# Patient Record
Sex: MALE | Race: WHITE | NOT HISPANIC OR LATINO | Employment: OTHER | URBAN - METROPOLITAN AREA
[De-identification: names, ages, dates, MRNs, and addresses within clinical notes are randomized per-mention and may not be internally consistent; named-entity substitution may affect disease eponyms.]

---

## 2017-03-09 ENCOUNTER — OFFICE VISIT (OUTPATIENT)
Dept: INTERNAL MEDICINE | Facility: CLINIC | Age: 67
End: 2017-03-09

## 2017-03-09 VITALS
DIASTOLIC BLOOD PRESSURE: 78 MMHG | BODY MASS INDEX: 29.8 KG/M2 | HEIGHT: 72 IN | WEIGHT: 220 LBS | SYSTOLIC BLOOD PRESSURE: 128 MMHG

## 2017-03-09 DIAGNOSIS — I25.10 CORONARY ARTERIOSCLEROSIS IN NATIVE ARTERY: ICD-10-CM

## 2017-03-09 DIAGNOSIS — E78.00 PURE HYPERCHOLESTEROLEMIA: ICD-10-CM

## 2017-03-09 DIAGNOSIS — Z12.5 PROSTATE CANCER SCREENING: ICD-10-CM

## 2017-03-09 DIAGNOSIS — I10 ESSENTIAL HYPERTENSION, BENIGN: ICD-10-CM

## 2017-03-09 DIAGNOSIS — F39 EPISODIC MOOD DISORDER (HCC): ICD-10-CM

## 2017-03-09 DIAGNOSIS — K21.00 GASTROESOPHAGEAL REFLUX DISEASE WITH ESOPHAGITIS: ICD-10-CM

## 2017-03-09 DIAGNOSIS — H81.10 BENIGN POSITIONAL VERTIGO, UNSPECIFIED LATERALITY: ICD-10-CM

## 2017-03-09 DIAGNOSIS — Z00.00 PERIODIC HEALTH ASSESSMENT, GENERAL SCREENING, ADULT: Primary | ICD-10-CM

## 2017-03-09 DIAGNOSIS — H54.40 BLINDNESS, RIGHT EYE, NORMAL VISION LEFT EYE: ICD-10-CM

## 2017-03-09 LAB
ALBUMIN SERPL-MCNC: 3.76 G/DL (ref 3.4–4.6)
ALBUMIN/GLOB SERPL: 1.2 G/DL
ALP SERPL-CCNC: 91 U/L (ref 46–116)
ALT SERPL W P-5'-P-CCNC: 32 U/L (ref 16–63)
ANION GAP SERPL CALCULATED.3IONS-SCNC: 7 MMOL/L
AST SERPL-CCNC: 28 U/L (ref 7–37)
BACTERIA UR QL AUTO: ABNORMAL /HPF
BILIRUB SERPL-MCNC: 0.6 MG/DL (ref 0.2–1)
BILIRUB UR QL STRIP: NEGATIVE
BUN BLD-MCNC: 17 MG/DL (ref 6–22)
BUN/CREAT SERPL: 16.3 (ref 7–25)
CALCIUM SPEC-SCNC: 8.6 MG/DL (ref 8.6–10.5)
CHLORIDE SERPL-SCNC: 105 MMOL/L (ref 95–107)
CHOLEST SERPL-MCNC: 116 MG/DL (ref 0–200)
CLARITY UR: CLEAR
CO2 SERPL-SCNC: 29 MMOL/L (ref 23–32)
COLOR UR: YELLOW
CREAT BLD-MCNC: 1.04 MG/DL (ref 0.7–1.3)
ERYTHROCYTE [DISTWIDTH] IN BLOOD BY AUTOMATED COUNT: 13.8 % (ref 11.5–14.5)
GFR SERPL CREATININE-BSD FRML MDRD: 71 ML/MIN/1.73
GLOBULIN UR ELPH-MCNC: 3 GM/DL
GLUCOSE BLD-MCNC: 97 MG/DL (ref 70–100)
GLUCOSE UR STRIP-MCNC: NEGATIVE MG/DL
HCT VFR BLD AUTO: 42.9 % (ref 40.4–52.2)
HDLC SERPL-MCNC: 34 MG/DL (ref 40–81)
HGB BLD-MCNC: 14.5 G/DL (ref 13.7–17.6)
HGB UR QL STRIP.AUTO: ABNORMAL
HYALINE CASTS UR QL AUTO: ABNORMAL /LPF
KETONES UR QL STRIP: NEGATIVE
LDLC SERPL CALC-MCNC: 65 MG/DL (ref 0–100)
LDLC/HDLC SERPL: 1.9 {RATIO}
LEUKOCYTE ESTERASE UR QL STRIP.AUTO: NEGATIVE
MCH RBC QN AUTO: 29.2 PG (ref 27–32.7)
MCHC RBC AUTO-ENTMCNC: 33.8 G/DL (ref 32.6–36.4)
MCV RBC AUTO: 86.3 FL (ref 79.8–96.2)
NITRITE UR QL STRIP: NEGATIVE
PH UR STRIP.AUTO: 6 [PH] (ref 5–8)
PLATELET # BLD AUTO: 213 10*3/MM3 (ref 140–500)
POTASSIUM BLD-SCNC: 4.6 MMOL/L (ref 3.3–5.3)
PROT SERPL-MCNC: 6.8 G/DL (ref 6.3–8.4)
PROT UR QL STRIP: NEGATIVE
PSA SERPL-MCNC: 0.53 NG/ML (ref 0.13–4)
RBC # BLD AUTO: 4.97 10*6/MM3 (ref 4.6–6)
RBC # UR: ABNORMAL /HPF
REF LAB TEST METHOD: ABNORMAL
SODIUM BLD-SCNC: 141 MMOL/L (ref 136–145)
SP GR UR STRIP: 1.02 (ref 1–1.03)
SQUAMOUS #/AREA URNS HPF: ABNORMAL /HPF
TRIGL SERPL-MCNC: 87 MG/DL (ref 0–150)
UROBILINOGEN UR QL STRIP: ABNORMAL
VLDLC SERPL-MCNC: 17.4 MG/DL
WBC # BLD AUTO: 6.41 10*3/MM3 (ref 4.5–10.7)
WBC UR QL AUTO: ABNORMAL /HPF

## 2017-03-09 PROCEDURE — G0103 PSA SCREENING: HCPCS

## 2017-03-09 PROCEDURE — 80061 LIPID PANEL: CPT

## 2017-03-09 PROCEDURE — 80053 COMPREHEN METABOLIC PANEL: CPT

## 2017-03-09 PROCEDURE — 99397 PER PM REEVAL EST PAT 65+ YR: CPT

## 2017-03-09 PROCEDURE — 81001 URINALYSIS AUTO W/SCOPE: CPT

## 2017-03-09 NOTE — PATIENT INSTRUCTIONS
Medicare Wellness  Personal Prevention Plan of Service     Date of Office Visit:  2017  Encounter Provider:  Catarino Riley MD  Place of Service:  [facility]  Patient Name: Dwayne Keita  :  1950    As part of the Medicare Wellness portion of your visit today, we are providing you with this personalized preventive plan of services (PPPS). This plan is based upon recommendations of the [place] Preventive Services Task Force (USPSTF) and the Advisory Committee on Immunization Practices (ACIP).    This lists the preventive care services that should be considered, and provides dates of when you are due. Items listed as completed are up-to-date and do not require any further intervention.    Health Maintenance   Topic Date Due   • TDAP/TD VACCINES (1 - Tdap) 1967   • PNEUMOCOCCAL VACCINES (65+ LOW/MEDIUM RISK) (1 of 2 - PCV13) 2013   • INFLUENZA VACCINE  2016   • HEPATITIS C SCREENING  10/18/2016   • ZOSTER VACCINE  10/18/2016   • LIPID PANEL  10/19/2016   • MEDICARE ANNUAL WELLNESS  10/18/2016   • AAA SCREEN (ONE-TIME)  10/19/2016   • COLONOSCOPY  2026

## 2017-03-09 NOTE — PROGRESS NOTES
"  General Health History and Physical Exam.    Subjective   History of Present Illness    Dwayne Keita is a 67 y.o. male who presents for an Annual General Health Visit. In addition, we addressed the following health issues:1. Hypertension. 2. GERD. 3. Hypercholesterolemia. 4. Decreased hearing. He declined hearing aides. 5. Coronary heart disease: he denies any chest pain. 6. Episodic vertigo: no recurrence for 6 months to 1 year.      PMH, PSH, SocHx and FamHx: Reviewed and updated in the Visit Navigator.   No Known Allergies  Outpatient Medications Prior to Visit   Medication Sig Dispense Refill   • aspirin 81 MG tablet Take 81 mg by mouth Daily.     • atorvastatin (LIPITOR) 80 MG tablet Take 80 mg by mouth Daily.     • esomeprazole (NexIUM) 40 MG capsule Take 40 mg by mouth Every Morning Before Breakfast.     • isosorbide mononitrate (IMDUR) 30 MG 24 hr tablet Take 30 mg by mouth Daily. Dr Wade     • lisinopril (PRINIVIL,ZESTRIL) 5 MG tablet Take 5 mg by mouth Daily.     • metoprolol succinate XL (TOPROL-XL) 50 MG 24 hr tablet Take 50 mg by mouth Daily. 1/2 TABLET DAILY     • niacin (NIASPAN) 500 MG CR tablet Take 500 mg by mouth Every Night.     • Omega-3 Fatty Acids (FISH OIL PO) Take 1 capsule by mouth Daily.     • sertraline (ZOLOFT) 100 MG tablet TAKE 1/2 TABLET EVERY DAY 45 tablet 3     No facility-administered medications prior to visit.        Patient Active Problem List   Diagnosis   • Benign positional vertigo   • Hypertension   • Hyperlipidemia   • Coronary arteriosclerosis in native artery   • GERD (gastroesophageal reflux disease)   • Benign neoplasm of skin   • Anxiety state   • Episodic mood disorder   • Allergic rhinitis       Health Habits:  Dental Exam.  Last exam 10 years ago: he has false teeth.  Eye Exam. Legally blind right eye: sees eye doctor infrequently.  Exercise: \"just work\". times/week.  Current exercise activities include: walking    Social:   Social History     Social History " "  • Marital status: Single     Spouse name: N/A   • Number of children: N/A   • Years of education: N/A     Social History Main Topics   • Smoking status: Former Smoker     Packs/day: 1.50     Years: 45.00     Types: Cigarettes     Quit date: 2008   • Smokeless tobacco: None   • Alcohol use No   • Drug use: None   • Sexual activity: Not Asked     Other Topics Concern   • None     Social History Narrative     Current Outpatient Prescriptions   Medication Sig Dispense Refill   • aspirin 81 MG tablet Take 81 mg by mouth Daily.     • atorvastatin (LIPITOR) 80 MG tablet Take 80 mg by mouth Daily.     • esomeprazole (NexIUM) 40 MG capsule Take 40 mg by mouth Every Morning Before Breakfast.     • isosorbide mononitrate (IMDUR) 30 MG 24 hr tablet Take 30 mg by mouth Daily. Dr Wade     • lisinopril (PRINIVIL,ZESTRIL) 5 MG tablet Take 5 mg by mouth Daily.     • metoprolol succinate XL (TOPROL-XL) 50 MG 24 hr tablet Take 50 mg by mouth Daily. 1/2 TABLET DAILY     • niacin (NIASPAN) 500 MG CR tablet Take 500 mg by mouth Every Night.     • Omega-3 Fatty Acids (FISH OIL PO) Take 1 capsule by mouth Daily.     • sertraline (ZOLOFT) 100 MG tablet TAKE 1/2 TABLET EVERY DAY 45 tablet 3     No current facility-administered medications for this visit.        Health Risk Assessment:    Current Medical Providers:  Patient Care Team:  Catarino Riley MD as PCP - General  Catarino Riley MD as PCP - Family Medicine  Dr \"Emir\" cardiologist.  Recent Hospitalizations:  No recent hospitalization(s)..    Age-appropriate Screening Schedule:  Refer to the list below for future screening recommendations based on patient's age. Orders for these recommended tests are listed in the plan section. The patient has been provided with a written plan.    Health Maintenance   Topic Date Due   • TDAP/TD VACCINES (1 - Tdap) 01/22/1967   • PNEUMOCOCCAL VACCINES (65+ LOW/MEDIUM RISK) (1 of 2 - PCV13) 01/22/2013   • INFLUENZA VACCINE  08/01/2016   • " "HEPATITIS C SCREENING  10/18/2016   • ZOSTER VACCINE  10/18/2016   • LIPID PANEL  10/19/2016   • MEDICARE ANNUAL WELLNESS  10/18/2016   • AAA SCREEN (ONE-TIME)  10/19/2016   • COLONOSCOPY  03/04/2026       Depression Screen:   No flowsheet data found.    Functional and Cognitive Screening:  No flowsheet data found.    Does the patient have evidence of cognitive impairment? No    Advanced Care Planning:  has an advanced directive - a copy HAS NOT been provided. Have asked the patient to send this to us to add to record    Identification of Risk Factors:  Risk factors include: vision limitations, hearing limitations and polypharmacy.    Immunization History   Administered Date(s) Administered   • Pneumococcal Polysaccharide 01/01/2011       Review of Systems    Constitutional: positive for fatigue  Eyes: positive for blindness right eye.  Ears, nose, mouth, throat, and face: positive for hearing loss and tinnitus  Respiratory: positive for wheezing and  stable LYLE: stopped smoking in 2007.  Cardiovascular: positive for dyspnea  Gastrointestinal: positive for dyspepsia  Genitourinary:negative  Integument/breast: negative  Hematologic/lymphatic: positive for easy bruising  Musculoskeletal:positive for arthralgias, back pain and stiff joints  Neurological: negative  Behavioral/Psych: negative  Endocrine: negative  Allergic/Immunologic: negative        Vitals:    03/09/17 1030   BP: 128/78   BP Location: Left arm   Patient Position: Sitting   Cuff Size: Infant   Weight: 220 lb (99.8 kg)   Height: 72\" (182.9 cm)       Body mass index is 29.84 kg/(m^2).     General appearance: alert, appears stated age and cooperative  Head: Normocephalic, without obvious abnormality, atraumatic  Eyes: positive findings: loss of vision right eye  Ears: normal TM's and external ear canals both ears  Nose: Nares normal. Septum midline. Mucosa normal. No drainage or sinus tenderness.  Throat: lips, mucosa, and tongue normal; teeth and gums " normal and edentulous with false teeth  Neck: no adenopathy, no carotid bruit, no JVD, supple, symmetrical, trachea midline and thyroid not enlarged, symmetric, no tenderness/mass/nodules  Back: symmetric, no curvature. ROM normal. No CVA tenderness.  Lungs: clear to auscultation bilaterally  Chest wall: no tenderness  Heart: regular rate and rhythm, S1, S2 normal, no murmur, click, rub or gallop  Abdomen: soft, non-tender; bowel sounds normal; no masses,  no organomegaly  Male genitalia: normal  Rectal: normal tone, normal prostate, no masses or tenderness  Extremities: extremities normal, atraumatic, no cyanosis or edema  Pulses: 2+ and symmetric  Skin: Skin color, texture, turgor normal. No rashes or lesions  Lymph nodes: Cervical, supraclavicular, and axillary nodes normal.  Neurologic: Grossly normal          Dwayne was seen today for annual exam.    Diagnoses and all orders for this visit:    Periodic health assessment, general screening, adult    Essential hypertension, benign  -     CBC (No Diff); Future  -     Comprehensive Metabolic Panel; Future  -     Urinalysis With / Microscopic If Indicated; Future    Prostate cancer screening  -     PSA Screen ([facility]icare[facility] Only); Future    Pure hypercholesterolemia  -     Lipid Panel; Future    Coronary arteriosclerosis in native artery    Gastroesophageal reflux disease with esophagitis    Benign positional vertigo, unspecified laterality    Episodic mood disorder    Blindness, right eye, normal vision left eye          Patient Self-Management and Personalized Health Advice  The patient has been provided with information about: exercise and get Zostavax and preventive services including:   · Exercise counseling provided, Influenza vaccine, Zostavax vaccine (Herpes Zoster).    Discussed the patient's BMI with him. The BMI is in the acceptable range.    Orders:  No orders of the defined types were placed in this encounter.      Follow Up:  No Follow-up on file.      An After Visit Summary and PPPS with all of these plans were given to the patient.

## 2017-03-29 ENCOUNTER — TELEPHONE (OUTPATIENT)
Dept: INTERNAL MEDICINE | Facility: CLINIC | Age: 67
End: 2017-03-29

## 2017-03-29 DIAGNOSIS — I25.10 CORONARY ARTERY DISEASE INVOLVING NATIVE CORONARY ARTERY OF NATIVE HEART WITHOUT ANGINA PECTORIS: Primary | ICD-10-CM

## 2017-03-29 NOTE — TELEPHONE ENCOUNTER
----- Message from Carloz Dawson sent at 3/29/2017  9:34 AM EDT -----  Contact: Renato from [facility].   Renato calling, needs a referral for pt's appointment tomorrow. Pt has [employer] hmo. Please advise.     Pt is seeing JOVAN Pedroza    Diagnosis code 272.4   CPT code 83189  Tax ID #[id number]  Group NPI#[id number]    Fax#[phone]  Ph#[phone]

## 2017-08-25 ENCOUNTER — OFFICE VISIT (OUTPATIENT)
Dept: INTERNAL MEDICINE | Facility: CLINIC | Age: 67
End: 2017-08-25

## 2017-08-25 VITALS
DIASTOLIC BLOOD PRESSURE: 80 MMHG | TEMPERATURE: 98.3 F | BODY MASS INDEX: 29.43 KG/M2 | OXYGEN SATURATION: 96 % | WEIGHT: 217 LBS | HEART RATE: 69 BPM | SYSTOLIC BLOOD PRESSURE: 130 MMHG

## 2017-08-25 DIAGNOSIS — H54.7 LOSS OF VISION: Primary | ICD-10-CM

## 2017-08-25 PROCEDURE — 99213 OFFICE O/P EST LOW 20 MIN: CPT | Performed by: NURSE PRACTITIONER

## 2017-08-25 NOTE — PROGRESS NOTES
"Subjective   Dwayne Keita is a 67 y.o. male.     HPI Comments: His last eye exam 12/2016, which is when he started having visual difficulty. He has had previous cataract surgeries. He had carotid performed 12/2016 with cardiologist. He reports in the last 24 hours his vision in left eye has gotten progressively worst. He is unable to drive.    Eye Problem    The left eye is affected. This is a new problem. The current episode started 1 to 4 weeks ago. There was no injury mechanism. The pain is at a severity of 0/10. The patient is experiencing no pain. There is no known exposure to pink eye. He does not wear contacts. Associated symptoms include blurred vision (\"looks like crescent moon \"), itching and photophobia. Pertinent negatives include no eye discharge, double vision, eye redness, fever, nausea, recent URI or weakness. He has tried eye drops for the symptoms. The treatment provided no relief.        The following portions of the patient's history were reviewed and updated as appropriate: allergies, current medications and problem list.    Review of Systems   Constitutional: Negative for activity change, appetite change, fatigue and fever.   HENT: Positive for sinus pressure (intermittent, chronic. ).    Eyes: Positive for blurred vision (\"looks like crescent moon \"), photophobia, itching and visual disturbance (loss of vision in left eye; blurred ). Negative for double vision, pain, discharge and redness.   Respiratory: Negative for cough, shortness of breath and wheezing.    Cardiovascular: Negative for chest pain, palpitations and leg swelling.   Gastrointestinal: Negative for nausea.   Endocrine: Negative for polydipsia, polyphagia and polyuria.   Genitourinary:        Nocturia, normal   Skin: Positive for itching.   Allergic/Immunologic: Positive for environmental allergies.   Neurological: Positive for headaches (mild with sinus ). Negative for dizziness, tremors, speech difficulty, weakness, " light-headedness and numbness.       Objective   Physical Exam   Constitutional: He is oriented to person, place, and time. He appears well-developed and well-nourished.   HENT:   Head: Normocephalic.   Nose: Nose normal.   Eyes: Conjunctivae, EOM and lids are normal. Pupils are equal, round, and reactive to light.       Patient has complete loss of vision in left medial upper hemisphere of eye   Neck: Carotid bruit is not present. No thyroid mass and no thyromegaly present.   Cardiovascular: Regular rhythm and normal heart sounds.  Exam reveals no S3 and no S4.    No murmur heard.  Pulmonary/Chest: Effort normal and breath sounds normal. He has no decreased breath sounds. He has no wheezes. He has no rhonchi. He has no rales.   Musculoskeletal: He exhibits no edema.   Neurological: He is alert and oriented to person, place, and time. No cranial nerve deficit. Gait normal.   Skin: Skin is warm and dry.   Psychiatric: He has a normal mood and affect.       Assessment/Plan   Dwayne was seen today for blurred vision.    Diagnoses and all orders for this visit:    Loss of vision  Comments:  loss of vision to right upper medial hemisphere; he will go to [facility] [place]  Orders:  -     Ambulatory Referral to Ophthalmology        His neuro exam is completely normal. He has been instructed to go to ophthalmologist ASAP.

## 2017-08-29 ENCOUNTER — HOSPITAL ENCOUNTER (OUTPATIENT)
Dept: PREOP | Facility: HOSPITAL | Age: 67
Setting detail: HOSPITAL OUTPATIENT SURGERY
Discharge: HOME | End: 2017-08-29
Attending: OPHTHALMOLOGY | Admitting: OPHTHALMOLOGY

## 2017-08-30 ENCOUNTER — TELEPHONE (OUTPATIENT)
Dept: INTERNAL MEDICINE | Facility: CLINIC | Age: 67
End: 2017-08-30

## 2017-08-30 NOTE — TELEPHONE ENCOUNTER
I called patient to check up from his office visit. He was not available but his spouse informed me he had surgery yesterday for left retinal detachment. He follows up in one week. He is doing well.

## 2018-01-15 RX ORDER — SERTRALINE HYDROCHLORIDE 100 MG/1
TABLET, FILM COATED ORAL
Qty: 45 TABLET | Refills: 3 | Status: SHIPPED | OUTPATIENT
Start: 2018-01-15 | End: 2019-01-11 | Stop reason: SDUPTHER

## 2018-02-18 ENCOUNTER — TELEPHONE (OUTPATIENT)
Dept: INTERNAL MEDICINE | Facility: CLINIC | Age: 68
End: 2018-02-18

## 2018-02-18 NOTE — TELEPHONE ENCOUNTER
Dwaynemedina Keita's wife called complaining of episode of confusion while driving on [place] last night from the grocery store. All of a sudden he didn't know where to turn on a usual, well familiar route. Symptoms lasted few seconds and resolved spontaneously.    Plan: schedule f/u OV

## 2018-02-19 ENCOUNTER — OFFICE VISIT (OUTPATIENT)
Dept: INTERNAL MEDICINE | Facility: CLINIC | Age: 68
End: 2018-02-19

## 2018-02-19 VITALS
DIASTOLIC BLOOD PRESSURE: 80 MMHG | BODY MASS INDEX: 30.66 KG/M2 | HEART RATE: 53 BPM | WEIGHT: 219 LBS | HEIGHT: 71 IN | SYSTOLIC BLOOD PRESSURE: 124 MMHG | OXYGEN SATURATION: 95 %

## 2018-02-19 DIAGNOSIS — I25.10 CORONARY ARTERIOSCLEROSIS IN NATIVE ARTERY: ICD-10-CM

## 2018-02-19 DIAGNOSIS — R42 LIGHTHEADEDNESS: ICD-10-CM

## 2018-02-19 DIAGNOSIS — R41.0 CONFUSION: Primary | ICD-10-CM

## 2018-02-19 DIAGNOSIS — I10 ESSENTIAL HYPERTENSION, BENIGN: ICD-10-CM

## 2018-02-19 DIAGNOSIS — R51.9 ACUTE NONINTRACTABLE HEADACHE, UNSPECIFIED HEADACHE TYPE: ICD-10-CM

## 2018-02-19 LAB
ALBUMIN SERPL-MCNC: 4.5 G/DL (ref 3.5–5.2)
ALBUMIN/GLOB SERPL: 2 G/DL
ALP SERPL-CCNC: 85 U/L (ref 39–117)
ALT SERPL-CCNC: 23 U/L (ref 1–41)
AST SERPL-CCNC: 22 U/L (ref 1–40)
BASOPHILS # BLD AUTO: 0.05 10*3/MM3 (ref 0–0.2)
BASOPHILS NFR BLD AUTO: 0.7 % (ref 0–1.5)
BILIRUB SERPL-MCNC: 0.5 MG/DL (ref 0.1–1.2)
BUN SERPL-MCNC: 12 MG/DL (ref 8–23)
BUN/CREAT SERPL: 11.3 (ref 7–25)
CALCIUM SERPL-MCNC: 9.4 MG/DL (ref 8.6–10.5)
CHLORIDE SERPL-SCNC: 103 MMOL/L (ref 98–107)
CO2 SERPL-SCNC: 25.6 MMOL/L (ref 22–29)
CREAT SERPL-MCNC: 1.06 MG/DL (ref 0.76–1.27)
EOSINOPHIL # BLD AUTO: 0.24 10*3/MM3 (ref 0–0.7)
EOSINOPHIL # BLD AUTO: 3.6 % (ref 0.3–6.2)
ERYTHROCYTE [DISTWIDTH] IN BLOOD BY AUTOMATED COUNT: 14.6 % (ref 11.5–14.5)
ERYTHROCYTE [SEDIMENTATION RATE] IN BLOOD BY WESTERGREN METHOD: 8 MM/HR (ref 0–20)
GLOBULIN SER CALC-MCNC: 2.2 GM/DL
GLUCOSE SERPL-MCNC: 122 MG/DL (ref 65–99)
HCT VFR BLD AUTO: 45.8 % (ref 40.4–52.2)
HGB BLD-MCNC: 14.7 G/DL (ref 13.7–17.6)
IMM GRANULOCYTES # BLD: 0.04 10*3/MM3 (ref 0–0.03)
IMM GRANULOCYTES NFR BLD: 0.6 % (ref 0–0.5)
LYMPHOCYTES # BLD AUTO: 1.43 10*3/MM3 (ref 0.9–4.8)
LYMPHOCYTES NFR BLD AUTO: 21.4 % (ref 19.6–45.3)
MCH RBC QN AUTO: 29.2 PG (ref 27–32.7)
MCHC RBC AUTO-ENTMCNC: 32.1 G/DL (ref 32.6–36.4)
MCV RBC AUTO: 90.9 FL (ref 79.8–96.2)
MONOCYTES # BLD AUTO: 0.38 10*3/MM3 (ref 0.2–1.2)
MONOCYTES NFR BLD AUTO: 5.7 % (ref 5–12)
NEUTROPHILS # BLD AUTO: 4.53 10*3/MM3 (ref 1.9–8.1)
NEUTROPHILS NFR BLD AUTO: 68 % (ref 42.7–76)
PLATELET # BLD AUTO: 228 10*3/MM3 (ref 140–500)
POTASSIUM SERPL-SCNC: 4.4 MMOL/L (ref 3.5–5.2)
PROT SERPL-MCNC: 6.7 G/DL (ref 6–8.5)
RBC # BLD AUTO: 5.04 10*6/MM3 (ref 4.6–6)
SODIUM SERPL-SCNC: 141 MMOL/L (ref 136–145)
TSH SERPL-ACNC: 1.74 MIU/ML (ref 0.27–4.2)
WBC # BLD AUTO: 6.67 10*3/MM3 (ref 4.5–10.7)

## 2018-02-19 PROCEDURE — 99214 OFFICE O/P EST MOD 30 MIN: CPT | Performed by: NURSE PRACTITIONER

## 2018-02-19 RX ORDER — ACETAMINOPHEN,DIPHENHYDRAMINE HCL 500; 25 MG/1; MG/1
1 TABLET, FILM COATED ORAL NIGHTLY PRN
COMMUNITY

## 2018-02-20 NOTE — PROGRESS NOTES
Subjective   Dwayne Keita is a 68 y.o. male who presents due to an episode of confusion and memory loss.    HPI Comments: He was driving Saturday evening when he experienced an acute episode of confusion (witnessed by his wife who is present today); he was unsure where he was (driving home) and was unable to figure out where to go. He pulled over and symptoms resolved within 30 seconds to 1 minute. His wife denies slurred or garbled speech. He denies blurred vision, no syncope. He has a history of vertigo which has been somewhat worse over the past week. He c/o a mild headache since incident occurred but denies any additional confusion. No recent head injury or trauma.    Memory Loss   This is a new problem. The current episode started in the past 7 days. The problem has been resolved. Associated symptoms include congestion, fatigue and headaches. Pertinent negatives include no abdominal pain, arthralgias, change in bowel habit, chest pain, chills, coughing, fever, joint swelling, myalgias, nausea, numbness, sore throat, vertigo, visual change, vomiting or weakness. The symptoms are aggravated by stress (?possible stress, was thinking about work prior to episode).        The following portions of the patient's history were reviewed and updated as appropriate: allergies, current medications, past social history and problem list.    Past Medical History:   Diagnosis Date   • Allergic rhinitis    • Anxiety state    • Benign neoplasm of skin    • Benign positional vertigo    • Coronary artery disease    • Episodic mood disorder    • GERD (gastroesophageal reflux disease)    • Hyperlipidemia    • Hypertension          Current Outpatient Prescriptions:   •  aspirin 81 MG tablet, Take 81 mg by mouth Daily., Disp: , Rfl:   •  atorvastatin (LIPITOR) 80 MG tablet, Take 80 mg by mouth Daily., Disp: , Rfl:   •  diphenhydrAMINE-acetaminophen (TYLENOL PM)  MG tablet per tablet, Take 1 tablet by mouth At Night As Needed  "for Sleep., Disp: , Rfl:   •  esomeprazole (NexIUM) 40 MG capsule, Take 40 mg by mouth Every Morning Before Breakfast., Disp: , Rfl:   •  isosorbide mononitrate (IMDUR) 30 MG 24 hr tablet, Take 30 mg by mouth Daily. Dr Wade, Disp: , Rfl:   •  lisinopril (PRINIVIL,ZESTRIL) 5 MG tablet, Take 5 mg by mouth Daily., Disp: , Rfl:   •  metoprolol succinate XL (TOPROL-XL) 50 MG 24 hr tablet, Take 50 mg by mouth Daily. 1/2 TABLET DAILY, Disp: , Rfl:   •  niacin (NIASPAN) 500 MG CR tablet, Take 500 mg by mouth Every Night., Disp: , Rfl:   •  Omega-3 Fatty Acids (FISH OIL PO), Take 1 capsule by mouth Daily., Disp: , Rfl:   •  sertraline (ZOLOFT) 100 MG tablet, TAKE 1/2 TABLET EVERY DAY, Disp: 45 tablet, Rfl: 3    No Known Allergies    Review of Systems   Constitutional: Positive for fatigue. Negative for chills, fever and unexpected weight change.   HENT: Positive for congestion, postnasal drip and sinus pressure. Negative for ear pain, sore throat and trouble swallowing.    Eyes: Negative for visual disturbance.   Respiratory: Negative for cough, chest tightness and wheezing.    Cardiovascular: Negative for chest pain, palpitations and leg swelling.   Gastrointestinal: Negative for abdominal pain, blood in stool, change in bowel habit, nausea and vomiting.   Genitourinary: Negative for dysuria, frequency and urgency.   Musculoskeletal: Negative for arthralgias, joint swelling and myalgias.   Skin: Negative for color change.   Neurological: Positive for dizziness, light-headedness and headaches. Negative for vertigo, syncope, weakness and numbness.   Hematological: Does not bruise/bleed easily.       Objective   Vitals:    02/19/18 1042   BP: 124/80   BP Location: Left arm   Patient Position: Sitting   Cuff Size: Adult   Pulse: 53   SpO2: 95%   Weight: 99.3 kg (219 lb)   Height: 180.3 cm (71\")     Physical Exam   Constitutional: He appears well-developed and well-nourished. He is cooperative. He does not have a sickly " appearance. He does not appear ill.   HENT:   Head: Normocephalic.   Right Ear: Hearing, tympanic membrane and external ear normal. No drainage, swelling or tenderness. Tympanic membrane is not injected, not scarred, not erythematous and not bulging. No middle ear effusion. No decreased hearing is noted.   Left Ear: Hearing, tympanic membrane and external ear normal. No drainage, swelling or tenderness. Tympanic membrane is not injected, not scarred, not erythematous and not bulging.  No middle ear effusion. No decreased hearing is noted.   Nose: Nose normal. No mucosal edema, rhinorrhea, sinus tenderness or nasal deformity. Right sinus exhibits no maxillary sinus tenderness and no frontal sinus tenderness. Left sinus exhibits no maxillary sinus tenderness and no frontal sinus tenderness.   Mouth/Throat: Oropharynx is clear and moist and mucous membranes are normal. Normal dentition.   Eyes: Conjunctivae and lids are normal. Pupils are equal, round, and reactive to light. Right eye exhibits no discharge and no exudate. Left eye exhibits no discharge and no exudate.   Neck: Trachea normal and normal range of motion. Carotid bruit is not present. No edema present. No thyroid mass and no thyromegaly present.   Cardiovascular: Regular rhythm, normal heart sounds and normal pulses.    No murmur heard.  Pulmonary/Chest: Breath sounds normal. No respiratory distress. He has no decreased breath sounds. He has no wheezes. He has no rhonchi. He has no rales.   Lymphadenopathy:        Head (right side): No submental, no submandibular, no tonsillar, no preauricular, no posterior auricular and no occipital adenopathy present.        Head (left side): No submental, no submandibular, no tonsillar, no preauricular, no posterior auricular and no occipital adenopathy present.   Neurological: He is alert. He has normal strength. No sensory deficit.   Skin: Skin is warm, dry and intact. No cyanosis. Nails show no clubbing.        Assessment/Plan   Dwayne was seen today for memory loss.    Diagnoses and all orders for this visit:    Confusion  Comments:  sx due to TIA? will send for carotid doppler (last 2016) and MRI to further evaluate    Acute nonintractable headache, unspecified headache type  -     MRI Brain With & Without Contrast; Future  -     Duplex Carotid Ultrasound CAR; Future  -     CBC Auto Differential; Future  -     Comprehensive Metabolic Panel; Future  -     Sedimentation Rate; Future  -     CBC Auto Differential  -     Comprehensive Metabolic Panel  -     Sedimentation Rate    Lightheadedness  -     Duplex Carotid Ultrasound CAR; Future    Coronary arteriosclerosis in native artery  Comments:  followed by cardiology  Orders:  -     Duplex Carotid Ultrasound CAR; Future    Essential hypertension, benign  Comments:  stable on current meds  Orders:  -     TSH; Future  -     TSH

## 2018-02-27 DIAGNOSIS — R47.81 SLURRED SPEECH: Primary | ICD-10-CM

## 2018-02-28 ENCOUNTER — TELEPHONE (OUTPATIENT)
Dept: INTERNAL MEDICINE | Facility: CLINIC | Age: 68
End: 2018-02-28

## 2018-02-28 ENCOUNTER — HOSPITAL ENCOUNTER (OUTPATIENT)
Dept: CARDIOLOGY | Facility: HOSPITAL | Age: 68
Discharge: HOME | End: 2018-02-28

## 2018-02-28 ENCOUNTER — HOSPITAL ENCOUNTER (OUTPATIENT)
Dept: MRI IMAGING | Facility: HOSPITAL | Age: 68
Discharge: HOME | End: 2018-02-28
Admitting: NURSE PRACTITIONER

## 2018-02-28 DIAGNOSIS — R51.9 ACUTE NONINTRACTABLE HEADACHE, UNSPECIFIED HEADACHE TYPE: ICD-10-CM

## 2018-02-28 DIAGNOSIS — R47.81 SLURRED SPEECH: ICD-10-CM

## 2018-02-28 LAB
BH CV XLRA MEAS LEFT CCA RATIO VEL: 78 CM/SEC
BH CV XLRA MEAS LEFT DIST CCA EDV: 19.9 CM/SEC
BH CV XLRA MEAS LEFT DIST CCA PSV: 78 CM/SEC
BH CV XLRA MEAS LEFT DIST ICA EDV: -33 CM/SEC
BH CV XLRA MEAS LEFT DIST ICA PSV: -88 CM/SEC
BH CV XLRA MEAS LEFT ICA RATIO VEL: -95.1 CM/SEC
BH CV XLRA MEAS LEFT ICA/CCA RATIO: -1.2
BH CV XLRA MEAS LEFT MID ICA EDV: -24.4 CM/SEC
BH CV XLRA MEAS LEFT MID ICA PSV: -95.1 CM/SEC
BH CV XLRA MEAS LEFT PROX CCA EDV: 22.3 CM/SEC
BH CV XLRA MEAS LEFT PROX CCA PSV: 104 CM/SEC
BH CV XLRA MEAS LEFT PROX ECA EDV: -27.5 CM/SEC
BH CV XLRA MEAS LEFT PROX ECA PSV: -150 CM/SEC
BH CV XLRA MEAS LEFT PROX ICA EDV: -22.3 CM/SEC
BH CV XLRA MEAS LEFT PROX ICA PSV: -70.4 CM/SEC
BH CV XLRA MEAS LEFT PROX SCLA PSV: -122 CM/SEC
BH CV XLRA MEAS LEFT VERTEBRAL A EDV: 13.4 CM/SEC
BH CV XLRA MEAS LEFT VERTEBRAL A PSV: 47.5 CM/SEC
BH CV XLRA MEAS RIGHT CCA RATIO VEL: 78 CM/SEC
BH CV XLRA MEAS RIGHT DIST CCA EDV: 18.8 CM/SEC
BH CV XLRA MEAS RIGHT DIST CCA PSV: 78 CM/SEC
BH CV XLRA MEAS RIGHT DIST ICA EDV: -25.8 CM/SEC
BH CV XLRA MEAS RIGHT DIST ICA PSV: -58.6 CM/SEC
BH CV XLRA MEAS RIGHT ICA RATIO VEL: -103 CM/SEC
BH CV XLRA MEAS RIGHT ICA/CCA RATIO: -1.3
BH CV XLRA MEAS RIGHT MID ICA EDV: -25.8 CM/SEC
BH CV XLRA MEAS RIGHT MID ICA PSV: -82.7 CM/SEC
BH CV XLRA MEAS RIGHT PROX CCA EDV: 21.7 CM/SEC
BH CV XLRA MEAS RIGHT PROX CCA PSV: 79.7 CM/SEC
BH CV XLRA MEAS RIGHT PROX ECA EDV: -14.9 CM/SEC
BH CV XLRA MEAS RIGHT PROX ECA PSV: -105 CM/SEC
BH CV XLRA MEAS RIGHT PROX ICA EDV: -25.9 CM/SEC
BH CV XLRA MEAS RIGHT PROX ICA PSV: -103 CM/SEC
BH CV XLRA MEAS RIGHT PROX SCLA EDV: -6.9 CM/SEC
BH CV XLRA MEAS RIGHT PROX SCLA PSV: -139 CM/SEC
BH CV XLRA MEAS RIGHT VERTEBRAL A EDV: -18.1 CM/SEC
BH CV XLRA MEAS RIGHT VERTEBRAL A PSV: -47.5 CM/SEC
LEFT ARM BP: NORMAL MMHG
RIGHT ARM BP: NORMAL MMHG

## 2018-02-28 PROCEDURE — 93880 EXTRACRANIAL BILAT STUDY: CPT

## 2018-02-28 NOTE — TELEPHONE ENCOUNTER
Please call in Valium 5mg I tablet 30min-1 hour prior to procedure, #2. Please notify pt regarding medication (may need someone else to drive with the Valium). Please also order a MANUEL. Thanks.              ----- Message -----          From: Lorie Khan          Sent: 2/28/2018   8:56 AM            To: AMBROSIO Wong       Subject: ANXIETY                                                    Patient's wife called in this morning and said her  had severe Anxiety and could not complete the MRI. They would like to go to [facility] instead and radiology recommended that he have a sedative. Do we prescribe that to the patient?

## 2018-03-06 ENCOUNTER — TELEPHONE (OUTPATIENT)
Dept: INTERNAL MEDICINE | Facility: CLINIC | Age: 68
End: 2018-03-06

## 2018-03-06 NOTE — TELEPHONE ENCOUNTER
----- Message from Candace Owusu sent at 3/6/2018  9:22 AM EST -----  Contact: Pt wife inocente   Pt is having an MRI tomorrow wanted to know if it was okay to keep taking-    Diazepam 5 mg  Sertraline 100 mg      Please advise.  Pt #[phone]

## 2018-03-06 NOTE — TELEPHONE ENCOUNTER
Discussed with wife-okay to take Diazepam prior to MRI but continue Sertraline. Advised wife Diane that he may be tired with medication and that she should be with him to drive if needed.

## 2018-03-09 ENCOUNTER — OFFICE VISIT (OUTPATIENT)
Dept: INTERNAL MEDICINE | Facility: CLINIC | Age: 68
End: 2018-03-09

## 2018-03-09 VITALS
HEIGHT: 71 IN | BODY MASS INDEX: 31.22 KG/M2 | WEIGHT: 223 LBS | DIASTOLIC BLOOD PRESSURE: 70 MMHG | SYSTOLIC BLOOD PRESSURE: 124 MMHG

## 2018-03-09 DIAGNOSIS — R47.81 SLURRED SPEECH: Primary | ICD-10-CM

## 2018-03-09 DIAGNOSIS — J30.9 CHRONIC ALLERGIC RHINITIS, UNSPECIFIED SEASONALITY, UNSPECIFIED TRIGGER: ICD-10-CM

## 2018-03-09 PROCEDURE — 99213 OFFICE O/P EST LOW 20 MIN: CPT | Performed by: NURSE PRACTITIONER

## 2018-03-09 RX ORDER — LISINOPRIL 10 MG/1
TABLET ORAL
COMMUNITY
Start: 2018-02-19

## 2018-03-09 RX ORDER — DIAZEPAM 5 MG/1
TABLET ORAL
Refills: 0 | COMMUNITY
Start: 2018-02-28 | End: 2018-03-09

## 2018-03-11 NOTE — PROGRESS NOTES
Subjective   Dwayne Keita is a 68 y.o. male who presents due to an episode of confusion and memory loss.    He was driving Saturday evening when he experienced an acute episode of confusion (witnessed by his wife who is present today); he was unsure where he was (driving home) and was unable to figure out where to go. He pulled over and symptoms resolved within 30 seconds to 1 minute. His wife denies slurred or garbled speech. He denies blurred vision, no syncope. He has a history of vertigo which has been somewhat worse over the past week. He c/o a mild headache since incident occurred but denies any additional confusion. No recent head injury or trauma.  His MRI of the brain did not show any acute abnormalities, sinus disease was noticed (does c/o recurrent sinus pressure). His carotid doppler showed mild plaque but no significant stenosis.      Memory Loss   This is a new problem. The current episode started in the past 7 days. The problem has been resolved. Associated symptoms include congestion, fatigue and headaches. Pertinent negatives include no abdominal pain, arthralgias, change in bowel habit, chest pain, chills, coughing, fever, joint swelling, myalgias, nausea, numbness, sore throat, vertigo, visual change, vomiting or weakness. The symptoms are aggravated by stress (?possible stress, was thinking about work prior to episode).        The following portions of the patient's history were reviewed and updated as appropriate: allergies, current medications, past social history and problem list.    Past Medical History:   Diagnosis Date   • Allergic rhinitis    • Anxiety state    • Benign neoplasm of skin    • Benign positional vertigo    • Coronary artery disease    • Episodic mood disorder    • GERD (gastroesophageal reflux disease)    • Hyperlipidemia    • Hypertension          Current Outpatient Prescriptions:   •  aspirin 81 MG tablet, Take 81 mg by mouth Daily., Disp: , Rfl:   •  atorvastatin  (LIPITOR) 80 MG tablet, Take 80 mg by mouth Daily., Disp: , Rfl:   •  diphenhydrAMINE-acetaminophen (TYLENOL PM)  MG tablet per tablet, Take 1 tablet by mouth At Night As Needed for Sleep., Disp: , Rfl:   •  esomeprazole (NexIUM) 40 MG capsule, Take 40 mg by mouth Every Morning Before Breakfast., Disp: , Rfl:   •  isosorbide mononitrate (IMDUR) 30 MG 24 hr tablet, Take 30 mg by mouth Daily. Dr Wade, Disp: , Rfl:   •  lisinopril (PRINIVIL,ZESTRIL) 10 MG tablet, , Disp: , Rfl:   •  metoprolol succinate XL (TOPROL-XL) 50 MG 24 hr tablet, Take 50 mg by mouth Daily. 1/2 TABLET DAILY, Disp: , Rfl:   •  niacin (NIASPAN) 500 MG CR tablet, Take 500 mg by mouth Every Night., Disp: , Rfl:   •  Omega-3 Fatty Acids (FISH OIL PO), Take 1 capsule by mouth Daily., Disp: , Rfl:   •  sertraline (ZOLOFT) 100 MG tablet, TAKE 1/2 TABLET EVERY DAY, Disp: 45 tablet, Rfl: 3    No Known Allergies    Review of Systems   Constitutional: Positive for fatigue. Negative for chills, fever and unexpected weight change.   HENT: Positive for congestion, postnasal drip, sinus pain and sinus pressure. Negative for ear pain, sore throat and trouble swallowing.    Eyes: Negative for visual disturbance.   Respiratory: Negative for cough, chest tightness and wheezing.    Cardiovascular: Negative for chest pain, palpitations and leg swelling.   Gastrointestinal: Negative for abdominal pain, blood in stool, change in bowel habit, nausea and vomiting.   Genitourinary: Negative for dysuria, frequency and urgency.   Musculoskeletal: Negative for arthralgias, joint swelling and myalgias.   Skin: Negative for color change.   Neurological: Positive for dizziness, light-headedness and headaches. Negative for vertigo, syncope, weakness and numbness.   Hematological: Does not bruise/bleed easily.       Objective   Vitals:    03/09/18 1324   BP: 124/70   BP Location: Left arm   Patient Position: Sitting   Cuff Size: Adult   Weight: 101 kg (223 lb)   Height:  "180.3 cm (71\")     Physical Exam   Constitutional: He appears well-developed and well-nourished. He is cooperative. He does not have a sickly appearance. He does not appear ill.   HENT:   Head: Normocephalic.   Right Ear: Hearing, tympanic membrane and external ear normal. No drainage, swelling or tenderness. Tympanic membrane is not injected, not scarred, not erythematous and not bulging. No middle ear effusion. No decreased hearing is noted.   Left Ear: Hearing, tympanic membrane and external ear normal. No drainage, swelling or tenderness. Tympanic membrane is not injected, not scarred, not erythematous and not bulging.  No middle ear effusion. No decreased hearing is noted.   Nose: Nose normal. No mucosal edema, rhinorrhea, sinus tenderness or nasal deformity. Right sinus exhibits no maxillary sinus tenderness and no frontal sinus tenderness. Left sinus exhibits no maxillary sinus tenderness and no frontal sinus tenderness.   Mouth/Throat: Oropharynx is clear and moist and mucous membranes are normal. Normal dentition.   Eyes: Conjunctivae and lids are normal. Pupils are equal, round, and reactive to light. Right eye exhibits no discharge and no exudate. Left eye exhibits no discharge and no exudate.   Neck: Trachea normal and normal range of motion. Carotid bruit is not present. No edema present. No thyroid mass and no thyromegaly present.   Cardiovascular: Regular rhythm, normal heart sounds and normal pulses.    No murmur heard.  Pulmonary/Chest: Breath sounds normal. No respiratory distress. He has no decreased breath sounds. He has no wheezes. He has no rhonchi. He has no rales.   Lymphadenopathy:        Head (right side): No submental, no submandibular, no tonsillar, no preauricular, no posterior auricular and no occipital adenopathy present.        Head (left side): No submental, no submandibular, no tonsillar, no preauricular, no posterior auricular and no occipital adenopathy present.   Neurological: He " is alert. He has normal strength. No sensory deficit.   Skin: Skin is warm, dry and intact. No cyanosis. Nails show no clubbing.       Assessment/Plan   Dwayne was seen today for memory loss and altered mental status.    Diagnoses and all orders for this visit:    Slurred speech  Comments:  resolved, no acute abnl noted on MRI and carotid doppler; continue to monitor for additional episodes    Chronic allergic rhinitis, unspecified seasonality, unspecified trigger  Comments:  discussed management with Mucinex, has declined ENT referral at this point    His episode of slurred speech/confusion lasting several seconds due to increased stress? No acute neurological abnormalities and sx have resolved entirely. Will continue to monitor.

## 2018-04-30 ENCOUNTER — OFFICE VISIT (OUTPATIENT)
Dept: INTERNAL MEDICINE | Facility: CLINIC | Age: 68
End: 2018-04-30

## 2018-04-30 VITALS
HEIGHT: 71 IN | SYSTOLIC BLOOD PRESSURE: 110 MMHG | BODY MASS INDEX: 31.36 KG/M2 | DIASTOLIC BLOOD PRESSURE: 68 MMHG | WEIGHT: 224 LBS

## 2018-04-30 DIAGNOSIS — I25.10 CORONARY ARTERIOSCLEROSIS IN NATIVE ARTERY: Chronic | ICD-10-CM

## 2018-04-30 DIAGNOSIS — F39 EPISODIC MOOD DISORDER (HCC): Chronic | ICD-10-CM

## 2018-04-30 DIAGNOSIS — I10 ESSENTIAL HYPERTENSION: Primary | Chronic | ICD-10-CM

## 2018-04-30 DIAGNOSIS — K21.00 GASTROESOPHAGEAL REFLUX DISEASE WITH ESOPHAGITIS: Chronic | ICD-10-CM

## 2018-04-30 DIAGNOSIS — E78.00 PURE HYPERCHOLESTEROLEMIA: Chronic | ICD-10-CM

## 2018-04-30 PROCEDURE — 99214 OFFICE O/P EST MOD 30 MIN: CPT | Performed by: INTERNAL MEDICINE

## 2018-04-30 RX ORDER — ESOMEPRAZOLE MAGNESIUM 20 MG/1
TABLET ORAL
COMMUNITY
Start: 2018-04-10

## 2018-04-30 NOTE — PROGRESS NOTES
Subjective   Dwayne Keita is a 68 y.o. male.     History of Present Illness     The following portions of the patient's history were reviewed and updated as appropriate: allergies, current medications, past family history, past medical history, past social history, past surgical history and problem list.  67 yo/m with HTN, Hyperlipidemia, CAD, GERD, Episodic Mood Disorder here for follow up. Doing very well overall. He does need labs done today but otherwise is in great shape.  Review of Systems   Constitutional: Negative.    HENT: Negative.    Eyes: Negative.    Respiratory: Negative.    Cardiovascular: Negative.    Gastrointestinal: Negative.    Endocrine: Negative.    Genitourinary: Negative.    Musculoskeletal: Negative.    Skin: Negative.    Allergic/Immunologic: Negative.    Neurological: Negative.    Hematological: Negative.    Psychiatric/Behavioral: Negative.        Objective   Physical Exam   Constitutional: He is oriented to person, place, and time. He appears well-developed and well-nourished.   HENT:   Head: Normocephalic and atraumatic.   Right Ear: External ear normal.   Left Ear: External ear normal.   Eyes: Conjunctivae and EOM are normal. Pupils are equal, round, and reactive to light.   Neck: Normal range of motion. Neck supple.   Cardiovascular: Normal rate, regular rhythm and normal heart sounds.    Pulmonary/Chest: Effort normal and breath sounds normal.   Abdominal: Soft. Bowel sounds are normal.   Musculoskeletal: Normal range of motion.   Neurological: He is alert and oriented to person, place, and time. He has normal reflexes.   Skin: Skin is warm and dry.   Psychiatric: He has a normal mood and affect. His behavior is normal. Judgment and thought content normal.   Nursing note and vitals reviewed.        Assessment/Plan   Dwayne was seen today for hypertension, hyperlipidemia and abdominal pain.    Diagnoses and all orders for this visit:    Essential hypertension  Comments:  well  controlled    Pure hypercholesterolemia  Comments:  check lipids    Coronary arteriosclerosis in native artery  Comments:  stable, no cardiac issues at present    Gastroesophageal reflux disease with esophagitis  Comments:  well controlled at present    Episodic mood disorder  Comments:  on zoloft and doing well

## 2018-06-05 ENCOUNTER — OFFICE VISIT (OUTPATIENT)
Dept: INTERNAL MEDICINE | Facility: CLINIC | Age: 68
End: 2018-06-05

## 2018-06-05 VITALS
BODY MASS INDEX: 30.8 KG/M2 | WEIGHT: 220 LBS | HEIGHT: 71 IN | DIASTOLIC BLOOD PRESSURE: 70 MMHG | HEART RATE: 65 BPM | TEMPERATURE: 98.1 F | SYSTOLIC BLOOD PRESSURE: 112 MMHG | OXYGEN SATURATION: 98 %

## 2018-06-05 DIAGNOSIS — J06.9 ACUTE URI: Primary | ICD-10-CM

## 2018-06-05 PROCEDURE — 99213 OFFICE O/P EST LOW 20 MIN: CPT | Performed by: NURSE PRACTITIONER

## 2018-06-05 RX ORDER — FLUTICASONE PROPIONATE 50 MCG
2 SPRAY, SUSPENSION (ML) NASAL DAILY
Qty: 1 BOTTLE | Refills: 3
Start: 2018-06-05 | End: 2018-07-05

## 2018-06-05 RX ORDER — AZITHROMYCIN 250 MG/1
TABLET, FILM COATED ORAL
Qty: 6 TABLET | Refills: 0 | Status: SHIPPED | OUTPATIENT
Start: 2018-06-05 | End: 2018-11-01

## 2018-06-05 RX ORDER — GUAIFENESIN 600 MG/1
600 TABLET, EXTENDED RELEASE ORAL EVERY 12 HOURS SCHEDULED
Qty: 14 TABLET
Start: 2018-06-05 | End: 2018-06-12

## 2018-06-05 NOTE — PROGRESS NOTES
Brad Keita is a 68 y.o. male who presents due to respiratory symptoms.    URI    This is a new problem. The current episode started in the past 7 days. The problem has been gradually worsening. There has been no fever. Associated symptoms include congestion, coughing (dry and non-productive), headaches, nausea, sinus pain, sneezing and a sore throat. Pertinent negatives include no abdominal pain, chest pain, diarrhea, dysuria, ear pain, rhinorrhea, vomiting or wheezing. Treatments tried: Flonase. The treatment provided mild relief.        The following portions of the patient's history were reviewed and updated as appropriate: allergies, current medications, past social history and problem list.    Past Medical History:   Diagnosis Date   • Allergic rhinitis    • Anxiety state    • Benign neoplasm of skin    • Benign positional vertigo    • Coronary artery disease    • Episodic mood disorder    • GERD (gastroesophageal reflux disease)    • Hyperlipidemia    • Hypertension          Current Outpatient Prescriptions:   •  aspirin 81 MG tablet, Take 81 mg by mouth Daily., Disp: , Rfl:   •  atorvastatin (LIPITOR) 80 MG tablet, Take 80 mg by mouth Daily., Disp: , Rfl:   •  diphenhydrAMINE-acetaminophen (TYLENOL PM)  MG tablet per tablet, Take 1 tablet by mouth At Night As Needed for Sleep., Disp: , Rfl:   •  isosorbide mononitrate (IMDUR) 30 MG 24 hr tablet, Take 30 mg by mouth Daily. Dr Wade, Disp: , Rfl:   •  lisinopril (PRINIVIL,ZESTRIL) 10 MG tablet, , Disp: , Rfl:   •  metoprolol succinate XL (TOPROL-XL) 50 MG 24 hr tablet, Take 50 mg by mouth Daily. 1/2 TABLET DAILY, Disp: , Rfl:   •  NEXIUM 24HR 20 MG tablet delayed-release, , Disp: , Rfl:   •  niacin (NIASPAN) 500 MG CR tablet, Take 500 mg by mouth Every Night., Disp: , Rfl:   •  Omega-3 Fatty Acids (FISH OIL PO), Take 1 capsule by mouth Daily., Disp: , Rfl:   •  sertraline (ZOLOFT) 100 MG tablet, TAKE 1/2 TABLET EVERY DAY, Disp: 45  "tablet, Rfl: 3  •  azithromycin (ZITHROMAX Z-RIZWAN) 250 MG tablet, Take 2 tablets the first day, then 1 tablet daily for 4 days., Disp: 6 tablet, Rfl: 0  •  fluticasone (FLONASE) 50 MCG/ACT nasal spray, 2 sprays into each nostril Daily for 30 days., Disp: 1 bottle, Rfl: 3  •  guaiFENesin (MUCINEX) 600 MG 12 hr tablet, Take 1 tablet by mouth Every 12 (Twelve) Hours for 7 days., Disp: 14 tablet, Rfl:     No Known Allergies    Review of Systems   Constitutional: Positive for fatigue. Negative for activity change, appetite change, chills, fever and unexpected weight change.   HENT: Positive for congestion, postnasal drip, sinus pain, sinus pressure, sneezing and sore throat. Negative for drooling, ear pain, facial swelling, hearing loss, mouth sores, nosebleeds, rhinorrhea, trouble swallowing and voice change.    Eyes: Negative for pain, discharge, itching and visual disturbance.   Respiratory: Positive for cough (dry and non-productive). Negative for choking, chest tightness, shortness of breath and wheezing.    Cardiovascular: Negative for chest pain and palpitations.   Gastrointestinal: Positive for nausea. Negative for abdominal pain, constipation, diarrhea and vomiting.   Endocrine: Negative for polyuria.   Genitourinary: Negative for dysuria and frequency.   Musculoskeletal: Negative for back pain and joint swelling.   Neurological: Positive for headaches.       Objective   Vitals:    06/05/18 0804   BP: 112/70   BP Location: Left arm   Patient Position: Sitting   Cuff Size: Adult   Pulse: 65   Temp: 98.1 °F (36.7 °C)   TempSrc: Oral   SpO2: 98%   Weight: 99.8 kg (220 lb)   Height: 180.3 cm (71\")     Physical Exam   Constitutional: He appears well-developed and well-nourished. He is cooperative. He does not have a sickly appearance. He does not appear ill.   HENT:   Head: Normocephalic.   Right Ear: Hearing, tympanic membrane and external ear normal. No drainage, swelling or tenderness. Tympanic membrane is not " injected, not erythematous and not bulging. No middle ear effusion.   Left Ear: Hearing, tympanic membrane and external ear normal. No drainage, swelling or tenderness. Tympanic membrane is not injected, not erythematous and not bulging.  No middle ear effusion.   Nose: No mucosal edema, rhinorrhea or sinus tenderness. Right sinus exhibits frontal sinus tenderness. Right sinus exhibits no maxillary sinus tenderness. Left sinus exhibits no maxillary sinus tenderness.   Mouth/Throat: Mucous membranes are normal. Posterior oropharyngeal erythema present.   Eyes: Conjunctivae and lids are normal. Right eye exhibits no discharge and no exudate. Left eye exhibits no discharge and no exudate.   Neck: Trachea normal and normal range of motion. Edema present.   Cardiovascular: Regular rhythm, normal heart sounds and normal pulses.    No murmur heard.  Pulmonary/Chest: Breath sounds normal. No respiratory distress. He has no decreased breath sounds. He has no wheezes. He has no rhonchi. He has no rales.   Lymphadenopathy:     He has no cervical adenopathy.   Neurological: He is alert.   Skin: Skin is warm, dry and intact.   Nursing note and vitals reviewed.      Assessment/Plan   Dwayne was seen today for uri.    Diagnoses and all orders for this visit:    Acute URI  -     azithromycin (ZITHROMAX Z-RIZWAN) 250 MG tablet; Take 2 tablets the first day, then 1 tablet daily for 4 days.  -     guaiFENesin (MUCINEX) 600 MG 12 hr tablet; Take 1 tablet by mouth Every 12 (Twelve) Hours for 7 days.  -     fluticasone (FLONASE) 50 MCG/ACT nasal spray; 2 sprays into each nostril Daily for 30 days.    He c/o left arm soreness after lifting a couch last night (+hx CAD) which has resolved today-advised to f/u if sx return for further evaluation.

## 2018-11-01 ENCOUNTER — OFFICE VISIT (OUTPATIENT)
Dept: INTERNAL MEDICINE | Facility: CLINIC | Age: 68
End: 2018-11-01

## 2018-11-01 VITALS
BODY MASS INDEX: 31.5 KG/M2 | DIASTOLIC BLOOD PRESSURE: 68 MMHG | SYSTOLIC BLOOD PRESSURE: 112 MMHG | WEIGHT: 225 LBS | HEIGHT: 71 IN

## 2018-11-01 DIAGNOSIS — K21.00 GASTROESOPHAGEAL REFLUX DISEASE WITH ESOPHAGITIS: ICD-10-CM

## 2018-11-01 DIAGNOSIS — I10 ESSENTIAL HYPERTENSION: Primary | ICD-10-CM

## 2018-11-01 DIAGNOSIS — F41.1 ANXIETY STATE: Chronic | ICD-10-CM

## 2018-11-01 DIAGNOSIS — E78.00 PURE HYPERCHOLESTEROLEMIA: Chronic | ICD-10-CM

## 2018-11-01 DIAGNOSIS — I25.10 CORONARY ARTERIOSCLEROSIS IN NATIVE ARTERY: ICD-10-CM

## 2018-11-01 PROBLEM — R53.83 FATIGUE: Status: ACTIVE | Noted: 2018-10-10

## 2018-11-01 PROBLEM — R06.00 DYSPNEA: Status: ACTIVE | Noted: 2018-10-10

## 2018-11-01 PROBLEM — R07.89 CHEST DISCOMFORT: Status: ACTIVE | Noted: 2018-10-10

## 2018-11-01 LAB
ALBUMIN SERPL-MCNC: 4.1 G/DL (ref 3.5–5.2)
ALBUMIN/GLOB SERPL: 1.7 G/DL
ALP SERPL-CCNC: 84 U/L (ref 39–117)
ALT SERPL-CCNC: 21 U/L (ref 1–41)
AST SERPL-CCNC: 21 U/L (ref 1–40)
BASOPHILS # BLD AUTO: 0.07 10*3/MM3 (ref 0–0.2)
BASOPHILS NFR BLD AUTO: 1.3 % (ref 0–1.5)
BILIRUB SERPL-MCNC: 0.6 MG/DL (ref 0.1–1.2)
BUN SERPL-MCNC: 13 MG/DL (ref 8–23)
BUN/CREAT SERPL: 12.1 (ref 7–25)
CALCIUM SERPL-MCNC: 9.5 MG/DL (ref 8.6–10.5)
CHLORIDE SERPL-SCNC: 105 MMOL/L (ref 98–107)
CHOLEST SERPL-MCNC: 104 MG/DL (ref 0–200)
CO2 SERPL-SCNC: 29.9 MMOL/L (ref 22–29)
CREAT SERPL-MCNC: 1.07 MG/DL (ref 0.76–1.27)
EOSINOPHIL # BLD AUTO: 0.33 10*3/MM3 (ref 0–0.7)
EOSINOPHIL # BLD AUTO: 6.3 % (ref 0.3–6.2)
ERYTHROCYTE [DISTWIDTH] IN BLOOD BY AUTOMATED COUNT: 13.7 % (ref 11.5–14.5)
GLOBULIN SER CALC-MCNC: 2.4 GM/DL
GLUCOSE SERPL-MCNC: 112 MG/DL (ref 65–99)
HCT VFR BLD AUTO: 42 % (ref 40.4–52.2)
HDLC SERPL-MCNC: 31 MG/DL (ref 40–60)
HGB BLD-MCNC: 13.8 G/DL (ref 13.7–17.6)
IMM GRANULOCYTES # BLD: 0.02 10*3/MM3 (ref 0–0.03)
IMM GRANULOCYTES NFR BLD: 0.4 % (ref 0–0.5)
LDLC SERPL CALC-MCNC: 48 MG/DL (ref 0–100)
LYMPHOCYTES # BLD AUTO: 1.29 10*3/MM3 (ref 0.9–4.8)
LYMPHOCYTES NFR BLD AUTO: 24.8 % (ref 19.6–45.3)
MCH RBC QN AUTO: 29.5 PG (ref 27–32.7)
MCHC RBC AUTO-ENTMCNC: 32.9 G/DL (ref 32.6–36.4)
MCV RBC AUTO: 89.7 FL (ref 79.8–96.2)
MONOCYTES # BLD AUTO: 0.45 10*3/MM3 (ref 0.2–1.2)
MONOCYTES NFR BLD AUTO: 8.7 % (ref 5–12)
NEUTROPHILS # BLD AUTO: 3.06 10*3/MM3 (ref 1.9–8.1)
NEUTROPHILS NFR BLD AUTO: 58.9 % (ref 42.7–76)
PLATELET # BLD AUTO: 181 10*3/MM3 (ref 140–500)
POTASSIUM SERPL-SCNC: 4.2 MMOL/L (ref 3.5–5.2)
PROT SERPL-MCNC: 6.5 G/DL (ref 6–8.5)
RBC # BLD AUTO: 4.68 10*6/MM3 (ref 4.6–6)
SODIUM SERPL-SCNC: 144 MMOL/L (ref 136–145)
TRIGL SERPL-MCNC: 125 MG/DL (ref 0–150)
VLDLC SERPL-MCNC: 25 MG/DL (ref 5–40)
WBC # BLD AUTO: 5.2 10*3/MM3 (ref 4.5–10.7)

## 2018-11-01 PROCEDURE — 99214 OFFICE O/P EST MOD 30 MIN: CPT | Performed by: INTERNAL MEDICINE

## 2018-11-01 RX ORDER — INFLUENZA A VIRUS A/MICHIGAN/45/2015 X-275 (H1N1) ANTIGEN (FORMALDEHYDE INACTIVATED), INFLUENZA A VIRUS A/SINGAPORE/INFIMH-16-0019/2016 IVR-186 (H3N2) ANTIGEN (FORMALDEHYDE INACTIVATED), AND INFLUENZA B VIRUS B/MARYLAND/15/2016 BX-69A (A B/COLORADO/6/2017-LIKE VIRUS) ANTIGEN (FORMALDEHYDE INACTIVATED) 60; 60; 60 UG/.5ML; UG/.5ML; UG/.5ML
INJECTION, SUSPENSION INTRAMUSCULAR
Refills: 0 | COMMUNITY
Start: 2018-10-09 | End: 2019-02-04

## 2018-11-01 NOTE — PROGRESS NOTES
Subjective   Dwayne Keita is a 68 y.o. male. Presents with a chief complaint of HTN, Hyperlipidemia, CAD, anxiety disorder, GERD here for follow up and lab check.    History of Present Illness     The following portions of the patient's history were reviewed and updated as appropriate: allergies, current medications, past family history, past medical history, past social history, past surgical history and problem list.    Review of Systems   Constitutional: Negative.    HENT: Negative.    Eyes: Negative.    Respiratory: Negative.    Cardiovascular: Negative.    Gastrointestinal: Negative.    Endocrine: Negative.    Genitourinary: Negative.    Musculoskeletal: Negative.    Skin: Negative.    Allergic/Immunologic: Negative.    Neurological: Negative.    Hematological: Negative.    Psychiatric/Behavioral: Negative.        Objective   Physical Exam   Constitutional: He is oriented to person, place, and time. He appears well-developed and well-nourished.   HENT:   Head: Normocephalic and atraumatic.   Eyes: Pupils are equal, round, and reactive to light. EOM are normal.   Neck: Normal range of motion. Neck supple.   Cardiovascular: Normal rate, regular rhythm and normal heart sounds.    Pulmonary/Chest: Effort normal and breath sounds normal.   Abdominal: Soft. Bowel sounds are normal.   Musculoskeletal: Normal range of motion.   Neurological: He is alert and oriented to person, place, and time.   Skin: Skin is warm and dry.   Psychiatric: He has a normal mood and affect. His behavior is normal. Judgment and thought content normal.   Nursing note and vitals reviewed.        Assessment/Plan   Dwayne was seen today for hypertension and hyperlipidemia.    Diagnoses and all orders for this visit:    Essential hypertension  Comments:  well controlled  Orders:  -     Comprehensive Metabolic Panel; Future  -     CBC & Differential; Future  -     TSH; Future    Pure hypercholesterolemia  Comments:  will check  lipids  Orders:  -     Lipid Panel; Future    Gastroesophageal reflux disease with esophagitis  Comments:  well controlled    Anxiety state  Comments:  currently doing well    Coronary arteriosclerosis in native artery  Comments:  just had a stress test 1 week ago with good results

## 2018-11-02 LAB — TSH SERPL-ACNC: 1.74 MIU/ML (ref 0.27–4.2)

## 2018-12-03 ENCOUNTER — TELEPHONE (OUTPATIENT)
Dept: INTERNAL MEDICINE | Facility: CLINIC | Age: 68
End: 2018-12-03

## 2018-12-03 NOTE — TELEPHONE ENCOUNTER
----- Message from Jael Bonilla sent at 12/3/2018 10:41 AM EST -----  Contact: Patients wife  Patients wife would like a copy of recent labs mailed to them. Please review labs and add note so I can mail it.    Please advise    Patient:[phone]

## 2019-01-11 RX ORDER — SERTRALINE HYDROCHLORIDE 100 MG/1
50 TABLET, FILM COATED ORAL DAILY
Qty: 45 TABLET | Refills: 3 | Status: SHIPPED | OUTPATIENT
Start: 2019-01-11 | End: 2019-10-28 | Stop reason: SDUPTHER

## 2019-01-31 ENCOUNTER — OFFICE VISIT (OUTPATIENT)
Dept: INTERNAL MEDICINE | Facility: CLINIC | Age: 69
End: 2019-01-31

## 2019-01-31 ENCOUNTER — TELEPHONE (OUTPATIENT)
Dept: INTERNAL MEDICINE | Facility: CLINIC | Age: 69
End: 2019-01-31

## 2019-01-31 VITALS
SYSTOLIC BLOOD PRESSURE: 118 MMHG | DIASTOLIC BLOOD PRESSURE: 62 MMHG | BODY MASS INDEX: 31.5 KG/M2 | WEIGHT: 225 LBS | HEIGHT: 71 IN

## 2019-01-31 DIAGNOSIS — I10 ESSENTIAL HYPERTENSION: Primary | Chronic | ICD-10-CM

## 2019-01-31 DIAGNOSIS — E78.00 PURE HYPERCHOLESTEROLEMIA: Chronic | ICD-10-CM

## 2019-01-31 DIAGNOSIS — F39 EPISODIC MOOD DISORDER (HCC): ICD-10-CM

## 2019-01-31 DIAGNOSIS — K21.00 GASTROESOPHAGEAL REFLUX DISEASE WITH ESOPHAGITIS: Chronic | ICD-10-CM

## 2019-01-31 DIAGNOSIS — M54.50 ACUTE BILATERAL LOW BACK PAIN WITHOUT SCIATICA: ICD-10-CM

## 2019-01-31 PROCEDURE — 99214 OFFICE O/P EST MOD 30 MIN: CPT | Performed by: INTERNAL MEDICINE

## 2019-01-31 RX ORDER — OXYCODONE AND ACETAMINOPHEN 10; 325 MG/1; MG/1
1 TABLET ORAL EVERY 6 HOURS PRN
Qty: 30 TABLET | Refills: 0 | Status: SHIPPED | OUTPATIENT
Start: 2019-01-31 | End: 2019-07-17

## 2019-01-31 RX ORDER — DIAZEPAM 5 MG/1
5 TABLET ORAL EVERY 8 HOURS PRN
Qty: 30 TABLET | Refills: 1 | Status: SHIPPED | OUTPATIENT
Start: 2019-01-31 | End: 2019-01-31 | Stop reason: SDUPTHER

## 2019-01-31 RX ORDER — OXYCODONE AND ACETAMINOPHEN 10; 325 MG/1; MG/1
1 TABLET ORAL EVERY 6 HOURS PRN
Qty: 30 TABLET | Refills: 0 | Status: SHIPPED | OUTPATIENT
Start: 2019-01-31 | End: 2019-01-31 | Stop reason: SDUPTHER

## 2019-01-31 RX ORDER — DIAZEPAM 5 MG/1
5 TABLET ORAL EVERY 8 HOURS PRN
Qty: 30 TABLET | Refills: 1 | Status: SHIPPED | OUTPATIENT
Start: 2019-01-31 | End: 2020-02-27

## 2019-01-31 NOTE — TELEPHONE ENCOUNTER
----- Message from Pia Farias sent at 1/31/2019 10:59 AM EST -----  Contact: Patient's wife  Patient's wife called stating his scripts were sent to wrong pharmacy.  Please send to:    [id number] - [place], [place] - [address] AT [facility] - [phone] [facility] [phone]     Patient:  [phone]

## 2019-02-01 ENCOUNTER — TELEPHONE (OUTPATIENT)
Dept: INTERNAL MEDICINE | Facility: CLINIC | Age: 69
End: 2019-02-01

## 2019-02-01 NOTE — TELEPHONE ENCOUNTER
----- Message from Candace Owusu sent at 2/1/2019  9:17 AM EST -----  Contact: Pt wife Pat   Pt is calling to ask about the two most recent prescribed medications.    diazePAM (VALIUM) 5 MG tablet  oxyCODONE-acetaminophen (PERCOCET)  MG per tablet     State it is very strong, just took a half of a pain pill so far.  Wants to make sure its okay to take both.  Or just one or the other first, or alternate.    Pt# [phone]

## 2019-02-04 ENCOUNTER — OFFICE VISIT (OUTPATIENT)
Dept: INTERNAL MEDICINE | Facility: CLINIC | Age: 69
End: 2019-02-04

## 2019-02-04 VITALS
TEMPERATURE: 98 F | HEART RATE: 78 BPM | DIASTOLIC BLOOD PRESSURE: 80 MMHG | WEIGHT: 225 LBS | HEIGHT: 71 IN | OXYGEN SATURATION: 97 % | SYSTOLIC BLOOD PRESSURE: 124 MMHG | BODY MASS INDEX: 31.5 KG/M2

## 2019-02-04 DIAGNOSIS — L03.114 CELLULITIS OF LEFT ARM: Primary | ICD-10-CM

## 2019-02-04 DIAGNOSIS — M54.50 ACUTE BILATERAL LOW BACK PAIN WITHOUT SCIATICA: ICD-10-CM

## 2019-02-04 PROCEDURE — 99214 OFFICE O/P EST MOD 30 MIN: CPT | Performed by: NURSE PRACTITIONER

## 2019-02-04 RX ORDER — DOXYCYCLINE HYCLATE 100 MG/1
100 TABLET, DELAYED RELEASE ORAL 2 TIMES DAILY
COMMUNITY
End: 2019-07-17

## 2019-02-04 RX ORDER — PREDNISONE 20 MG/1
20 TABLET ORAL DAILY
Qty: 5 TABLET | Refills: 0 | Status: SHIPPED | OUTPATIENT
Start: 2019-02-04 | End: 2019-02-09

## 2019-02-05 NOTE — PROGRESS NOTES
Subjective   Dwayne Keita is a 69 y.o. male who presents due to a painful lesion on his left arm. He reports improvement in low back pain.    He reports improvement in low back pain, starting physical therapy on Thursday.      Rash   This is a new problem. The current episode started in the past 7 days. The problem has been gradually worsening since onset. The affected locations include the left shoulder. The rash is characterized by pain and redness. Associated with: possible insect bite? Pertinent negatives include no congestion, cough, fatigue, fever, sore throat or vomiting. Treatments tried: applying heat. The treatment provided mild relief.        The following portions of the patient's history were reviewed and updated as appropriate: allergies, current medications, past social history and problem list.    Past Medical History:   Diagnosis Date   • Allergic rhinitis    • Anxiety state    • Benign neoplasm of skin    • Benign positional vertigo    • Coronary artery disease    • Episodic mood disorder (CMS/HCC)    • GERD (gastroesophageal reflux disease)    • Hyperlipidemia    • Hypertension          Current Outpatient Medications:   •  aspirin 81 MG tablet, Take 81 mg by mouth Daily., Disp: , Rfl:   •  atorvastatin (LIPITOR) 80 MG tablet, Take 80 mg by mouth Daily., Disp: , Rfl:   •  diazePAM (VALIUM) 5 MG tablet, Take 1 tablet by mouth Every 8 (Eight) Hours As Needed for Anxiety., Disp: 30 tablet, Rfl: 1  •  diphenhydrAMINE-acetaminophen (TYLENOL PM)  MG tablet per tablet, Take 1 tablet by mouth At Night As Needed for Sleep., Disp: , Rfl:   •  doxycycline (DORYX) 100 MG enteric coated tablet, Take 100 mg by mouth 2 (Two) Times a Day., Disp: , Rfl:   •  isosorbide mononitrate (IMDUR) 30 MG 24 hr tablet, Take 30 mg by mouth Daily. Dr Wade, Disp: , Rfl:   •  lisinopril (PRINIVIL,ZESTRIL) 10 MG tablet, , Disp: , Rfl:   •  metoprolol succinate XL (TOPROL-XL) 50 MG 24 hr tablet, Take 50 mg by mouth  "Daily. 1/2 TABLET DAILY, Disp: , Rfl:   •  NEXIUM 24HR 20 MG tablet delayed-release, , Disp: , Rfl:   •  niacin (NIASPAN) 500 MG CR tablet, Take 500 mg by mouth Every Night., Disp: , Rfl:   •  Omega-3 Fatty Acids (FISH OIL PO), Take 1 capsule by mouth Daily., Disp: , Rfl:   •  oxyCODONE-acetaminophen (PERCOCET)  MG per tablet, Take 1 tablet by mouth Every 6 (Six) Hours As Needed for Moderate Pain ., Disp: 30 tablet, Rfl: 0  •  sertraline (ZOLOFT) 100 MG tablet, Take 0.5 tablets by mouth Daily., Disp: 45 tablet, Rfl: 3  •  Unable to find, , Disp: , Rfl:   •  predniSONE (DELTASONE) 20 MG tablet, Take 1 tablet by mouth Daily for 5 days., Disp: 5 tablet, Rfl: 0    No Known Allergies    Review of Systems   Constitutional: Negative for chills, fatigue, fever and unexpected weight change.   HENT: Negative for congestion, ear pain, postnasal drip, sinus pressure, sore throat and trouble swallowing.    Eyes: Negative for visual disturbance.   Respiratory: Negative for cough, chest tightness and wheezing.    Cardiovascular: Negative for chest pain, palpitations and leg swelling.   Gastrointestinal: Negative for abdominal pain, blood in stool, nausea and vomiting.   Genitourinary: Negative for dysuria, frequency and urgency.   Musculoskeletal: Positive for arthralgias and back pain. Negative for joint swelling.   Skin: Positive for color change and wound.   Neurological: Negative for syncope, weakness and headaches.   Hematological: Does not bruise/bleed easily.       Objective   Vitals:    02/04/19 1347   BP: 124/80   BP Location: Left arm   Patient Position: Sitting   Cuff Size: Adult   Pulse: 78   Temp: 98 °F (36.7 °C)   TempSrc: Oral   SpO2: 97%   Weight: 102 kg (225 lb)   Height: 180.3 cm (71\")     Physical Exam   Constitutional: He appears well-developed and well-nourished. He is cooperative. He does not have a sickly appearance. He does not appear ill.   HENT:   Head: Normocephalic.   Right Ear: Hearing, tympanic " membrane and external ear normal.   Left Ear: Hearing, tympanic membrane and external ear normal.   Nose: Nose normal. No mucosal edema, rhinorrhea, sinus tenderness or nasal deformity. Right sinus exhibits no maxillary sinus tenderness and no frontal sinus tenderness. Left sinus exhibits no maxillary sinus tenderness and no frontal sinus tenderness.   Mouth/Throat: Oropharynx is clear and moist and mucous membranes are normal. Normal dentition.   Eyes: Conjunctivae and lids are normal. Right eye exhibits no discharge and no exudate. Left eye exhibits no discharge and no exudate.   Neck: Trachea normal. Carotid bruit is not present. No edema present. No thyroid mass present.   Cardiovascular: Regular rhythm, normal heart sounds and normal pulses.   No murmur heard.  Pulmonary/Chest: Breath sounds normal. No respiratory distress. He has no decreased breath sounds. He has no wheezes. He has no rhonchi. He has no rales.   Musculoskeletal:        Left shoulder: He exhibits normal range of motion.        Lumbar back: He exhibits tenderness.   Lymphadenopathy:        Head (right side): No submental, no submandibular, no tonsillar, no preauricular, no posterior auricular and no occipital adenopathy present.        Head (left side): No submental, no submandibular, no tonsillar, no preauricular, no posterior auricular and no occipital adenopathy present.   Neurological: He is alert.   Skin: Skin is warm, dry and intact. No cyanosis. Nails show no clubbing.   There is erythema with mild induration on left shoulder with a central lesion that is dark and black, no active drainage of bleeding from lesion       Assessment/Plan   Dwayne was seen today for rash.    Diagnoses and all orders for this visit:    Cellulitis of left arm  Comments:  sx due to insect sting/bite? will start Doxycycline & apply heat, re-evaluate in 1 week  Orders:  -     predniSONE (DELTASONE) 20 MG tablet; Take 1 tablet by mouth Daily for 5 days.    Acute  bilateral low back pain without sciatica  Comments:  responding to treatment, start PT later this week

## 2019-02-07 ENCOUNTER — APPOINTMENT (OUTPATIENT)
Dept: PHYSICAL THERAPY | Facility: HOSPITAL | Age: 69
End: 2019-02-07

## 2019-07-17 ENCOUNTER — OFFICE VISIT (OUTPATIENT)
Dept: INTERNAL MEDICINE | Facility: CLINIC | Age: 69
End: 2019-07-17

## 2019-07-17 VITALS
WEIGHT: 218 LBS | BODY MASS INDEX: 30.52 KG/M2 | HEIGHT: 71 IN | HEART RATE: 55 BPM | SYSTOLIC BLOOD PRESSURE: 120 MMHG | DIASTOLIC BLOOD PRESSURE: 78 MMHG | OXYGEN SATURATION: 98 %

## 2019-07-17 DIAGNOSIS — E78.00 PURE HYPERCHOLESTEROLEMIA: ICD-10-CM

## 2019-07-17 DIAGNOSIS — Z12.5 SCREENING FOR PROSTATE CANCER: ICD-10-CM

## 2019-07-17 DIAGNOSIS — I10 ESSENTIAL HYPERTENSION: Primary | ICD-10-CM

## 2019-07-17 DIAGNOSIS — Z11.59 SCREENING FOR VIRAL DISEASE: ICD-10-CM

## 2019-07-17 DIAGNOSIS — K21.00 GASTROESOPHAGEAL REFLUX DISEASE WITH ESOPHAGITIS: ICD-10-CM

## 2019-07-17 DIAGNOSIS — Z23 NEED FOR PNEUMOCOCCAL VACCINATION: ICD-10-CM

## 2019-07-17 LAB
ALBUMIN SERPL-MCNC: 4.4 G/DL (ref 3.5–5.2)
ALBUMIN/GLOB SERPL: 2.4 G/DL
ALP SERPL-CCNC: 85 U/L (ref 39–117)
ALT SERPL-CCNC: 21 U/L (ref 1–41)
AST SERPL-CCNC: 23 U/L (ref 1–40)
BASOPHILS # BLD AUTO: 0.08 10*3/MM3 (ref 0–0.2)
BASOPHILS NFR BLD AUTO: 1.6 % (ref 0–1.5)
BILIRUB SERPL-MCNC: 0.4 MG/DL (ref 0.2–1.2)
BUN SERPL-MCNC: 18 MG/DL (ref 8–23)
BUN/CREAT SERPL: 19.8 (ref 7–25)
CALCIUM SERPL-MCNC: 8.7 MG/DL (ref 8.6–10.5)
CHLORIDE SERPL-SCNC: 103 MMOL/L (ref 98–107)
CO2 SERPL-SCNC: 27.1 MMOL/L (ref 22–29)
CREAT SERPL-MCNC: 0.91 MG/DL (ref 0.76–1.27)
EOSINOPHIL # BLD AUTO: 0.33 10*3/MM3 (ref 0–0.4)
EOSINOPHIL # BLD AUTO: 6.6 % (ref 0.3–6.2)
ERYTHROCYTE [DISTWIDTH] IN BLOOD BY AUTOMATED COUNT: 13.3 % (ref 12.3–15.4)
GLOBULIN SER CALC-MCNC: 1.8 GM/DL
GLUCOSE SERPL-MCNC: 113 MG/DL (ref 65–99)
HCT VFR BLD AUTO: 43.3 % (ref 37.5–51)
HGB BLD-MCNC: 14 G/DL (ref 13–17.7)
IMM GRANULOCYTES # BLD: 0.02 10*3/MM3 (ref 0–0.05)
IMM GRANULOCYTES NFR BLD: 0.4 % (ref 0–0.5)
LYMPHOCYTES # BLD AUTO: 1.46 10*3/MM3 (ref 0.7–3.1)
LYMPHOCYTES NFR BLD AUTO: 29.3 % (ref 19.6–45.3)
MCH RBC QN AUTO: 29.2 PG (ref 26.6–33)
MCHC RBC AUTO-ENTMCNC: 32.3 G/DL (ref 31.5–35.7)
MCV RBC AUTO: 90.2 FL (ref 79–97)
MONOCYTES # BLD AUTO: 0.45 10*3/MM3 (ref 0.1–0.9)
MONOCYTES NFR BLD AUTO: 9 % (ref 5–12)
NEUTROPHILS # BLD AUTO: 2.64 10*3/MM3 (ref 1.7–7)
NEUTROPHILS NFR BLD AUTO: 53.1 % (ref 42.7–76)
NRBC BLD AUTO-RTO: 0 /100 WBC (ref 0–0.2)
PLATELET # BLD AUTO: 190 10*3/MM3 (ref 140–450)
POTASSIUM SERPL-SCNC: 4.1 MMOL/L (ref 3.5–5.2)
PROT SERPL-MCNC: 6.2 G/DL (ref 6–8.5)
PSA SERPL-MCNC: 0.56 NG/ML (ref 0–4)
RBC # BLD AUTO: 4.8 10*6/MM3 (ref 4.14–5.8)
SODIUM SERPL-SCNC: 141 MMOL/L (ref 136–145)
WBC # BLD AUTO: 4.98 10*3/MM3 (ref 3.4–10.8)

## 2019-07-17 PROCEDURE — 96160 PT-FOCUSED HLTH RISK ASSMT: CPT | Performed by: NURSE PRACTITIONER

## 2019-07-17 PROCEDURE — 99214 OFFICE O/P EST MOD 30 MIN: CPT | Performed by: NURSE PRACTITIONER

## 2019-07-17 PROCEDURE — G0438 PPPS, INITIAL VISIT: HCPCS | Performed by: NURSE PRACTITIONER

## 2019-07-17 NOTE — PROGRESS NOTES
The ABCs of the Annual Wellness Visit  Initial Medicare Wellness Visit    Chief Complaint   Patient presents with   • Medicare Wellness-Initial Visit       Subjective   History of Present Illness:  Dwayne Keita is a 69 y.o. male who presents for an Initial Medicare Wellness Visit.    HEALTH RISK ASSESSMENT    Recent Hospitalizations:  No hospitalization(s) within the last year.    Current Medical Providers:  Patient Care Team:  Shawn Jones MD as PCP - General (Internal Medicine)    Smoking Status:  Social History     Tobacco Use   Smoking Status Former Smoker   • Packs/day: 1.50   • Years: 45.00   • Pack years: 67.50   • Types: Cigarettes   • Last attempt to quit:    • Years since quittin.5       Alcohol Consumption:  Social History     Substance and Sexual Activity   Alcohol Use No       Depression Screen:   PHQ-2/PHQ-9 Depression Screening 2019   Little interest or pleasure in doing things 0   Feeling down, depressed, or hopeless 0   Trouble falling or staying asleep, or sleeping too much 1   Feeling tired or having little energy 0   Poor appetite or overeating 0   Feeling bad about yourself - or that you are a failure or have let yourself or your family down 0   Trouble concentrating on things, such as reading the newspaper or watching television 0   Moving or speaking so slowly that other people could have noticed. Or the opposite - being so fidgety or restless that you have been moving around a lot more than usual 0   Thoughts that you would be better off dead, or of hurting yourself in some way 0   Total Score 1   If you checked off any problems, how difficult have these problems made it for you to do your work, take care of things at home, or get along with other people? Not difficult at all       Fall Risk Screen:  STEADI Fall Risk Assessment has not been completed.    Health Habits and Functional and Cognitive Screening:  Functional & Cognitive Status 2019   Do you have  difficulty preparing food and eating? No   Do you have difficulty bathing yourself, getting dressed or grooming yourself? No   Do you have difficulty using the toilet? No   Do you have difficulty moving around from place to place? No   Do you have trouble with steps or getting out of a bed or a chair? No   Current Diet Well Balanced Diet   Dental Exam Up to date   Eye Exam Up to date   Exercise (times per week) 5 times per week   Current Exercise Activities Include Walking   Do you need help using the phone?  No   Are you deaf or do you have serious difficulty hearing?  Yes   Do you need help with transportation? No   Do you need help shopping? No   Do you need help preparing meals?  No   Do you need help with housework?  No   Do you need help with laundry? No   Do you need help taking your medications? No   Do you need help managing money? No   Do you ever drive or ride in a car without wearing a seat belt? No   Have you felt unusual stress, anger or loneliness in the last month? No   Who do you live with? Spouse   If you need help, do you have trouble finding someone available to you? No   Have you been bothered in the last four weeks by sexual problems? No   Do you have difficulty concentrating, remembering or making decisions? No         Does the patient have evidence of cognitive impairment? No    Asprin use counseling:Taking ASA appropriately as indicated    Age-appropriate Screening Schedule:  Refer to the list below for future screening recommendations based on patient's age, sex and/or medical conditions. Orders for these recommended tests are listed in the plan section. The patient has been provided with a written plan.    Health Maintenance   Topic Date Due   • ZOSTER VACCINE (1 of 2) 07/25/2020 (Originally 1/22/1998)   • TDAP/TD VACCINES (1 - Tdap) 07/17/2029 (Originally 2/4/2019)   • INFLUENZA VACCINE  08/01/2019   • LIPID PANEL  04/10/2020   • PNEUMOCOCCAL VACCINES (65+ LOW/MEDIUM RISK) (2 of 2 -  PPSV23) 07/17/2020   • COLONOSCOPY  03/04/2026          The following portions of the patient's history were reviewed and updated as appropriate: allergies, current medications, past family history, past medical history, past social history, past surgical history and problem list.    Outpatient Medications Prior to Visit   Medication Sig Dispense Refill   • aspirin 81 MG tablet Take 81 mg by mouth Daily.     • atorvastatin (LIPITOR) 80 MG tablet Take 80 mg by mouth Daily.     • diazePAM (VALIUM) 5 MG tablet Take 1 tablet by mouth Every 8 (Eight) Hours As Needed for Anxiety. 30 tablet 1   • diphenhydrAMINE-acetaminophen (TYLENOL PM)  MG tablet per tablet Take 1 tablet by mouth At Night As Needed for Sleep.     • isosorbide mononitrate (IMDUR) 30 MG 24 hr tablet Take 30 mg by mouth Daily. Dr Wade     • lisinopril (PRINIVIL,ZESTRIL) 10 MG tablet      • metoprolol succinate XL (TOPROL-XL) 50 MG 24 hr tablet Take 50 mg by mouth Daily. 1/2 TABLET DAILY     • NEXIUM 24HR 20 MG tablet delayed-release      • niacin (NIASPAN) 500 MG CR tablet Take 500 mg by mouth Every Night.     • Omega-3 Fatty Acids (FISH OIL PO) Take 1 capsule by mouth Daily.     • sertraline (ZOLOFT) 100 MG tablet Take 0.5 tablets by mouth Daily. 45 tablet 3   • Unable to find      • doxycycline (DORYX) 100 MG enteric coated tablet Take 100 mg by mouth 2 (Two) Times a Day.     • oxyCODONE-acetaminophen (PERCOCET)  MG per tablet Take 1 tablet by mouth Every 6 (Six) Hours As Needed for Moderate Pain . 30 tablet 0     No facility-administered medications prior to visit.        Patient Active Problem List   Diagnosis   • Benign positional vertigo   • Hypertension   • Hyperlipidemia   • Coronary arteriosclerosis in native artery   • GERD (gastroesophageal reflux disease)   • Benign neoplasm of skin   • Anxiety state   • Episodic mood disorder (CMS/HCC)   • Allergic rhinitis   • Chest discomfort   • Dyspnea   • Fatigue   • Acute bilateral low back  "pain without sciatica       Advanced Care Planning:  Patient has an advance directive - a copy has not been provided. Have asked the patient to send this to us to add to record    Review of Systems    Compared to one year ago, the patient feels his physical health is the same.  Compared to one year ago, the patient feels his mental health is worse.  Reports increased stress at work    Reviewed chart for potential of high risk medication in the elderly: yes  Reviewed chart for potential of harmful drug interactions in the elderly:yes    Objective         Vitals:    07/17/19 1306   BP: 120/78   BP Location: Left arm   Patient Position: Sitting   Cuff Size: Adult   Pulse: 55   SpO2: 98%   Weight: 98.9 kg (218 lb)   Height: 180.3 cm (71\")   PainSc:   2   PainLoc: Knee       Body mass index is 30.4 kg/m².  Discussed the patient's BMI with him. The BMI is above average; BMI management plan is completed.    Physical Exam    Lab Results   Component Value Date     (H) 07/17/2019        Assessment/Plan   Medicare Risks and Personalized Health Plan  CMS Preventative Services Quick Reference  Abdominal Aortic Aneurysm Screening  Lung Cancer Risk  Obesity/Overweight   Prostate Cancer Screening     The above risks/problems have been discussed with the patient.  Pertinent information has been shared with the patient in the After Visit Summary.  Follow up plans and orders are seen below in the Assessment/Plan Section.    Diagnoses and all orders for this visit:    1. Essential hypertension (Primary)  -     CBC Auto Differential; Future  -     Comprehensive Metabolic Panel; Future  -     TSH; Future  -     CBC Auto Differential  -     Comprehensive Metabolic Panel  -     TSH    2. Gastroesophageal reflux disease with esophagitis    3. Pure hypercholesterolemia    4. Screening for viral disease  -     Hepatitis C Antibody; Future  -     Hepatitis C Antibody    5. Screening for prostate cancer  -     PSA Screen; Future  -     " PSA Screen    6. Need for pneumococcal vaccination  -     pneumococcal conj. 13-valent (PREVNAR-13) vaccine 0.5 mL      Follow Up:  Return in about 6 months (around 1/17/2020).     An After Visit Summary and PPPS were given to the patient.

## 2019-07-17 NOTE — PATIENT INSTRUCTIONS
Medicare Wellness  Personal Prevention Plan of Service     Date of Office Visit:  2019  Encounter Provider:  AMBROSIO Chance  Place of Service:  [facility]  Patient Name: Dwayne Keita  :  1950    As part of the Medicare Wellness portion of your visit today, we are providing you with this personalized preventive plan of services (PPPS). This plan is based upon recommendations of the [place] Preventive Services Task Force (USPSTF) and the Advisory Committee on Immunization Practices (ACIP).    This lists the preventive care services that should be considered, and provides dates of when you are due. Items listed as completed are up-to-date and do not require any further intervention.    Health Maintenance   Topic Date Due   • LUNG CANCER SCREENING  2003   • MEDICARE ANNUAL WELLNESS  10/18/2016   • AAA SCREEN (ONE-TIME)  10/19/2016   • ZOSTER VACCINE (1 of 2) 2020 (Originally 1998)   • TDAP/TD VACCINES (1 - Tdap) 2029 (Originally 2019)   • INFLUENZA VACCINE  2019   • LIPID PANEL  04/10/2020   • PNEUMOCOCCAL VACCINES (65+ LOW/MEDIUM RISK) (2 of 2 - PPSV23) 2020   • COLONOSCOPY  2026   • HEPATITIS C SCREENING  Completed       Orders Placed This Encounter   Procedures   • Hepatitis C Antibody     Standing Status:   Future     Number of Occurrences:   1     Standing Expiration Date:   2020   • CBC Auto Differential     Standing Status:   Future     Number of Occurrences:   1     Standing Expiration Date:   2020   • Comprehensive Metabolic Panel     Standing Status:   Future     Number of Occurrences:   1     Standing Expiration Date:   2020   • TSH     Standing Status:   Future     Number of Occurrences:   1     Standing Expiration Date:   2020   • PSA Screen     Standing Status:   Future     Number of Occurrences:   1     Standing Expiration Date:   2020     Scheduling Instructions:       Medicare       Return in about 6 months (around 1/17/2020).

## 2019-07-18 LAB
HCV AB S/CO SERPL IA: <0.1 S/CO RATIO (ref 0–0.9)
TSH SERPL-ACNC: 1.1 MIU/ML (ref 0.27–4.2)

## 2019-07-19 NOTE — PROGRESS NOTES
Subjective   Dwayne Keita is a 69 y.o. male who presents for f/u regarding HTN, hyperlipidemia and c/o increased anxiety.    He reports increased stress at work over the past few months, has had difficulty relaxing after a day at work.  He has a history of CAD with bare-metal stent placement 11/19/2007 and coronary artery bypass graft in December 2007.  He denies chest pain or palpitations.  No shortness of breath.          Hypertension   This is a chronic problem. The current episode started more than 1 year ago. The problem is unchanged. The problem is controlled. Pertinent negatives include no chest pain, headaches or palpitations. Current antihypertension treatment includes ACE inhibitors and beta blockers. The current treatment provides significant improvement. There are no compliance problems.         The following portions of the patient's history were reviewed and updated as appropriate: allergies, current medications, past social history and problem list.    Past Medical History:   Diagnosis Date   • Allergic rhinitis    • Anxiety state    • Benign neoplasm of skin    • Benign positional vertigo    • Coronary artery disease    • Episodic mood disorder (CMS/HCC)    • GERD (gastroesophageal reflux disease)    • Hyperlipidemia    • Hypertension          Current Outpatient Medications:   •  aspirin 81 MG tablet, Take 81 mg by mouth Daily., Disp: , Rfl:   •  atorvastatin (LIPITOR) 80 MG tablet, Take 80 mg by mouth Daily., Disp: , Rfl:   •  diazePAM (VALIUM) 5 MG tablet, Take 1 tablet by mouth Every 8 (Eight) Hours As Needed for Anxiety., Disp: 30 tablet, Rfl: 1  •  diphenhydrAMINE-acetaminophen (TYLENOL PM)  MG tablet per tablet, Take 1 tablet by mouth At Night As Needed for Sleep., Disp: , Rfl:   •  isosorbide mononitrate (IMDUR) 30 MG 24 hr tablet, Take 30 mg by mouth Daily. Dr Wade, Disp: , Rfl:   •  lisinopril (PRINIVIL,ZESTRIL) 10 MG tablet, , Disp: , Rfl:   •  metoprolol succinate XL  "(TOPROL-XL) 50 MG 24 hr tablet, Take 50 mg by mouth Daily. 1/2 TABLET DAILY, Disp: , Rfl:   •  NEXIUM 24HR 20 MG tablet delayed-release, , Disp: , Rfl:   •  niacin (NIASPAN) 500 MG CR tablet, Take 500 mg by mouth Every Night., Disp: , Rfl:   •  Omega-3 Fatty Acids (FISH OIL PO), Take 1 capsule by mouth Daily., Disp: , Rfl:   •  sertraline (ZOLOFT) 100 MG tablet, Take 0.5 tablets by mouth Daily., Disp: 45 tablet, Rfl: 3  •  Unable to find, , Disp: , Rfl:     No Known Allergies    Review of Systems   Constitutional: Positive for fatigue. Negative for chills, fever and unexpected weight change.   HENT: Negative for congestion, ear pain, postnasal drip, sinus pressure, sore throat and trouble swallowing.    Eyes: Negative for visual disturbance.   Respiratory: Negative for cough, chest tightness and wheezing.    Cardiovascular: Negative for chest pain, palpitations and leg swelling.   Gastrointestinal: Negative for abdominal pain, blood in stool, nausea and vomiting.   Genitourinary: Negative for dysuria, frequency and urgency.   Musculoskeletal: Positive for arthralgias. Negative for joint swelling.   Skin: Negative for color change.   Neurological: Negative for syncope, weakness and headaches.   Hematological: Does not bruise/bleed easily.   Psychiatric/Behavioral: The patient is nervous/anxious.        Objective   Vitals:    07/17/19 1306   BP: 120/78   BP Location: Left arm   Patient Position: Sitting   Cuff Size: Adult   Pulse: 55   SpO2: 98%   Weight: 98.9 kg (218 lb)   Height: 180.3 cm (71\")     Physical Exam   Constitutional: He appears well-developed and well-nourished. He is cooperative. He does not have a sickly appearance. He does not appear ill.   HENT:   Head: Normocephalic.   Right Ear: Hearing, tympanic membrane and external ear normal.   Left Ear: Hearing, tympanic membrane and external ear normal.   Nose: Nose normal. No mucosal edema, rhinorrhea, sinus tenderness or nasal deformity. Right sinus " exhibits no maxillary sinus tenderness and no frontal sinus tenderness. Left sinus exhibits no maxillary sinus tenderness and no frontal sinus tenderness.   Mouth/Throat: Oropharynx is clear and moist and mucous membranes are normal. Normal dentition.   Eyes: Conjunctivae and lids are normal. Right eye exhibits no discharge and no exudate. Left eye exhibits no discharge and no exudate.   Neck: Trachea normal. Carotid bruit is not present. No edema present. No thyroid mass present.   Cardiovascular: Regular rhythm, normal heart sounds and normal pulses.   No murmur heard.  Pulmonary/Chest: Breath sounds normal. No respiratory distress. He has no decreased breath sounds. He has no wheezes. He has no rhonchi. He has no rales.   Abdominal: Soft. There is no tenderness.   Lymphadenopathy:        Head (right side): No submental, no submandibular, no tonsillar, no preauricular, no posterior auricular and no occipital adenopathy present.        Head (left side): No submental, no submandibular, no tonsillar, no preauricular, no posterior auricular and no occipital adenopathy present.   Neurological: He is alert.   Skin: Skin is warm, dry and intact. No cyanosis. Nails show no clubbing.       Assessment/Plan   Dwayne was seen today for medicare wellness-initial visit.    Diagnoses and all orders for this visit:    Essential hypertension  -     CBC Auto Differential; Future  -     Comprehensive Metabolic Panel; Future  -     TSH; Future  -     CBC Auto Differential  -     Comprehensive Metabolic Panel  -     TSH    Gastroesophageal reflux disease with esophagitis    Pure hypercholesterolemia    Screening for viral disease  -     Hepatitis C Antibody; Future  -     Hepatitis C Antibody    Screening for prostate cancer  -     PSA Screen; Future  -     PSA Screen    Need for pneumococcal vaccination  -     pneumococcal conj. 13-valent (PREVNAR-13) vaccine 0.5 mL    He has declined a Shingrix vaccine despite known risks.  He is  followed by cardiology and recent labs (4/2019) showed an LDL of 56.  Will check other labs today which are nonfasting.  Discussed increased anxiety.  He has a prescription for Valium at home which was given to him by Dr. Jones for recent procedure.  He may use this on a as needed basis and if he is using frequently consider more of a daily medication.

## 2019-09-13 ENCOUNTER — OFFICE VISIT (OUTPATIENT)
Dept: INTERNAL MEDICINE | Facility: CLINIC | Age: 69
End: 2019-09-13

## 2019-09-13 VITALS
OXYGEN SATURATION: 98 % | WEIGHT: 211 LBS | HEIGHT: 71 IN | BODY MASS INDEX: 29.54 KG/M2 | DIASTOLIC BLOOD PRESSURE: 74 MMHG | HEART RATE: 84 BPM | SYSTOLIC BLOOD PRESSURE: 118 MMHG

## 2019-09-13 DIAGNOSIS — R19.7 DIARRHEA, UNSPECIFIED TYPE: Primary | ICD-10-CM

## 2019-09-13 DIAGNOSIS — J30.9 ALLERGIC RHINITIS, UNSPECIFIED SEASONALITY, UNSPECIFIED TRIGGER: ICD-10-CM

## 2019-09-13 LAB
BASOPHILS # BLD AUTO: 0.04 10*3/MM3 (ref 0–0.2)
BASOPHILS NFR BLD AUTO: 0.6 % (ref 0–1.5)
BILIRUB UR QL CFM: NEGATIVE
BILIRUB UR QL STRIP: ABNORMAL
CLARITY UR: CLEAR
COLOR UR: YELLOW
DEPRECATED RDW RBC AUTO: 42.1 FL (ref 37–54)
EOSINOPHIL # BLD AUTO: 0.23 10*3/MM3 (ref 0–0.4)
EOSINOPHIL NFR BLD AUTO: 3.3 % (ref 0.3–6.2)
ERYTHROCYTE [DISTWIDTH] IN BLOOD BY AUTOMATED COUNT: 13.4 % (ref 12.3–15.4)
GLUCOSE UR STRIP-MCNC: NEGATIVE MG/DL
HCT VFR BLD AUTO: 43.1 % (ref 37.5–51)
HGB BLD-MCNC: 14.6 G/DL (ref 13–17.7)
HGB UR QL STRIP.AUTO: NEGATIVE
KETONES UR QL STRIP: NEGATIVE
LEUKOCYTE ESTERASE UR QL STRIP.AUTO: NEGATIVE
LYMPHOCYTES # BLD AUTO: 1.08 10*3/MM3 (ref 0.7–3.1)
LYMPHOCYTES NFR BLD AUTO: 15.6 % (ref 19.6–45.3)
MCH RBC QN AUTO: 29.8 PG (ref 26.6–33)
MCHC RBC AUTO-ENTMCNC: 33.9 G/DL (ref 31.5–35.7)
MCV RBC AUTO: 88 FL (ref 79–97)
MONOCYTES # BLD AUTO: 0.5 10*3/MM3 (ref 0.1–0.9)
MONOCYTES NFR BLD AUTO: 7.2 % (ref 5–12)
NEUTROPHILS # BLD AUTO: 5.07 10*3/MM3 (ref 1.7–7)
NEUTROPHILS NFR BLD AUTO: 73.3 % (ref 42.7–76)
NITRITE UR QL STRIP: NEGATIVE
PH UR STRIP.AUTO: 5.5 [PH] (ref 5–8)
PLATELET # BLD AUTO: 177 10*3/MM3 (ref 140–450)
PMV BLD AUTO: 9.6 FL (ref 6–12)
PROT UR QL STRIP: NEGATIVE
RBC # BLD AUTO: 4.9 10*6/MM3 (ref 4.14–5.8)
SP GR UR STRIP: >=1.03 (ref 1–1.03)
UROBILINOGEN UR QL STRIP: ABNORMAL
WBC NRBC COR # BLD: 6.92 10*3/MM3 (ref 3.4–10.8)

## 2019-09-13 PROCEDURE — 99213 OFFICE O/P EST LOW 20 MIN: CPT | Performed by: NURSE PRACTITIONER

## 2019-09-13 PROCEDURE — 81003 URINALYSIS AUTO W/O SCOPE: CPT | Performed by: NURSE PRACTITIONER

## 2019-09-13 PROCEDURE — 36415 COLL VENOUS BLD VENIPUNCTURE: CPT | Performed by: NURSE PRACTITIONER

## 2019-09-13 PROCEDURE — 85025 COMPLETE CBC W/AUTO DIFF WBC: CPT | Performed by: NURSE PRACTITIONER

## 2019-09-14 LAB
ALBUMIN SERPL-MCNC: 4.8 G/DL (ref 3.5–5.2)
ALBUMIN/GLOB SERPL: 2.5 G/DL
ALP SERPL-CCNC: 92 U/L (ref 39–117)
ALT SERPL-CCNC: 20 U/L (ref 1–41)
AST SERPL-CCNC: 22 U/L (ref 1–40)
BILIRUB SERPL-MCNC: 0.6 MG/DL (ref 0.2–1.2)
BUN SERPL-MCNC: 12 MG/DL (ref 8–23)
BUN/CREAT SERPL: 14 (ref 7–25)
CALCIUM SERPL-MCNC: 9.4 MG/DL (ref 8.6–10.5)
CHLORIDE SERPL-SCNC: 104 MMOL/L (ref 98–107)
CO2 SERPL-SCNC: 26.1 MMOL/L (ref 22–29)
CREAT SERPL-MCNC: 0.86 MG/DL (ref 0.76–1.27)
GLOBULIN SER CALC-MCNC: 1.9 GM/DL
GLUCOSE SERPL-MCNC: 92 MG/DL (ref 65–99)
POTASSIUM SERPL-SCNC: 4.1 MMOL/L (ref 3.5–5.2)
PROT SERPL-MCNC: 6.7 G/DL (ref 6–8.5)
SODIUM SERPL-SCNC: 142 MMOL/L (ref 136–145)

## 2019-09-15 NOTE — PROGRESS NOTES
Subjective   Dwayne Keita is a 69 y.o. male who presents due to loose stools with epigastric discomfort.    He c/o a dull headache yesterday was intermittent and has since resolved. He also c/o loose, watery stools over the past couple of days with epigastric discomfort. He states sx have improved today but are lingering. Denies rectal bleeding. No nausea or vomiting. No fever/chills.      Diarrhea    This is a new problem. The current episode started yesterday. The problem occurs less than 2 times per day. The problem has been waxing and waning. The stool consistency is described as watery and mucous. The patient states that diarrhea does not awaken him from sleep. Associated symptoms include headaches. Pertinent negatives include no abdominal pain, arthralgias, chills, coughing, fever or vomiting. Exacerbated by: sx began after eating dinner Wed evening. He has tried nothing for the symptoms. There is no history of irritable bowel syndrome.        The following portions of the patient's history were reviewed and updated as appropriate: allergies, current medications, past social history and problem list.    Past Medical History:   Diagnosis Date   • Allergic rhinitis    • Anxiety state    • Benign neoplasm of skin    • Benign positional vertigo    • Coronary artery disease    • Episodic mood disorder (CMS/HCC)    • GERD (gastroesophageal reflux disease)    • Hyperlipidemia    • Hypertension          Current Outpatient Medications:   •  aspirin 81 MG tablet, Take 81 mg by mouth Daily., Disp: , Rfl:   •  atorvastatin (LIPITOR) 80 MG tablet, Take 80 mg by mouth Daily., Disp: , Rfl:   •  diazePAM (VALIUM) 5 MG tablet, Take 1 tablet by mouth Every 8 (Eight) Hours As Needed for Anxiety., Disp: 30 tablet, Rfl: 1  •  diphenhydrAMINE-acetaminophen (TYLENOL PM)  MG tablet per tablet, Take 1 tablet by mouth At Night As Needed for Sleep., Disp: , Rfl:   •  isosorbide mononitrate (IMDUR) 30 MG 24 hr tablet, Take 30  "mg by mouth Daily. Dr Wade, Disp: , Rfl:   •  lisinopril (PRINIVIL,ZESTRIL) 10 MG tablet, , Disp: , Rfl:   •  metoprolol succinate XL (TOPROL-XL) 50 MG 24 hr tablet, Take 50 mg by mouth Daily. 1/2 TABLET DAILY, Disp: , Rfl:   •  NEXIUM 24HR 20 MG tablet delayed-release, , Disp: , Rfl:   •  niacin (NIASPAN) 500 MG CR tablet, Take 500 mg by mouth Every Night., Disp: , Rfl:   •  Omega-3 Fatty Acids (FISH OIL PO), Take 1 capsule by mouth Daily., Disp: , Rfl:   •  sertraline (ZOLOFT) 100 MG tablet, Take 0.5 tablets by mouth Daily., Disp: 45 tablet, Rfl: 3  •  Unable to find, , Disp: , Rfl:     No Known Allergies    Review of Systems   Constitutional: Negative for chills, fatigue, fever and unexpected weight change.   HENT: Positive for congestion and postnasal drip. Negative for ear pain, sinus pressure, sore throat and trouble swallowing.    Eyes: Negative for visual disturbance.   Respiratory: Negative for cough, chest tightness and wheezing.    Cardiovascular: Negative for chest pain, palpitations and leg swelling.   Gastrointestinal: Positive for diarrhea. Negative for abdominal pain, blood in stool, nausea and vomiting.   Genitourinary: Negative for dysuria, frequency and urgency.   Musculoskeletal: Negative for arthralgias and joint swelling.   Skin: Negative for color change.   Neurological: Positive for headaches. Negative for syncope and weakness.   Hematological: Does not bruise/bleed easily.       Objective   Vitals:    09/13/19 1502   BP: 118/74   BP Location: Left arm   Patient Position: Sitting   Cuff Size: Adult   Pulse: 84   SpO2: 98%   Weight: 95.7 kg (211 lb)   Height: 180.3 cm (71\")     Physical Exam   Constitutional: He appears well-developed and well-nourished. He is cooperative. He does not have a sickly appearance. He does not appear ill.   HENT:   Head: Normocephalic.   Right Ear: Hearing, tympanic membrane and external ear normal.   Left Ear: Hearing, tympanic membrane and external ear normal. "   Nose: Nose normal. No mucosal edema, rhinorrhea, sinus tenderness or nasal deformity. Right sinus exhibits no maxillary sinus tenderness and no frontal sinus tenderness. Left sinus exhibits no maxillary sinus tenderness and no frontal sinus tenderness.   Mouth/Throat: Mucous membranes are normal. Normal dentition. Posterior oropharyngeal erythema present.   Eyes: Conjunctivae and lids are normal. Right eye exhibits no discharge and no exudate. Left eye exhibits no discharge and no exudate.   Neck: Trachea normal. Carotid bruit is not present. No edema present. No thyroid mass present.   Cardiovascular: Regular rhythm, normal heart sounds and normal pulses.   No murmur heard.  Pulmonary/Chest: Breath sounds normal. No respiratory distress. He has no decreased breath sounds. He has no wheezes. He has no rhonchi. He has no rales.   Abdominal: Soft. Bowel sounds are normal. There is no tenderness.   Lymphadenopathy:        Head (right side): No submental, no submandibular, no tonsillar, no preauricular, no posterior auricular and no occipital adenopathy present.        Head (left side): No submental, no submandibular, no tonsillar, no preauricular, no posterior auricular and no occipital adenopathy present.   Neurological: He is alert.   Skin: Skin is warm, dry and intact. No cyanosis. Nails show no clubbing.       Assessment/Plan   Dwayne was seen today for headache and diarrhea.    Diagnoses and all orders for this visit:    Diarrhea, unspecified type  -     CBC Auto Differential; Future  -     Comprehensive Metabolic Panel; Future  -     Urinalysis With Microscopic If Indicated (No Culture) - Urine, Clean Catch; Future  -     CBC Auto Differential  -     Comprehensive Metabolic Panel  -     Urinalysis With Microscopic If Indicated (No Culture) - Urine, Clean Catch  -     Ictotest, Urine - Urine, Clean Catch; Future  -     Ictotest, Urine - Urine, Clean Catch    Allergic rhinitis, unspecified seasonality,  unspecified trigger    CBC is normal, UA does not indicate an infection. Sx appear more viral in nature and are improving today. He will start a bland diet and continue to monitor, consider further evaluation if sx persist.  Advised to report to ER with abdominal pain and/or bleeding.

## 2019-10-29 RX ORDER — SERTRALINE HYDROCHLORIDE 100 MG/1
TABLET, FILM COATED ORAL
Qty: 45 TABLET | Refills: 3 | Status: SHIPPED | OUTPATIENT
Start: 2019-10-29 | End: 2020-08-07

## 2020-02-27 ENCOUNTER — OFFICE VISIT (OUTPATIENT)
Dept: INTERNAL MEDICINE | Facility: CLINIC | Age: 70
End: 2020-02-27

## 2020-02-27 VITALS
BODY MASS INDEX: 30.52 KG/M2 | WEIGHT: 218 LBS | SYSTOLIC BLOOD PRESSURE: 122 MMHG | HEART RATE: 61 BPM | HEIGHT: 71 IN | DIASTOLIC BLOOD PRESSURE: 80 MMHG | OXYGEN SATURATION: 98 %

## 2020-02-27 DIAGNOSIS — E78.00 PURE HYPERCHOLESTEROLEMIA: ICD-10-CM

## 2020-02-27 DIAGNOSIS — Z87.891 PERSONAL HISTORY OF TOBACCO USE: ICD-10-CM

## 2020-02-27 DIAGNOSIS — F39 EPISODIC MOOD DISORDER (HCC): ICD-10-CM

## 2020-02-27 DIAGNOSIS — K21.00 GASTROESOPHAGEAL REFLUX DISEASE WITH ESOPHAGITIS: ICD-10-CM

## 2020-02-27 DIAGNOSIS — I10 ESSENTIAL HYPERTENSION: Primary | ICD-10-CM

## 2020-02-27 DIAGNOSIS — Z13.6 SCREENING FOR AAA (ABDOMINAL AORTIC ANEURYSM): ICD-10-CM

## 2020-02-27 LAB
ALBUMIN SERPL-MCNC: 4.3 G/DL (ref 3.5–5.2)
ALBUMIN/GLOB SERPL: 2 G/DL
ALP SERPL-CCNC: 81 U/L (ref 39–117)
ALT SERPL-CCNC: 18 U/L (ref 1–41)
AST SERPL-CCNC: 22 U/L (ref 1–40)
BASOPHILS # BLD AUTO: 0.07 10*3/MM3 (ref 0–0.2)
BASOPHILS NFR BLD AUTO: 1.4 % (ref 0–1.5)
BILIRUB SERPL-MCNC: 0.8 MG/DL (ref 0.2–1.2)
BUN SERPL-MCNC: 14 MG/DL (ref 8–23)
BUN/CREAT SERPL: 16.1 (ref 7–25)
CALCIUM SERPL-MCNC: 9.5 MG/DL (ref 8.6–10.5)
CHLORIDE SERPL-SCNC: 101 MMOL/L (ref 98–107)
CHOLEST SERPL-MCNC: 114 MG/DL (ref 0–200)
CO2 SERPL-SCNC: 28.9 MMOL/L (ref 22–29)
CREAT SERPL-MCNC: 0.87 MG/DL (ref 0.76–1.27)
EOSINOPHIL # BLD AUTO: 0.3 10*3/MM3 (ref 0–0.4)
EOSINOPHIL NFR BLD AUTO: 5.9 % (ref 0.3–6.2)
ERYTHROCYTE [DISTWIDTH] IN BLOOD BY AUTOMATED COUNT: 13.5 % (ref 12.3–15.4)
GLOBULIN SER CALC-MCNC: 2.2 GM/DL
GLUCOSE SERPL-MCNC: 100 MG/DL (ref 65–99)
HCT VFR BLD AUTO: 43.7 % (ref 37.5–51)
HDLC SERPL-MCNC: 29 MG/DL (ref 40–60)
HGB BLD-MCNC: 14.9 G/DL (ref 13–17.7)
IMM GRANULOCYTES # BLD AUTO: 0.02 10*3/MM3 (ref 0–0.05)
IMM GRANULOCYTES NFR BLD AUTO: 0.4 % (ref 0–0.5)
LDLC SERPL CALC-MCNC: 61 MG/DL (ref 0–100)
LYMPHOCYTES # BLD AUTO: 1.28 10*3/MM3 (ref 0.7–3.1)
LYMPHOCYTES NFR BLD AUTO: 25.2 % (ref 19.6–45.3)
MCH RBC QN AUTO: 29.5 PG (ref 26.6–33)
MCHC RBC AUTO-ENTMCNC: 34.1 G/DL (ref 31.5–35.7)
MCV RBC AUTO: 86.5 FL (ref 79–97)
MONOCYTES # BLD AUTO: 0.43 10*3/MM3 (ref 0.1–0.9)
MONOCYTES NFR BLD AUTO: 8.5 % (ref 5–12)
NEUTROPHILS # BLD AUTO: 2.98 10*3/MM3 (ref 1.7–7)
NEUTROPHILS NFR BLD AUTO: 58.6 % (ref 42.7–76)
NRBC BLD AUTO-RTO: 0 /100 WBC (ref 0–0.2)
PLATELET # BLD AUTO: 207 10*3/MM3 (ref 140–450)
POTASSIUM SERPL-SCNC: 4.3 MMOL/L (ref 3.5–5.2)
PROT SERPL-MCNC: 6.5 G/DL (ref 6–8.5)
RBC # BLD AUTO: 5.05 10*6/MM3 (ref 4.14–5.8)
SODIUM SERPL-SCNC: 139 MMOL/L (ref 136–145)
TRIGL SERPL-MCNC: 119 MG/DL (ref 0–150)
TSH SERPL DL<=0.005 MIU/L-ACNC: 1.57 UIU/ML (ref 0.27–4.2)
VLDLC SERPL CALC-MCNC: 23.8 MG/DL
WBC # BLD AUTO: 5.08 10*3/MM3 (ref 3.4–10.8)

## 2020-02-27 PROCEDURE — 99214 OFFICE O/P EST MOD 30 MIN: CPT | Performed by: NURSE PRACTITIONER

## 2020-02-27 RX ORDER — OMEGA-3S/DHA/EPA/FISH OIL/D3 300MG-1000
400 CAPSULE ORAL DAILY
COMMUNITY

## 2020-02-27 NOTE — PROGRESS NOTES
Subjective   Dwayne Keita is a 70 y.o. male who presents for f/u regarding HTN, hyperlipidemia and GERD.    He continues to c/o significant amount of stress at work, currently taking Sertraline for mgmt of anxiety with good results.   He was advised to d/c Niaspan due to lack of data showing benefits of medication, cardiology advised to restart medication which he is tolerating well.  He has a previous hx of tobacco use, quit in 2008 after smoking 1.5 years for 45 years.    Hypertension   This is a chronic problem. The current episode started more than 1 year ago. The problem is unchanged. The problem is controlled. Associated symptoms include malaise/fatigue. Pertinent negatives include no chest pain, headaches, palpitations, peripheral edema or shortness of breath. Current antihypertension treatment includes ACE inhibitors and beta blockers. The current treatment provides significant improvement. There are no compliance problems.    Hyperlipidemia   This is a chronic problem. The current episode started more than 1 year ago. The problem is controlled. He has no history of diabetes or hypothyroidism. Pertinent negatives include no chest pain or shortness of breath. Current antihyperlipidemic treatment includes statins and nicotinic acid. The current treatment provides significant improvement of lipids. There are no compliance problems.         The following portions of the patient's history were reviewed and updated as appropriate: allergies, current medications, past social history and problem list.    Past Medical History:   Diagnosis Date   • Allergic rhinitis    • Anxiety state    • Benign neoplasm of skin    • Benign positional vertigo    • Coronary artery disease    • Episodic mood disorder (CMS/HCC)    • GERD (gastroesophageal reflux disease)    • Hyperlipidemia    • Hypertension          Current Outpatient Medications:   •  aspirin 81 MG tablet, Take 81 mg by mouth Daily., Disp: , Rfl:   •  atorvastatin  (LIPITOR) 80 MG tablet, Take 80 mg by mouth Daily., Disp: , Rfl:   •  cholecalciferol (VITAMIN D3) 10 MCG (400 UNIT) tablet, Take 400 Units by mouth Daily., Disp: , Rfl:   •  diphenhydrAMINE-acetaminophen (TYLENOL PM)  MG tablet per tablet, Take 1 tablet by mouth At Night As Needed for Sleep., Disp: , Rfl:   •  isosorbide mononitrate (IMDUR) 30 MG 24 hr tablet, Take 30 mg by mouth Daily. Dr Wade, Disp: , Rfl:   •  lisinopril (PRINIVIL,ZESTRIL) 10 MG tablet, , Disp: , Rfl:   •  metoprolol succinate XL (TOPROL-XL) 50 MG 24 hr tablet, Take 50 mg by mouth Daily. 1/2 TABLET DAILY, Disp: , Rfl:   •  NEXIUM 24HR 20 MG tablet delayed-release, , Disp: , Rfl:   •  niacin (NIASPAN) 500 MG CR tablet, Take 500 mg by mouth Every Night., Disp: , Rfl:   •  Omega-3 Fatty Acids (FISH OIL PO), Take 1 capsule by mouth Daily., Disp: , Rfl:   •  sertraline (ZOLOFT) 100 MG tablet, TAKE 1/2 TABLET EVERY DAY, Disp: 45 tablet, Rfl: 3    No Known Allergies    Review of Systems   Constitutional: Positive for malaise/fatigue. Negative for chills, fatigue, fever and unexpected weight change.   HENT: Positive for congestion and postnasal drip. Negative for ear pain, sinus pressure, sore throat and trouble swallowing.    Eyes: Negative for visual disturbance.   Respiratory: Negative for cough, chest tightness, shortness of breath and wheezing.    Cardiovascular: Negative for chest pain, palpitations and leg swelling.   Gastrointestinal: Negative for abdominal pain, blood in stool, nausea and vomiting.        Dyspepsia controlled with Nexium OTC   Genitourinary: Negative for dysuria, frequency and urgency.   Musculoskeletal: Positive for arthralgias. Negative for joint swelling.   Skin: Negative for color change.   Neurological: Negative for syncope, weakness and headaches.   Hematological: Does not bruise/bleed easily.   Psychiatric/Behavioral: The patient is nervous/anxious.        Objective   Vitals:    02/27/20 0927   BP: 122/80   BP  "Location: Left arm   Patient Position: Sitting   Cuff Size: Adult   Pulse: 61   SpO2: 98%   Weight: 98.9 kg (218 lb)   Height: 180.3 cm (71\")     Body mass index is 30.4 kg/m².  Physical Exam   Constitutional: He appears well-developed and well-nourished. He is cooperative. He does not have a sickly appearance. He does not appear ill.   HENT:   Head: Normocephalic.   Right Ear: Hearing, tympanic membrane and external ear normal.   Left Ear: Hearing, tympanic membrane and external ear normal.   Nose: Nose normal. No mucosal edema, rhinorrhea, sinus tenderness or nasal deformity. Right sinus exhibits no maxillary sinus tenderness and no frontal sinus tenderness. Left sinus exhibits no maxillary sinus tenderness and no frontal sinus tenderness.   Mouth/Throat: Oropharynx is clear and moist and mucous membranes are normal. Normal dentition.   Eyes: Conjunctivae and lids are normal. Right eye exhibits no discharge and no exudate. Left eye exhibits no discharge and no exudate.   Neck: Trachea normal. Carotid bruit is not present. No edema present. No thyroid mass present.   Cardiovascular: Regular rhythm, normal heart sounds and normal pulses.   No murmur heard.  Pulmonary/Chest: Breath sounds normal. No respiratory distress. He has no decreased breath sounds. He has no wheezes. He has no rhonchi. He has no rales.   Abdominal: Soft. There is no tenderness.   Lymphadenopathy:        Head (right side): No submental, no submandibular, no tonsillar, no preauricular, no posterior auricular and no occipital adenopathy present.        Head (left side): No submental, no submandibular, no tonsillar, no preauricular, no posterior auricular and no occipital adenopathy present.   Neurological: He is alert.   Skin: Skin is warm, dry and intact. No cyanosis. Nails show no clubbing.       Assessment/Plan   Dwayne was seen today for hypertension, hyperlipidemia and heartburn.    Diagnoses and all orders for this visit:    Essential " hypertension  -     CBC & Differential  -     Comprehensive Metabolic Panel  -     TSH    Pure hypercholesterolemia  -     Lipid Panel    Gastroesophageal reflux disease with esophagitis    Personal history of tobacco use  -     [place] aaa screen limited; Future    Screening for AAA (abdominal aortic aneurysm)  -     [place] aaa screen limited; Future    1. BP is well-controlled on current medication which he will continue.  2. He is tolerating Atorvastatin daily. I do not feel he needs to continue Niacin but cardiology has asked him to do so and he is tolerating well.  3. Sx are well-controlled with Nexium.  4&5. He quit smoking in 2008, will order u/s to check for AAA.  6. Sx managed on Sertraline.

## 2020-03-09 ENCOUNTER — HOSPITAL ENCOUNTER (OUTPATIENT)
Dept: ULTRASOUND IMAGING | Facility: HOSPITAL | Age: 70
Discharge: HOME | End: 2020-03-09
Admitting: NURSE PRACTITIONER

## 2020-03-09 DIAGNOSIS — Z13.6 SCREENING FOR AAA (ABDOMINAL AORTIC ANEURYSM): ICD-10-CM

## 2020-03-09 DIAGNOSIS — Z87.891 PERSONAL HISTORY OF TOBACCO USE: ICD-10-CM

## 2020-03-09 PROCEDURE — 76706 US ABDL AORTA SCREEN AAA: CPT

## 2020-03-10 ENCOUNTER — TELEPHONE (OUTPATIENT)
Dept: INTERNAL MEDICINE | Facility: CLINIC | Age: 70
End: 2020-03-10

## 2020-03-10 NOTE — TELEPHONE ENCOUNTER
----- Message from AMBROSIO Wong sent at 3/10/2020  7:02 AM EDT -----  Please notify pt his ultrasound was negative for an aneurysm. Thanks.

## 2020-06-30 ENCOUNTER — TELEPHONE (OUTPATIENT)
Dept: INTERNAL MEDICINE | Facility: CLINIC | Age: 70
End: 2020-06-30

## 2020-07-01 ENCOUNTER — TELEPHONE (OUTPATIENT)
Dept: INTERNAL MEDICINE | Facility: CLINIC | Age: 70
End: 2020-07-01

## 2020-07-01 NOTE — TELEPHONE ENCOUNTER
GENE, WIFE, CALLING IN  AGAIN TO CHECK ON THE REFERAL REQUEST FOR HER HUSBANDS FOOT. SHE IS CONCERNED THAT IT IS HEAVILY INFECTED AND HAS AN INGROWN TOENAIL. SHE IS REQUESTING A REFERRAL FOR A FOOT DOCTOR.    PLEASE CALL AND UPDATE [phone]

## 2020-07-01 NOTE — TELEPHONE ENCOUNTER
Dr. Gaytan, poditary ([address] [facility])--[phone]. However, if he cannot get him in let's work him in so we can start medication if needed. Thanks.

## 2020-08-07 RX ORDER — SERTRALINE HYDROCHLORIDE 100 MG/1
TABLET, FILM COATED ORAL
Qty: 45 TABLET | Refills: 3 | Status: SHIPPED | OUTPATIENT
Start: 2020-08-07 | End: 2021-06-11 | Stop reason: SDUPTHER

## 2020-10-08 ENCOUNTER — OFFICE VISIT (OUTPATIENT)
Dept: INTERNAL MEDICINE | Facility: CLINIC | Age: 70
End: 2020-10-08

## 2020-10-08 VITALS
HEART RATE: 68 BPM | WEIGHT: 223 LBS | DIASTOLIC BLOOD PRESSURE: 72 MMHG | TEMPERATURE: 98 F | SYSTOLIC BLOOD PRESSURE: 112 MMHG | HEIGHT: 71 IN | BODY MASS INDEX: 31.22 KG/M2 | OXYGEN SATURATION: 98 %

## 2020-10-08 DIAGNOSIS — I10 ESSENTIAL HYPERTENSION: Primary | ICD-10-CM

## 2020-10-08 DIAGNOSIS — Z23 NEED FOR VACCINATION: ICD-10-CM

## 2020-10-08 DIAGNOSIS — R42 DIZZINESS: ICD-10-CM

## 2020-10-08 DIAGNOSIS — Z12.5 SCREENING FOR PROSTATE CANCER: ICD-10-CM

## 2020-10-08 DIAGNOSIS — Z12.2 ENCOUNTER FOR SCREENING FOR LUNG CANCER: ICD-10-CM

## 2020-10-08 DIAGNOSIS — E78.00 PURE HYPERCHOLESTEROLEMIA: ICD-10-CM

## 2020-10-08 DIAGNOSIS — F17.211 NICOTINE DEPENDENCE, CIGARETTES, IN REMISSION: ICD-10-CM

## 2020-10-08 PROCEDURE — 96160 PT-FOCUSED HLTH RISK ASSMT: CPT | Performed by: NURSE PRACTITIONER

## 2020-10-08 PROCEDURE — 90694 VACC AIIV4 NO PRSRV 0.5ML IM: CPT | Performed by: NURSE PRACTITIONER

## 2020-10-08 PROCEDURE — 90732 PPSV23 VACC 2 YRS+ SUBQ/IM: CPT | Performed by: NURSE PRACTITIONER

## 2020-10-08 PROCEDURE — G0008 ADMIN INFLUENZA VIRUS VAC: HCPCS | Performed by: NURSE PRACTITIONER

## 2020-10-08 PROCEDURE — G0439 PPPS, SUBSEQ VISIT: HCPCS | Performed by: NURSE PRACTITIONER

## 2020-10-08 PROCEDURE — G0009 ADMIN PNEUMOCOCCAL VACCINE: HCPCS | Performed by: NURSE PRACTITIONER

## 2020-10-08 PROCEDURE — 99214 OFFICE O/P EST MOD 30 MIN: CPT | Performed by: NURSE PRACTITIONER

## 2020-10-08 RX ORDER — MECLIZINE HCL 12.5 MG/1
12.5 TABLET ORAL 3 TIMES DAILY PRN
Qty: 30 TABLET | Refills: 0 | Status: SHIPPED | OUTPATIENT
Start: 2020-10-08

## 2020-10-08 NOTE — PROGRESS NOTES
The ABCs of the Annual Wellness Visit  Subsequent Medicare Wellness Visit    Chief Complaint   Patient presents with   • Medicare Wellness-subsequent   • Hypertension   • Hyperlipidemia   • Dizziness       Subjective   History of Present Illness:  Dwayne Keita is a 70 y.o. male who presents for a Subsequent Medicare Wellness Visit.    HEALTH RISK ASSESSMENT    Recent Hospitalizations:  No hospitalization(s) within the last year.    Current Medical Providers:  Patient Care Team:  Ivelisse Casarez APRN as PCP - General (Internal Medicine)    Smoking Status:  Social History     Tobacco Use   Smoking Status Former Smoker   • Packs/day: 1.50   • Years: 45.00   • Pack years: 67.50   • Types: Cigarettes   • Quit date:    • Years since quittin.7       Alcohol Consumption:  Social History     Substance and Sexual Activity   Alcohol Use No       Depression Screen:   PHQ-2/PHQ-9 Depression Screening 10/8/2020   Little interest or pleasure in doing things 0   Feeling down, depressed, or hopeless 0   Trouble falling or staying asleep, or sleeping too much 1   Feeling tired or having little energy 0   Poor appetite or overeating 0   Feeling bad about yourself - or that you are a failure or have let yourself or your family down 0   Trouble concentrating on things, such as reading the newspaper or watching television 0   Moving or speaking so slowly that other people could have noticed. Or the opposite - being so fidgety or restless that you have been moving around a lot more than usual 0   Thoughts that you would be better off dead, or of hurting yourself in some way 0   Total Score 1   If you checked off any problems, how difficult have these problems made it for you to do your work, take care of things at home, or get along with other people? Not difficult at all       Fall Risk Screen:  STEADI Fall Risk Assessment was completed, and patient is at LOW risk for falls.Assessment completed on:10/8/2020    Health  Habits and Functional and Cognitive Screening:  Functional & Cognitive Status 10/8/2020   Do you have difficulty preparing food and eating? No   Do you have difficulty bathing yourself, getting dressed or grooming yourself? No   Do you have difficulty using the toilet? No   Do you have difficulty moving around from place to place? No   Do you have trouble with steps or getting out of a bed or a chair? No   Current Diet Unhealthy Diet   Dental Exam Up to date   Eye Exam Up to date   Exercise (times per week) 7 times per week   Current Exercises Include Walking   Current Exercise Activities Include -   Do you need help using the phone?  No   Are you deaf or do you have serious difficulty hearing?  Yes   Do you need help with transportation? No   Do you need help shopping? No   Do you need help preparing meals?  No   Do you need help with housework?  No   Do you need help with laundry? No   Do you need help taking your medications? No   Do you need help managing money? No   Do you ever drive or ride in a car without wearing a seat belt? No   Have you felt unusual stress, anger or loneliness in the last month? No   Who do you live with? Spouse   If you need help, do you have trouble finding someone available to you? No   Have you been bothered in the last four weeks by sexual problems? No   Do you have difficulty concentrating, remembering or making decisions? No         Does the patient have evidence of cognitive impairment? No    Asprin use counseling:Taking ASA appropriately as indicated    Age-appropriate Screening Schedule:  Refer to the list below for future screening recommendations based on patient's age, sex and/or medical conditions. Orders for these recommended tests are listed in the plan section. The patient has been provided with a written plan.    Health Maintenance   Topic Date Due   • ZOSTER VACCINE (1 of 2) 01/22/1998   • TDAP/TD VACCINES (1 - Tdap) 07/17/2029 (Originally 1/22/1969)   • LIPID PANEL   02/27/2021   • COLONOSCOPY  03/04/2026   • INFLUENZA VACCINE  Completed          The following portions of the patient's history were reviewed and updated as appropriate: allergies, current medications, past family history, past medical history, past social history, past surgical history and problem list.    Outpatient Medications Prior to Visit   Medication Sig Dispense Refill   • aspirin 81 MG tablet Take 81 mg by mouth Daily.     • atorvastatin (LIPITOR) 80 MG tablet Take 80 mg by mouth Daily.     • cholecalciferol (VITAMIN D3) 10 MCG (400 UNIT) tablet Take 400 Units by mouth Daily.     • diphenhydrAMINE-acetaminophen (TYLENOL PM)  MG tablet per tablet Take 1 tablet by mouth At Night As Needed for Sleep.     • isosorbide mononitrate (IMDUR) 30 MG 24 hr tablet Take 30 mg by mouth Daily. Dr Wade     • lisinopril (PRINIVIL,ZESTRIL) 10 MG tablet      • metoprolol succinate XL (TOPROL-XL) 50 MG 24 hr tablet Take 50 mg by mouth Daily. 1/2 TABLET DAILY     • NEXIUM 24HR 20 MG tablet delayed-release      • niacin (NIASPAN) 500 MG CR tablet Take 500 mg by mouth Every Night.     • Omega-3 Fatty Acids (FISH OIL PO) Take 1 capsule by mouth Daily.     • sertraline (ZOLOFT) 100 MG tablet TAKE 1/2 TABLET EVERY DAY 45 tablet 3     No facility-administered medications prior to visit.        Patient Active Problem List   Diagnosis   • Benign positional vertigo   • Hypertension   • Hyperlipidemia   • Coronary arteriosclerosis in native artery   • GERD (gastroesophageal reflux disease)   • Benign neoplasm of skin   • Anxiety state   • Episodic mood disorder (CMS/HCC)   • Allergic rhinitis   • Chest discomfort   • Dyspnea   • Fatigue   • Acute bilateral low back pain without sciatica       Advanced Care Planning:  ACP discussion was held with the patient during this visit. Patient has an advance directive (not in EMR), copy requested.    Review of Systems    Compared to one year ago, the patient feels his physical health is  "the same.  Compared to one year ago, the patient feels his mental health is the same.    Reviewed chart for potential of high risk medication in the elderly: yes  Reviewed chart for potential of harmful drug interactions in the elderly:yes    Objective         Vitals:    10/08/20 0844   BP: 112/72   BP Location: Left arm   Patient Position: Sitting   Cuff Size: Adult   Pulse: 68   Temp: 98 °F (36.7 °C)   TempSrc: Oral   SpO2: 98%   Weight: 101 kg (223 lb)   Height: 180.3 cm (71\")   PainSc:   2   PainLoc: Leg       Body mass index is 31.1 kg/m².  Discussed the patient's BMI with him. The BMI is above average; BMI management plan is completed.    Physical Exam          Assessment/Plan   Medicare Risks and Personalized Health Plan  CMS Preventative Services Quick Reference  Immunizations Discussed/Encouraged (specific immunizations; Pneumococcal 23 and Shingrix )  Lung Cancer Risk    The above risks/problems have been discussed with the patient.  Pertinent information has been shared with the patient in the After Visit Summary.  Follow up plans and orders are seen below in the Assessment/Plan Section.    Diagnoses and all orders for this visit:    1. Essential hypertension (Primary)  -     CBC & Differential; Future  -     Comprehensive Metabolic Panel; Future  -     Lipid Panel; Future    2. Pure hypercholesterolemia    3. Dizziness  -     meclizine (ANTIVERT) 12.5 MG tablet; Take 1 tablet by mouth 3 (Three) Times a Day As Needed for Dizziness.  Dispense: 30 tablet; Refill: 0    4. Encounter for screening for lung cancer  -     CT Chest Low Dose Wo; Future    5. Screening for prostate cancer  -     PSA Screen; Future    6. Need for vaccination  -     Fluad Quad >65 years  -     Pneumococcal Polysaccharide Vaccine 23-Valent (PPSV23) Greater Than or Equal To 1yo Subcutaneous / IM    7. Nicotine dependence, cigarettes, in remission   -     CT Chest Low Dose Wo; Future      Follow Up:  Return in about 6 months (around " 4/8/2021).     An After Visit Summary and PPPS were given to the patient.

## 2020-10-09 ENCOUNTER — RESULTS ENCOUNTER (OUTPATIENT)
Dept: INTERNAL MEDICINE | Facility: CLINIC | Age: 70
End: 2020-10-09

## 2020-10-09 DIAGNOSIS — Z12.5 SCREENING FOR PROSTATE CANCER: ICD-10-CM

## 2020-10-09 DIAGNOSIS — I10 ESSENTIAL HYPERTENSION: ICD-10-CM

## 2020-10-11 NOTE — PATIENT INSTRUCTIONS
Medicare Wellness  Personal Prevention Plan of Service     Date of Office Visit:  10/08/2020  Encounter Provider:  AMBROSIO Chance  Place of Service:  [facility]  Patient Name: Dwayne Keita  :  1950    As part of the Medicare Wellness portion of your visit today, we are providing you with this personalized preventive plan of services (PPPS). This plan is based upon recommendations of the [place] Preventive Services Task Force (USPSTF) and the Advisory Committee on Immunization Practices (ACIP).    This lists the preventive care services that should be considered, and provides dates of when you are due. Items listed as completed are up-to-date and do not require any further intervention.    Health Maintenance   Topic Date Due   • ZOSTER VACCINE (1 of 2) 1998   • LUNG CANCER SCREENING  2003   • ANNUAL WELLNESS VISIT  2020   • TDAP/TD VACCINES (1 - Tdap) 2029 (Originally 1967)   • LIPID PANEL  2021   • COLONOSCOPY  2026   • HEPATITIS C SCREENING  Completed   • INFLUENZA VACCINE  Completed   • Pneumococcal Vaccine 65+  Completed   • AAA SCREEN (ONE-TIME)  Completed       Orders Placed This Encounter   Procedures   • CT Chest Low Dose Wo     Standing Status:   Future     Standing Expiration Date:   10/11/2021     Order Specific Question:   The patient is age 55-77:     Answer:   70     Order Specific Question:   The patient is a current smoker?     Answer:   No     Order Specific Question:   The patient is a former smoker who has quit within the last 15 years?     Answer:   Yes     Order Specific Question:   The number of years since quitting smoking.     Answer:   12     Order Specific Question:   The patient has a smoking history of 30 pack-years or greater:     Answer:   Yes     Order Specific Question:   Actual pack - year smoking history (number):     Answer:   67     Order Specific Question:   Has the Patient had  a Chest CT scan within the past 12 months?     Answer:   No     Order Specific Question:   Does the patient have any clinical signs/symptoms of lung cancer?     Answer:   No     Order Specific Question:   The patient was engaged in shared decision-making for this test:     Answer:   Yes   • Fluad Quad >65 years   • Pneumococcal Polysaccharide Vaccine 23-Valent (PPSV23) Greater Than or Equal To 3yo Subcutaneous / IM   • Comprehensive Metabolic Panel     Standing Status:   Future     Number of Occurrences:   1     Standing Expiration Date:   10/8/2021   • Lipid Panel     Standing Status:   Future     Number of Occurrences:   1     Standing Expiration Date:   10/8/2021   • PSA Screen     Standing Status:   Future     Number of Occurrences:   1     Standing Expiration Date:   10/8/2021     Scheduling Instructions:      Medicare   • CBC & Differential     Standing Status:   Future     Number of Occurrences:   1     Standing Expiration Date:   10/8/2021     Order Specific Question:   Manual Differential     Answer:   No       Return in about 6 months (around 4/8/2021).

## 2020-10-11 NOTE — PROGRESS NOTES
Subjective   Dwayne Keita is a 70 y.o. male who presents for f/u regarding HTN, hyperlipidemia and intermittent episodes of feeling lightheaded.    He c/o intermittent episodes of feeling lightheaded (no current sx), requests Rx for Meclizine for PRN use which has been helpful in the past.    Hypertension  This is a chronic problem. The current episode started more than 1 year ago. The problem is unchanged. The problem is controlled. Pertinent negatives include no chest pain, headaches, palpitations, peripheral edema or shortness of breath. Current antihypertension treatment includes ACE inhibitors. The current treatment provides significant improvement. There are no compliance problems.    Hyperlipidemia  This is a chronic problem. The current episode started more than 1 year ago. The problem is controlled. Recent lipid tests were reviewed and are low. He has no history of diabetes or hypothyroidism. Pertinent negatives include no chest pain or shortness of breath. Current antihyperlipidemic treatment includes statins. The current treatment provides significant improvement of lipids. There are no compliance problems.    Dizziness  Associated symptoms include congestion. Pertinent negatives include no abdominal pain, arthralgias, chest pain, chills, coughing, fatigue, fever, headaches, joint swelling, nausea, sore throat, vomiting or weakness.        The following portions of the patient's history were reviewed and updated as appropriate: allergies, current medications, past social history and problem list.    Past Medical History:   Diagnosis Date   • Allergic rhinitis    • Anxiety state    • Benign neoplasm of skin    • Benign positional vertigo    • Coronary artery disease    • Episodic mood disorder (CMS/HCC)    • GERD (gastroesophageal reflux disease)    • Hyperlipidemia    • Hypertension          Current Outpatient Medications:   •  aspirin 81 MG tablet, Take 81 mg by mouth Daily., Disp: , Rfl:   •   atorvastatin (LIPITOR) 80 MG tablet, Take 80 mg by mouth Daily., Disp: , Rfl:   •  cholecalciferol (VITAMIN D3) 10 MCG (400 UNIT) tablet, Take 400 Units by mouth Daily., Disp: , Rfl:   •  diphenhydrAMINE-acetaminophen (TYLENOL PM)  MG tablet per tablet, Take 1 tablet by mouth At Night As Needed for Sleep., Disp: , Rfl:   •  isosorbide mononitrate (IMDUR) 30 MG 24 hr tablet, Take 30 mg by mouth Daily. Dr Wade, Disp: , Rfl:   •  lisinopril (PRINIVIL,ZESTRIL) 10 MG tablet, , Disp: , Rfl:   •  metoprolol succinate XL (TOPROL-XL) 50 MG 24 hr tablet, Take 50 mg by mouth Daily. 1/2 TABLET DAILY, Disp: , Rfl:   •  NEXIUM 24HR 20 MG tablet delayed-release, , Disp: , Rfl:   •  niacin (NIASPAN) 500 MG CR tablet, Take 500 mg by mouth Every Night., Disp: , Rfl:   •  Omega-3 Fatty Acids (FISH OIL PO), Take 1 capsule by mouth Daily., Disp: , Rfl:   •  sertraline (ZOLOFT) 100 MG tablet, TAKE 1/2 TABLET EVERY DAY, Disp: 45 tablet, Rfl: 3  •  meclizine (ANTIVERT) 12.5 MG tablet, Take 1 tablet by mouth 3 (Three) Times a Day As Needed for Dizziness., Disp: 30 tablet, Rfl: 0    No Known Allergies    Review of Systems   Constitutional: Negative for chills, fatigue, fever and unexpected weight change.   HENT: Positive for congestion and postnasal drip. Negative for ear pain, sinus pressure, sore throat and trouble swallowing.    Eyes: Negative for visual disturbance.   Respiratory: Negative for cough, chest tightness, shortness of breath and wheezing.    Cardiovascular: Negative for chest pain, palpitations and leg swelling.   Gastrointestinal: Negative for abdominal pain, blood in stool, nausea and vomiting.   Genitourinary: Negative for dysuria, frequency and urgency.   Musculoskeletal: Negative for arthralgias and joint swelling.   Skin: Negative for color change.   Neurological: Positive for dizziness and light-headedness. Negative for syncope, weakness and headaches.   Hematological: Does not bruise/bleed easily.  "  Psychiatric/Behavioral: The patient is nervous/anxious (improved with Sertraline).        Objective   Vitals:    10/08/20 0844   BP: 112/72   BP Location: Left arm   Patient Position: Sitting   Cuff Size: Adult   Pulse: 68   Temp: 98 °F (36.7 °C)   TempSrc: Oral   SpO2: 98%   Weight: 101 kg (223 lb)   Height: 180.3 cm (71\")     Body mass index is 31.1 kg/m².  Physical Exam  Constitutional:       Appearance: He is well-developed. He is not ill-appearing.   HENT:      Head: Normocephalic.      Right Ear: Hearing, tympanic membrane and external ear normal.      Left Ear: Hearing, tympanic membrane and external ear normal.      Nose: Nose normal. No nasal deformity, mucosal edema or rhinorrhea.      Right Sinus: No maxillary sinus tenderness or frontal sinus tenderness.      Left Sinus: No maxillary sinus tenderness or frontal sinus tenderness.      Mouth/Throat:      Dentition: Normal dentition.   Eyes:      General: Lids are normal.         Right eye: No discharge.         Left eye: No discharge.      Conjunctiva/sclera: Conjunctivae normal.      Right eye: No exudate.     Left eye: No exudate.  Neck:      Musculoskeletal: Normal range of motion. No edema.      Thyroid: No thyroid mass or thyromegaly.      Vascular: No carotid bruit.      Trachea: Trachea normal.   Cardiovascular:      Rate and Rhythm: Regular rhythm.      Pulses: Normal pulses.      Heart sounds: Normal heart sounds. No murmur.   Pulmonary:      Effort: No respiratory distress.      Breath sounds: Normal breath sounds. No decreased breath sounds, wheezing, rhonchi or rales.   Abdominal:      General: Bowel sounds are normal.      Palpations: Abdomen is soft.      Tenderness: There is no abdominal tenderness.   Lymphadenopathy:      Head:      Right side of head: No submental, submandibular, tonsillar, preauricular, posterior auricular or occipital adenopathy.      Left side of head: No submental, submandibular, tonsillar, preauricular, posterior " auricular or occipital adenopathy.   Skin:     General: Skin is warm and dry.      Nails: There is no clubbing.     Neurological:      Mental Status: He is alert.   Psychiatric:         Behavior: Behavior is cooperative.         Assessment/Plan   Dwayne was seen today for medicare wellness-subsequent, hypertension, hyperlipidemia and dizziness.    Diagnoses and all orders for this visit:    Essential hypertension  -     CBC & Differential; Future  -     Comprehensive Metabolic Panel; Future  -     Lipid Panel; Future    Pure hypercholesterolemia    Dizziness  -     meclizine (ANTIVERT) 12.5 MG tablet; Take 1 tablet by mouth 3 (Three) Times a Day As Needed for Dizziness.    Encounter for screening for lung cancer  -     CT Chest Low Dose Wo; Future    Screening for prostate cancer  -     PSA Screen; Future    Need for vaccination  -     Fluad Quad >65 years  -     Pneumococcal Polysaccharide Vaccine 23-Valent (PPSV23) Greater Than or Equal To 3yo Subcutaneous / IM    Nicotine dependence, cigarettes, in remission   -     CT Chest Low Dose Wo; Future    1. HTN-BP is excellent in the office, continue current medications along with a low-sodium diet.  2. Hyperlipidemia-he is tolerating statin therapy, recheck lipid panel today.  3. Dizziness-c/o intermittent episodes of feeling lightheaded, check labs today.  4. Previous hx of tobacco use-send for low dose CT scan for screening purposes.

## 2020-10-16 LAB
ALBUMIN SERPL-MCNC: 4.5 G/DL (ref 3.5–5.2)
ALBUMIN/GLOB SERPL: 2.1 G/DL
ALP SERPL-CCNC: 99 U/L (ref 39–117)
ALT SERPL-CCNC: 21 U/L (ref 1–41)
AST SERPL-CCNC: 21 U/L (ref 1–40)
BASOPHILS # BLD AUTO: 0.06 10*3/MM3 (ref 0–0.2)
BASOPHILS NFR BLD AUTO: 1.2 % (ref 0–1.5)
BILIRUB SERPL-MCNC: 0.6 MG/DL (ref 0–1.2)
BUN SERPL-MCNC: 16 MG/DL (ref 8–23)
BUN/CREAT SERPL: 18.8 (ref 7–25)
CALCIUM SERPL-MCNC: 9.5 MG/DL (ref 8.6–10.5)
CHLORIDE SERPL-SCNC: 101 MMOL/L (ref 98–107)
CHOLEST SERPL-MCNC: 116 MG/DL (ref 0–200)
CO2 SERPL-SCNC: 29.3 MMOL/L (ref 22–29)
CREAT SERPL-MCNC: 0.85 MG/DL (ref 0.76–1.27)
EOSINOPHIL # BLD AUTO: 0.29 10*3/MM3 (ref 0–0.4)
EOSINOPHIL NFR BLD AUTO: 5.6 % (ref 0.3–6.2)
ERYTHROCYTE [DISTWIDTH] IN BLOOD BY AUTOMATED COUNT: 13.7 % (ref 12.3–15.4)
GLOBULIN SER CALC-MCNC: 2.1 GM/DL
GLUCOSE SERPL-MCNC: 102 MG/DL (ref 65–99)
HCT VFR BLD AUTO: 45.3 % (ref 37.5–51)
HDLC SERPL-MCNC: 33 MG/DL (ref 40–60)
HGB BLD-MCNC: 15.4 G/DL (ref 13–17.7)
IMM GRANULOCYTES # BLD AUTO: 0.02 10*3/MM3 (ref 0–0.05)
IMM GRANULOCYTES NFR BLD AUTO: 0.4 % (ref 0–0.5)
LDLC SERPL CALC-MCNC: 62 MG/DL (ref 0–100)
LYMPHOCYTES # BLD AUTO: 1.33 10*3/MM3 (ref 0.7–3.1)
LYMPHOCYTES NFR BLD AUTO: 25.8 % (ref 19.6–45.3)
MCH RBC QN AUTO: 29.6 PG (ref 26.6–33)
MCHC RBC AUTO-ENTMCNC: 34 G/DL (ref 31.5–35.7)
MCV RBC AUTO: 87.1 FL (ref 79–97)
MONOCYTES # BLD AUTO: 0.35 10*3/MM3 (ref 0.1–0.9)
MONOCYTES NFR BLD AUTO: 6.8 % (ref 5–12)
NEUTROPHILS # BLD AUTO: 3.11 10*3/MM3 (ref 1.7–7)
NEUTROPHILS NFR BLD AUTO: 60.2 % (ref 42.7–76)
NRBC BLD AUTO-RTO: 0 /100 WBC (ref 0–0.2)
PLATELET # BLD AUTO: 222 10*3/MM3 (ref 140–450)
POTASSIUM SERPL-SCNC: 4.7 MMOL/L (ref 3.5–5.2)
PROT SERPL-MCNC: 6.6 G/DL (ref 6–8.5)
PSA SERPL-MCNC: 0.51 NG/ML (ref 0–4)
RBC # BLD AUTO: 5.2 10*6/MM3 (ref 4.14–5.8)
SODIUM SERPL-SCNC: 138 MMOL/L (ref 136–145)
TRIGL SERPL-MCNC: 114 MG/DL (ref 0–150)
VLDLC SERPL CALC-MCNC: 21 MG/DL (ref 5–40)
WBC # BLD AUTO: 5.16 10*3/MM3 (ref 3.4–10.8)

## 2020-10-23 ENCOUNTER — TELEPHONE (OUTPATIENT)
Dept: INTERNAL MEDICINE | Facility: CLINIC | Age: 70
End: 2020-10-23

## 2020-10-23 NOTE — TELEPHONE ENCOUNTER
PATIENT'S WIFE CALLED AND STATED THAT SHE AND THE PATIENT WANT TO GET THEIR IMAGING DONE ONE THE SAME DAY. THE PATIENT IS SCHEDULED FOR A CHEST CT ON 10/26. THEY WERE TOLD THAT THEY BOTH CAN BE SCHEDULED FOR THEIR IMAGING ON 20, BUT THE REFERRAL FOR THE CT SCAN WILL  BEFORE THEN, SO THE REFERRAL WILL NEED TO BE EXTENDED UNTIL THE END OF November.    PATIENT'S WIFE CALLBACK: [phone]

## 2020-10-23 NOTE — TELEPHONE ENCOUNTER
Please send this to referral desk-the referral is good for one year usually, is an extension needed?

## 2020-10-26 ENCOUNTER — APPOINTMENT (OUTPATIENT)
Dept: CT IMAGING | Facility: HOSPITAL | Age: 70
End: 2020-10-26

## 2020-11-16 ENCOUNTER — HOSPITAL ENCOUNTER (OUTPATIENT)
Dept: CT IMAGING | Facility: HOSPITAL | Age: 70
Discharge: HOME | End: 2020-11-16
Admitting: NURSE PRACTITIONER

## 2020-11-16 DIAGNOSIS — F17.211 NICOTINE DEPENDENCE, CIGARETTES, IN REMISSION: ICD-10-CM

## 2020-11-16 DIAGNOSIS — Z12.2 ENCOUNTER FOR SCREENING FOR LUNG CANCER: ICD-10-CM

## 2020-11-16 PROCEDURE — G0297 LDCT FOR LUNG CA SCREEN: HCPCS

## 2021-03-09 DIAGNOSIS — Z23 IMMUNIZATION DUE: ICD-10-CM

## 2021-04-12 ENCOUNTER — OFFICE VISIT (OUTPATIENT)
Dept: INTERNAL MEDICINE | Facility: CLINIC | Age: 71
End: 2021-04-12

## 2021-04-12 VITALS
HEIGHT: 71 IN | SYSTOLIC BLOOD PRESSURE: 118 MMHG | DIASTOLIC BLOOD PRESSURE: 74 MMHG | TEMPERATURE: 97.3 F | BODY MASS INDEX: 31.22 KG/M2 | HEART RATE: 76 BPM | OXYGEN SATURATION: 97 % | WEIGHT: 223 LBS

## 2021-04-12 DIAGNOSIS — I25.10 CORONARY ARTERIOSCLEROSIS IN NATIVE ARTERY: ICD-10-CM

## 2021-04-12 DIAGNOSIS — K21.00 GASTROESOPHAGEAL REFLUX DISEASE WITH ESOPHAGITIS WITHOUT HEMORRHAGE: ICD-10-CM

## 2021-04-12 DIAGNOSIS — E78.00 PURE HYPERCHOLESTEROLEMIA: ICD-10-CM

## 2021-04-12 DIAGNOSIS — I10 ESSENTIAL HYPERTENSION: Primary | ICD-10-CM

## 2021-04-12 LAB
ALBUMIN SERPL-MCNC: 4.2 G/DL (ref 3.5–5.2)
ALBUMIN/GLOB SERPL: 2.2 G/DL
ALP SERPL-CCNC: 97 U/L (ref 39–117)
ALT SERPL-CCNC: 20 U/L (ref 1–41)
AST SERPL-CCNC: 25 U/L (ref 1–40)
BASOPHILS # BLD AUTO: 0.09 10*3/MM3 (ref 0–0.2)
BASOPHILS NFR BLD AUTO: 1.4 % (ref 0–1.5)
BILIRUB SERPL-MCNC: 0.6 MG/DL (ref 0–1.2)
BUN SERPL-MCNC: 13 MG/DL (ref 8–23)
BUN/CREAT SERPL: 14.4 (ref 7–25)
CALCIUM SERPL-MCNC: 9.4 MG/DL (ref 8.6–10.5)
CHLORIDE SERPL-SCNC: 105 MMOL/L (ref 98–107)
CHOLEST SERPL-MCNC: 99 MG/DL (ref 0–200)
CO2 SERPL-SCNC: 28.4 MMOL/L (ref 22–29)
CREAT SERPL-MCNC: 0.9 MG/DL (ref 0.76–1.27)
EOSINOPHIL # BLD AUTO: 0.3 10*3/MM3 (ref 0–0.4)
EOSINOPHIL NFR BLD AUTO: 4.6 % (ref 0.3–6.2)
ERYTHROCYTE [DISTWIDTH] IN BLOOD BY AUTOMATED COUNT: 13.8 % (ref 12.3–15.4)
GLOBULIN SER CALC-MCNC: 1.9 GM/DL
GLUCOSE SERPL-MCNC: 100 MG/DL (ref 65–99)
HCT VFR BLD AUTO: 46.8 % (ref 37.5–51)
HDLC SERPL-MCNC: 28 MG/DL (ref 40–60)
HGB BLD-MCNC: 15.5 G/DL (ref 13–17.7)
IMM GRANULOCYTES # BLD AUTO: 0.02 10*3/MM3 (ref 0–0.05)
IMM GRANULOCYTES NFR BLD AUTO: 0.3 % (ref 0–0.5)
LDLC SERPL CALC-MCNC: 44 MG/DL (ref 0–100)
LYMPHOCYTES # BLD AUTO: 1.36 10*3/MM3 (ref 0.7–3.1)
LYMPHOCYTES NFR BLD AUTO: 21 % (ref 19.6–45.3)
Lab: NORMAL
MCH RBC QN AUTO: 29.9 PG (ref 26.6–33)
MCHC RBC AUTO-ENTMCNC: 33.1 G/DL (ref 31.5–35.7)
MCV RBC AUTO: 90.3 FL (ref 79–97)
MONOCYTES # BLD AUTO: 0.44 10*3/MM3 (ref 0.1–0.9)
MONOCYTES NFR BLD AUTO: 6.8 % (ref 5–12)
NEUTROPHILS # BLD AUTO: 4.26 10*3/MM3 (ref 1.7–7)
NEUTROPHILS NFR BLD AUTO: 65.9 % (ref 42.7–76)
NRBC BLD AUTO-RTO: 0 /100 WBC (ref 0–0.2)
PLATELET # BLD AUTO: 203 10*3/MM3 (ref 140–450)
POTASSIUM SERPL-SCNC: 4.3 MMOL/L (ref 3.5–5.2)
PROT SERPL-MCNC: 6.1 G/DL (ref 6–8.5)
RBC # BLD AUTO: 5.18 10*6/MM3 (ref 4.14–5.8)
SODIUM SERPL-SCNC: 141 MMOL/L (ref 136–145)
TRIGL SERPL-MCNC: 159 MG/DL (ref 0–150)
TSH SERPL DL<=0.005 MIU/L-ACNC: 2.23 UIU/ML (ref 0.27–4.2)
VLDLC SERPL CALC-MCNC: 27 MG/DL (ref 5–40)
WBC # BLD AUTO: 6.47 10*3/MM3 (ref 3.4–10.8)

## 2021-04-12 PROCEDURE — 99214 OFFICE O/P EST MOD 30 MIN: CPT | Performed by: NURSE PRACTITIONER

## 2021-04-12 NOTE — PROGRESS NOTES
"Chief Complaint  Hypertension, Hyperlipidemia, Coronary Artery Disease, and Heartburn    Subjective          Dwayne Keita presents to [facility] for f/u regarding HTN, hyperlipidemia with hx CAD and GERD.    He reports feeling well, working full-time in job where he walks throughout the day which he tolerates well without chest pain. His wife has noticed dyspnea with heavy exertion which he states does not bother him. His CT of chest 11/2020 did show emphysema.  He is followed by cardiology for CAD and hyperlipidemia, recent labs showed elevated glucose of 132 (states he was non-fasting). His cardiologist has kept him on Niaspan, also on statin therapy. LDL was 52 with 11/2020 labs.      Hypertension  This is a chronic problem. The current episode started more than 1 year ago. The problem is unchanged. The problem is controlled. Associated symptoms include shortness of breath (notices dyspnea with heavy exertion). Pertinent negatives include no headaches or peripheral edema. Current antihypertension treatment includes ACE inhibitors and beta blockers. The current treatment provides significant improvement. There are no compliance problems.        Objective   Vital Signs:   /74 (BP Location: Left arm, Patient Position: Sitting, Cuff Size: Adult)   Pulse 76   Temp 97.3 °F (36.3 °C) (Temporal)   Ht 180.3 cm (71\")   Wt 101 kg (223 lb)   SpO2 97%   BMI 31.10 kg/m²     Physical Exam  Constitutional:       Appearance: He is well-developed. He is not ill-appearing.   HENT:      Head: Normocephalic.      Right Ear: Hearing, tympanic membrane and external ear normal.      Left Ear: Hearing, tympanic membrane and external ear normal.      Nose: Nose normal. No nasal deformity, mucosal edema or rhinorrhea.      Right Sinus: No maxillary sinus tenderness or frontal sinus tenderness.      Left Sinus: No maxillary sinus tenderness or frontal sinus tenderness.      Mouth/Throat:      " Dentition: Normal dentition.   Eyes:      General: Lids are normal.         Right eye: No discharge.         Left eye: No discharge.      Conjunctiva/sclera: Conjunctivae normal.      Right eye: No exudate.     Left eye: No exudate.  Neck:      Thyroid: No thyroid mass or thyromegaly.      Vascular: No carotid bruit.      Trachea: Trachea normal.   Cardiovascular:      Rate and Rhythm: Regular rhythm.      Pulses: Normal pulses.      Heart sounds: Normal heart sounds. No murmur heard.     Pulmonary:      Effort: No respiratory distress.      Breath sounds: Normal breath sounds. No decreased breath sounds, wheezing, rhonchi or rales.   Abdominal:      General: Bowel sounds are normal.      Palpations: Abdomen is soft.      Tenderness: There is no abdominal tenderness.   Musculoskeletal:      Cervical back: Normal range of motion. No edema.   Lymphadenopathy:      Head:      Right side of head: No submental, submandibular, tonsillar, preauricular, posterior auricular or occipital adenopathy.      Left side of head: No submental, submandibular, tonsillar, preauricular, posterior auricular or occipital adenopathy.   Skin:     General: Skin is warm and dry.      Nails: There is no clubbing.   Neurological:      Mental Status: He is alert.   Psychiatric:         Behavior: Behavior is cooperative.        Result Review :   The following data was reviewed by: AMBROSIO Chance on 04/12/2021:  Common labs    Common Labsle 10/16/20 10/16/20 10/16/20 10/16/20 4/12/21 4/12/21 4/12/21    1058 1058 1058 1058 0000 0000 0000   Glucose  102 (A)    100 (A)    BUN  16    13    Creatinine  0.85    0.90    eGFR Non  Am  89    83    eGFR  Am  107    100    Sodium  138    141    Potassium  4.7    4.3    Chloride  101    105    Calcium  9.5    9.4    Total Protein  6.6    6.1    Albumin  4.50    4.20    Total Bilirubin  0.6    0.6    Alkaline Phosphatase  99    97    AST (SGOT)  21    25    ALT (SGPT)  21    20    WBC  5.16    6.47     Hemoglobin 15.4    15.5     Hematocrit 45.3    46.8     Platelets 222    203     Total Cholesterol   116    99   Triglycerides   114    159 (A)   HDL Cholesterol   33 (A)    28 (A)   LDL Cholesterol    62    44   PSA    0.513      (A) Abnormal value       Comments are available for some flowsheets but are not being displayed.           Data reviewed: Radiologic studies CT scan of chest          Assessment and Plan    Diagnoses and all orders for this visit:    1. Essential hypertension (Primary)  Assessment & Plan:  Hypertension is unchanged.  Continue current treatment regimen.  Dietary sodium restriction.  Blood pressure will be reassessed at the next regular appointment.    Orders:  -     CBC & Differential  -     Comprehensive Metabolic Panel  -     TSH    2. Pure hypercholesterolemia  Assessment & Plan:  Lipid abnormalities are unchanged.  Pharmacotherapy as ordered.  Lipids will be reassessed in 6 months.    Orders:  -     Lipid Panel    3. Gastroesophageal reflux disease with esophagitis without hemorrhage  Assessment & Plan:  Sx are controlled with OTC Nexium      4. Coronary arteriosclerosis in native artery  Assessment & Plan:  Coronary artery disease is unchanged.  Continue current treatment regimen.  Cardiac status will be reassessed in 6 months.      Other orders  -     Please Note    Reviewed CT scan of chest from 11/2020 and emphysema noted by radiologist which may be source of his dyspnea, he has declined further evaluation for now (states does not bother him) but will consider if sx worsen.      Follow Up   Return in about 6 months (around 10/12/2021).  Patient was given instructions and counseling regarding his condition or for health maintenance advice. Please see specific information pulled into the AVS if appropriate.

## 2021-04-13 PROBLEM — R53.83 FATIGUE: Status: RESOLVED | Noted: 2018-10-10 | Resolved: 2021-04-13

## 2021-04-13 PROBLEM — R06.00 DYSPNEA: Status: RESOLVED | Noted: 2018-10-10 | Resolved: 2021-04-13

## 2021-04-13 PROBLEM — M54.50 ACUTE BILATERAL LOW BACK PAIN WITHOUT SCIATICA: Status: RESOLVED | Noted: 2019-01-31 | Resolved: 2021-04-13

## 2021-04-13 PROBLEM — J43.8 OTHER EMPHYSEMA (HCC): Status: ACTIVE | Noted: 2021-04-13

## 2021-04-13 PROBLEM — J43.8 OTHER EMPHYSEMA (HCC): Chronic | Status: ACTIVE | Noted: 2021-04-13

## 2021-06-11 ENCOUNTER — OFFICE VISIT (OUTPATIENT)
Dept: INTERNAL MEDICINE | Facility: CLINIC | Age: 71
End: 2021-06-11

## 2021-06-11 VITALS
BODY MASS INDEX: 30.8 KG/M2 | HEIGHT: 71 IN | TEMPERATURE: 97.3 F | HEART RATE: 77 BPM | DIASTOLIC BLOOD PRESSURE: 68 MMHG | OXYGEN SATURATION: 98 % | WEIGHT: 220 LBS | SYSTOLIC BLOOD PRESSURE: 110 MMHG

## 2021-06-11 DIAGNOSIS — J34.89 SINUS DRAINAGE: Primary | ICD-10-CM

## 2021-06-11 DIAGNOSIS — F39 EPISODIC MOOD DISORDER (HCC): Chronic | ICD-10-CM

## 2021-06-11 DIAGNOSIS — H61.23 BILATERAL IMPACTED CERUMEN: ICD-10-CM

## 2021-06-11 PROCEDURE — 99213 OFFICE O/P EST LOW 20 MIN: CPT | Performed by: NURSE PRACTITIONER

## 2021-06-11 PROCEDURE — 69209 REMOVE IMPACTED EAR WAX UNI: CPT | Performed by: NURSE PRACTITIONER

## 2021-06-11 RX ORDER — SERTRALINE HYDROCHLORIDE 100 MG/1
50 TABLET, FILM COATED ORAL DAILY
Qty: 45 TABLET | Refills: 1 | Status: SHIPPED | OUTPATIENT
Start: 2021-06-11 | End: 2021-10-15

## 2021-06-11 RX ORDER — LORATADINE 10 MG/1
10 TABLET ORAL DAILY
Qty: 10 TABLET
Start: 2021-06-11 | End: 2022-11-01

## 2021-06-11 NOTE — PROGRESS NOTES
"Chief Complaint  Earache and Sinus Problem    Subjective          Dwayne PATRICA Keita presents to [facility] who presents due to increased sinus pressure with ringing in both ears.    He c/o increased sinus pressure and drainage over the past several weeks which has gradually worsened with associated fatigue. Denies fever or chills. He has a hx of persistent tinnitus in bilateral ears which has worsened over the past several days, especially in left ear.      Objective   Vital Signs:   /68 (BP Location: Left arm, Patient Position: Sitting, Cuff Size: Adult)   Pulse 77   Temp 97.3 °F (36.3 °C) (Temporal)   Ht 180.3 cm (71\")   Wt 99.8 kg (220 lb)   SpO2 98%   BMI 30.68 kg/m²     Physical Exam  Vitals and nursing note reviewed.   Constitutional:       Appearance: He is well-developed. He is not ill-appearing.   HENT:      Head: Normocephalic.      Right Ear: There is impacted cerumen.      Left Ear: There is impacted cerumen.      Nose: Nose normal. No mucosal edema or rhinorrhea.      Right Sinus: No maxillary sinus tenderness or frontal sinus tenderness.      Left Sinus: No maxillary sinus tenderness or frontal sinus tenderness.      Mouth/Throat:      Pharynx: Posterior oropharyngeal erythema present.   Eyes:      General: Lids are normal.         Right eye: No discharge.         Left eye: No discharge.      Conjunctiva/sclera: Conjunctivae normal.      Right eye: No exudate.     Left eye: No exudate.  Neck:      Trachea: Trachea normal.   Cardiovascular:      Rate and Rhythm: Regular rhythm.      Pulses: Normal pulses.      Heart sounds: Normal heart sounds. No murmur heard.     Pulmonary:      Effort: No respiratory distress.      Breath sounds: Normal breath sounds. No decreased breath sounds, wheezing, rhonchi or rales.   Musculoskeletal:      Cervical back: Normal range of motion. Edema present.   Lymphadenopathy:      Cervical: No cervical adenopathy.   Skin:     " General: Skin is warm and dry.   Neurological:      Mental Status: He is alert.   Psychiatric:         Behavior: Behavior is cooperative.        Result Review :          Ear Cerumen Removal    Date/Time: 6/11/2021 11:40 AM  Performed by: Ivelisse Casarez APRN  Authorized by: Ivelisse Casarez APRN     Anesthesia:  Local Anesthetic: proparacaine drops  Location details: left ear and right ear  Patient tolerance: patient tolerated the procedure well with no immediate complications  Procedure type: irrigation   Sedation:  Patient sedated: no              Assessment and Plan    Diagnoses and all orders for this visit:    1. Sinus drainage (Primary)  Comments:  He will start Claritin & Mucinex for increased drainage  Orders:  -     sertraline (ZOLOFT) 100 MG tablet; Take 0.5 tablets by mouth Daily.  Dispense: 45 tablet; Refill: 1  -     guaiFENesin (Mucinex) 600 MG 12 hr tablet; Take 1 tablet by mouth Every 12 (Twelve) Hours for 7 days.  Dispense: 14 tablet    2. Bilateral impacted cerumen  Comments:  Ears irrigated & using curette, excessive cerumen removed. He toelrated procedure well & reported improvement in sx.  Orders:  -     Ear Cerumen Removal    3. Episodic mood disorder (CMS/HCC)  Assessment & Plan:  Sx stable with Sertraline    Orders:  -     loratadine (CLARITIN) 10 MG tablet; Take 1 tablet by mouth Daily for 10 days.  Dispense: 10 tablet      Follow Up   Return if symptoms worsen or fail to improve, for Next scheduled follow up.  Patient was given instructions and counseling regarding his condition or for health maintenance advice. Please see specific information pulled into the AVS if appropriate.

## 2021-08-20 ENCOUNTER — PREP FOR SURGERY (OUTPATIENT)
Dept: OTHER | Facility: HOSPITAL | Age: 71
End: 2021-08-20

## 2021-08-20 ENCOUNTER — TELEPHONE (OUTPATIENT)
Dept: GASTROENTEROLOGY | Facility: CLINIC | Age: 71
End: 2021-08-20

## 2021-08-20 DIAGNOSIS — Z12.11 ENCOUNTER FOR SCREENING FOR MALIGNANT NEOPLASM OF COLON: Primary | ICD-10-CM

## 2021-08-20 NOTE — TELEPHONE ENCOUNTER
----- Message from Yasmin Valadez sent at 8/20/2021  2:10 PM EDT -----  Regarding: schedule colonoscopy  Contact: [phone]  Spoke with pt's wife and she wants to get her  scheduled for a colonoscopy

## 2021-10-13 ENCOUNTER — OFFICE VISIT (OUTPATIENT)
Dept: INTERNAL MEDICINE | Facility: CLINIC | Age: 71
End: 2021-10-13

## 2021-10-13 VITALS
HEART RATE: 70 BPM | WEIGHT: 220 LBS | TEMPERATURE: 97.5 F | DIASTOLIC BLOOD PRESSURE: 70 MMHG | SYSTOLIC BLOOD PRESSURE: 112 MMHG | BODY MASS INDEX: 30.8 KG/M2 | OXYGEN SATURATION: 98 % | HEIGHT: 71 IN

## 2021-10-13 DIAGNOSIS — I25.10 CORONARY ARTERIOSCLEROSIS IN NATIVE ARTERY: Chronic | ICD-10-CM

## 2021-10-13 DIAGNOSIS — F39 EPISODIC MOOD DISORDER (HCC): Chronic | ICD-10-CM

## 2021-10-13 DIAGNOSIS — E78.00 PURE HYPERCHOLESTEROLEMIA: Chronic | ICD-10-CM

## 2021-10-13 DIAGNOSIS — Z23 NEED FOR INFLUENZA VACCINATION: ICD-10-CM

## 2021-10-13 DIAGNOSIS — I10 PRIMARY HYPERTENSION: Primary | Chronic | ICD-10-CM

## 2021-10-13 DIAGNOSIS — Z12.5 SCREENING FOR PROSTATE CANCER: ICD-10-CM

## 2021-10-13 DIAGNOSIS — Z12.11 SCREENING FOR COLON CANCER: ICD-10-CM

## 2021-10-13 PROCEDURE — 1159F MED LIST DOCD IN RCRD: CPT | Performed by: NURSE PRACTITIONER

## 2021-10-13 PROCEDURE — 90662 IIV NO PRSV INCREASED AG IM: CPT | Performed by: NURSE PRACTITIONER

## 2021-10-13 PROCEDURE — G0008 ADMIN INFLUENZA VIRUS VAC: HCPCS | Performed by: NURSE PRACTITIONER

## 2021-10-13 PROCEDURE — 96160 PT-FOCUSED HLTH RISK ASSMT: CPT | Performed by: NURSE PRACTITIONER

## 2021-10-13 PROCEDURE — 99214 OFFICE O/P EST MOD 30 MIN: CPT | Performed by: NURSE PRACTITIONER

## 2021-10-13 PROCEDURE — 1125F AMNT PAIN NOTED PAIN PRSNT: CPT | Performed by: NURSE PRACTITIONER

## 2021-10-13 PROCEDURE — 1170F FXNL STATUS ASSESSED: CPT | Performed by: NURSE PRACTITIONER

## 2021-10-13 PROCEDURE — G0439 PPPS, SUBSEQ VISIT: HCPCS | Performed by: NURSE PRACTITIONER

## 2021-10-13 NOTE — ASSESSMENT & PLAN NOTE
Hx stent placement 2017 and CABG. His heart cath 12/2020 showed nonobstructive CAD.  He denies chest pain or palpitations, does c/o intermittent shortness of breath with heavy exertion which has been recurrent.

## 2021-10-13 NOTE — PROGRESS NOTES
"Chief Complaint  Medicare Wellness-subsequent, Hypertension, Heartburn, and Depression    Subjective          Dwayne Keita presents to [facility] for f/u regarding HTN, GERD and depression.    He reports feeling well, continues to work full-time at [employer]. He also stays active at home, reports diffuse joint pain and stiffness (especially right hand) which is tolerable. Denies chest pain, continues to intermittent dyspnea which is chronic.      Objective   Vital Signs:   /70 (BP Location: Left arm, Patient Position: Sitting, Cuff Size: Adult)   Pulse 70   Temp 97.5 °F (36.4 °C) (Temporal)   Ht 180.3 cm (71\")   Wt 99.8 kg (220 lb)   SpO2 98%   BMI 30.68 kg/m²     Physical Exam  Constitutional:       Appearance: He is well-developed. He is not ill-appearing.   HENT:      Head: Normocephalic.      Right Ear: Hearing, tympanic membrane and external ear normal.      Left Ear: Hearing, tympanic membrane and external ear normal.      Nose: Nose normal. No nasal deformity, mucosal edema or rhinorrhea.      Right Sinus: No maxillary sinus tenderness or frontal sinus tenderness.      Left Sinus: No maxillary sinus tenderness or frontal sinus tenderness.      Mouth/Throat:      Dentition: Normal dentition.   Eyes:      General: Lids are normal.         Right eye: No discharge.         Left eye: No discharge.      Conjunctiva/sclera: Conjunctivae normal.      Right eye: No exudate.     Left eye: No exudate.  Neck:      Thyroid: No thyroid mass or thyromegaly.      Vascular: No carotid bruit.      Trachea: Trachea normal.   Cardiovascular:      Rate and Rhythm: Regular rhythm.      Pulses: Normal pulses.      Heart sounds: Normal heart sounds. No murmur heard.      Pulmonary:      Effort: No respiratory distress.      Breath sounds: Normal breath sounds. No decreased breath sounds, wheezing, rhonchi or rales.   Abdominal:      General: Bowel sounds are normal.      " Palpations: Abdomen is soft.      Tenderness: There is no abdominal tenderness.   Musculoskeletal:      Cervical back: Normal range of motion. No edema.   Lymphadenopathy:      Head:      Right side of head: No submental, submandibular, tonsillar, preauricular, posterior auricular or occipital adenopathy.      Left side of head: No submental, submandibular, tonsillar, preauricular, posterior auricular or occipital adenopathy.   Skin:     General: Skin is warm and dry.      Nails: There is no clubbing.   Neurological:      Mental Status: He is alert.   Psychiatric:         Behavior: Behavior is cooperative.        Result Review :   The following data was reviewed by: AMBROSIO Chance on 10/13/2021:  Common labs    Common Labsle 4/12/21 4/12/21 4/12/21    0000 0000 0000   Glucose  100 (A)    BUN  13    Creatinine  0.90    eGFR Non  Am  83    eGFR African Am  100    Sodium  141    Potassium  4.3    Chloride  105    Calcium  9.4    Total Protein  6.1    Albumin  4.20    Total Bilirubin  0.6    Alkaline Phosphatase  97    AST (SGOT)  25    ALT (SGPT)  20    WBC 6.47     Hemoglobin 15.5     Hematocrit 46.8     Platelets 203     Total Cholesterol   99   Triglycerides   159 (A)   HDL Cholesterol   28 (A)   LDL Cholesterol    44   (A) Abnormal value       Comments are available for some flowsheets but are not being displayed.           Data reviewed: Consultant notes cardiology 5/2021          Assessment and Plan    Diagnoses and all orders for this visit:    1. Primary hypertension (Primary)  Assessment & Plan:  His BP is well-controlled with Lisinopril and Metoprolol which he will continue along with a low sodium diet.    Orders:  -     CBC & Differential  -     Comprehensive Metabolic Panel  -     TSH    2. Pure hypercholesterolemia  Assessment & Plan:  He is tolerating atorvastatin without myalgias, check fasting labs (has been managed on Niaspan by cardiology).    Orders:  -     Lipid Panel    3. Coronary  arteriosclerosis in native artery  Assessment & Plan:  Hx stent placement 2017 and CABG. His heart cath 12/2020 showed nonobstructive CAD.  He denies chest pain or palpitations, does c/o intermittent shortness of breath with heavy exertion which has been recurrent.      4. Episodic mood disorder (HCC)  Assessment & Plan:  He has been managed on Sertraline which he is tolerating well and states has been stable.      5. Screening for prostate cancer  -     PSA Screen    6. Need for influenza vaccination  -     Fluzone High-Dose 65+yrs ([id number])    He is due for a screening colonoscopy which was cancelled 9/30/21 due to conflict with his schedule. He has tried to reschedule colonoscopy but has had difficulty, has requested different GI due to difficulty with scheduling (will notify mgmt).       Follow Up   Return in about 6 months (around 4/13/2022).  Patient was given instructions and counseling regarding his condition or for health maintenance advice. Please see specific information pulled into the AVS if appropriate.

## 2021-10-13 NOTE — PROGRESS NOTES
The ABCs of the Annual Wellness Visit  Subsequent Medicare Wellness Visit    Chief Complaint   Patient presents with   • Medicare Wellness-subsequent   • Hypertension   • Heartburn   • Depression      Subjective    History of Present Illness:  Dwayne Keita is a 71 y.o. male who presents for a Subsequent Medicare Wellness Visit.    The following portions of the patient's history were reviewed and   updated as appropriate: allergies, current medications, past family history, past medical history, past social history, past surgical history and problem list.    Compared to one year ago, the patient feels his physical   health is the same.    Compared to one year ago, the patient feels his mental   health is the same.    Recent Hospitalizations:  He was not admitted to the hospital during the last year.       Current Medical Providers:  Patient Care Team:  Ivelisse Casarez APRN as PCP - General (Internal Medicine)    Outpatient Medications Prior to Visit   Medication Sig Dispense Refill   • aspirin 81 MG tablet Take 81 mg by mouth Daily.     • atorvastatin (LIPITOR) 80 MG tablet Take 80 mg by mouth Daily.     • cholecalciferol (VITAMIN D3) 10 MCG (400 UNIT) tablet Take 400 Units by mouth Daily.     • diphenhydrAMINE-acetaminophen (TYLENOL PM)  MG tablet per tablet Take 1 tablet by mouth At Night As Needed for Sleep.     • isosorbide mononitrate (IMDUR) 30 MG 24 hr tablet Take 30 mg by mouth Daily. Dr Wade     • lisinopril (PRINIVIL,ZESTRIL) 10 MG tablet      • meclizine (ANTIVERT) 12.5 MG tablet Take 1 tablet by mouth 3 (Three) Times a Day As Needed for Dizziness. 30 tablet 0   • metoprolol succinate XL (TOPROL-XL) 50 MG 24 hr tablet Take 50 mg by mouth Daily. 1/2 TABLET DAILY     • NEXIUM 24HR 20 MG tablet delayed-release      • niacin (NIASPAN) 500 MG CR tablet Take 500 mg by mouth Every Night.     • Omega-3 Fatty Acids (FISH OIL PO) Take 1 capsule by mouth Daily.     • sertraline (ZOLOFT) 100 MG tablet  "Take 0.5 tablets by mouth Daily. 45 tablet 1   • loratadine (CLARITIN) 10 MG tablet Take 1 tablet by mouth Daily for 10 days. 10 tablet      No facility-administered medications prior to visit.       No opioid medication identified on active medication list. I have reviewed chart for other potential  high risk medication/s and harmful drug interactions in the elderly.          Aspirin is on active medication list. Aspirin use is indicated based on review of current medical condition/s. Pros and cons of this therapy have been discussed today. Benefits of this medication outweigh potential harm.  Patient has been encouraged to continue taking this medication.  .      Patient Active Problem List   Diagnosis   • Benign positional vertigo   • Hypertension   • Hyperlipidemia   • Coronary arteriosclerosis in native artery   • GERD (gastroesophageal reflux disease)   • Episodic mood disorder (HCC)   • Allergic rhinitis   • Other emphysema (HCC)     Advance Care Planning  Advance Directive is on file.  ACP discussion was held with the patient during this visit. Patient has an advance directive in EMR which is still valid.           Objective    Vitals:    10/13/21 0831   BP: 112/70   BP Location: Left arm   Patient Position: Sitting   Cuff Size: Adult   Pulse: 70   Temp: 97.5 °F (36.4 °C)   TempSrc: Temporal   SpO2: 98%   Weight: 99.8 kg (220 lb)   Height: 180.3 cm (71\")   PainSc:   6   PainLoc: Generalized     BMI Readings from Last 1 Encounters:   10/13/21 30.68 kg/m²   BMI is above normal parameters. Recommendations include: nutrition counseling    Does the patient have evidence of cognitive impairment? No    Physical Exam            HEALTH RISK ASSESSMENT    Smoking Status:  Social History     Tobacco Use   Smoking Status Former Smoker   • Packs/day: 1.50   • Years: 45.00   • Pack years: 67.50   • Types: Cigarettes   • Quit date:    • Years since quittin.7   Smokeless Tobacco Never Used     Alcohol " Consumption:  Social History     Substance and Sexual Activity   Alcohol Use No     Fall Risk Screen:    LINDAADI Fall Risk Assessment was completed, and patient is at LOW risk for falls.Assessment completed on:10/13/2021    Depression Screening:  PHQ-2/PHQ-9 Depression Screening 10/13/2021   Little interest or pleasure in doing things 0   Feeling down, depressed, or hopeless 0   Trouble falling or staying asleep, or sleeping too much 3   Feeling tired or having little energy 0   Poor appetite or overeating 0   Feeling bad about yourself - or that you are a failure or have let yourself or your family down 0   Trouble concentrating on things, such as reading the newspaper or watching television 0   Moving or speaking so slowly that other people could have noticed. Or the opposite - being so fidgety or restless that you have been moving around a lot more than usual 0   Thoughts that you would be better off dead, or of hurting yourself in some way 0   Total Score 3   If you checked off any problems, how difficult have these problems made it for you to do your work, take care of things at home, or get along with other people? Not difficult at all       Health Habits and Functional and Cognitive Screening:  Functional & Cognitive Status 10/13/2021   Do you have difficulty preparing food and eating? No   Do you have difficulty bathing yourself, getting dressed or grooming yourself? No   Do you have difficulty using the toilet? No   Do you have difficulty moving around from place to place? No   Do you have trouble with steps or getting out of a bed or a chair? No   Current Diet Unhealthy Diet   Dental Exam Up to date   Eye Exam Up to date   Exercise (times per week) 7 times per week   Current Exercises Include Walking   Current Exercise Activities Include -   Do you need help using the phone?  No   Are you deaf or do you have serious difficulty hearing?  Yes   Do you need help with transportation? No   Do you need help  shopping? No   Do you need help preparing meals?  No   Do you need help with housework?  No   Do you need help with laundry? No   Do you need help taking your medications? No   Do you need help managing money? No   Do you ever drive or ride in a car without wearing a seat belt? No   Have you felt unusual stress, anger or loneliness in the last month? No   Who do you live with? Spouse   If you need help, do you have trouble finding someone available to you? No   Have you been bothered in the last four weeks by sexual problems? No   Do you have difficulty concentrating, remembering or making decisions? No       Age-appropriate Screening Schedule:  Refer to the list below for future screening recommendations based on patient's age, sex and/or medical conditions. Orders for these recommended tests are listed in the plan section. The patient has been provided with a written plan.    Health Maintenance   Topic Date Due   • ZOSTER VACCINE (1 of 2) Never done   • TDAP/TD VACCINES (2 - Tdap) 07/17/2029 (Originally 2/3/2029)   • LIPID PANEL  05/20/2022   • INFLUENZA VACCINE  Completed              Assessment/Plan   CMS Preventative Services Quick Reference  Risk Factors Identified During Encounter  Cardiovascular Disease  Immunizations Discussed/Encouraged (specific Immunizations; Shingrix  The above risks/problems have been discussed with the patient.  Follow up actions/plans if indicated are seen below in the Assessment/Plan Section.  Pertinent information has been shared with the patient in the After Visit Summary.    Diagnoses and all orders for this visit:    1. Primary hypertension (Primary)  Assessment & Plan:  His BP is well-controlled with Lisinopril and Metoprolol which he will continue along with a low sodium diet.    Orders:  -     CBC & Differential  -     Comprehensive Metabolic Panel  -     TSH    2. Pure hypercholesterolemia  Assessment & Plan:  He is tolerating atorvastatin without myalgias, check fasting  labs (has been managed on Niaspan by cardiology).    Orders:  -     Lipid Panel    3. Coronary arteriosclerosis in native artery  Assessment & Plan:  Hx stent placement 2017 and CABG. His heart cath 12/2020 showed nonobstructive CAD.  He denies chest pain or palpitations, does c/o intermittent shortness of breath with heavy exertion which has been recurrent.      4. Episodic mood disorder (HCC)  Assessment & Plan:  He has been managed on Sertraline which he is tolerating well and states has been stable.      5. Screening for prostate cancer  -     PSA Screen    6. Need for influenza vaccination  -     Fluzone High-Dose 65+yrs ([id number])      Follow Up:   Return in about 6 months (around 4/13/2022).     An After Visit Summary and PPPS were made available to the patient.

## 2021-10-14 NOTE — ASSESSMENT & PLAN NOTE
He is tolerating atorvastatin without myalgias, check fasting labs (has been managed on Niaspan by cardiology).

## 2021-10-14 NOTE — PATIENT INSTRUCTIONS
Medicare Wellness  Personal Prevention Plan of Service     Date of Office Visit:  10/13/2021  Encounter Provider:  AMBROSIO Chance  Place of Service:  [facility]  Patient Name: Dwayne Keita  :  1950    As part of the Medicare Wellness portion of your visit today, we are providing you with this personalized preventive plan of services (PPPS). This plan is based upon recommendations of the [place] Preventive Services Task Force (USPSTF) and the Advisory Committee on Immunization Practices (ACIP).    This lists the preventive care services that should be considered, and provides dates of when you are due. Items listed as completed are up-to-date and do not require any further intervention.    Health Maintenance   Topic Date Due   • ZOSTER VACCINE (1 of 2) Never done   • ANNUAL WELLNESS VISIT  10/08/2021   • TDAP/TD VACCINES (2 - Tdap) 2029 (Originally 2/3/2029)   • LUNG CANCER SCREENING  2021   • LIPID PANEL  2022   • COLORECTAL CANCER SCREENING  2026   • HEPATITIS C SCREENING  Completed   • COVID-19 Vaccine  Completed   • INFLUENZA VACCINE  Completed   • Pneumococcal Vaccine 65+  Completed   • AAA SCREEN (ONE-TIME)  Completed       Orders Placed This Encounter   Procedures   • Fluzone High-Dose 65+yrs ([id number])   • Comprehensive Metabolic Panel     Order Specific Question:   Release to patient     Answer:   Immediate   • Lipid Panel   • TSH     Order Specific Question:   Release to patient     Answer:   Immediate   • PSA Screen     Scheduling Instructions:      Medicare     Order Specific Question:   Release to patient     Answer:   Immediate   • CBC & Differential     Order Specific Question:   Manual Differential     Answer:   No       Return in about 6 months (around 2022).

## 2021-10-14 NOTE — ASSESSMENT & PLAN NOTE
His BP is well-controlled with Lisinopril and Metoprolol which he will continue along with a low sodium diet.

## 2021-10-15 ENCOUNTER — TELEPHONE (OUTPATIENT)
Dept: GASTROENTEROLOGY | Facility: CLINIC | Age: 71
End: 2021-10-15

## 2021-10-15 DIAGNOSIS — J34.89 SINUS DRAINAGE: ICD-10-CM

## 2021-10-15 PROBLEM — Z12.11 ENCOUNTER FOR SCREENING FOR MALIGNANT NEOPLASM OF COLON: Status: ACTIVE | Noted: 2021-08-20

## 2021-10-15 RX ORDER — SERTRALINE HYDROCHLORIDE 100 MG/1
TABLET, FILM COATED ORAL
Qty: 45 TABLET | Refills: 1 | Status: SHIPPED | OUTPATIENT
Start: 2021-10-15 | End: 2022-03-16

## 2021-10-15 NOTE — TELEPHONE ENCOUNTER
[facility] for colonoscopy on 01/14/2022  arrive at 7am   . Mailed Prep instructions to Mailing address on-file. ----miralax

## 2021-10-20 LAB
ALBUMIN SERPL-MCNC: 4.2 G/DL (ref 3.7–4.7)
ALBUMIN/GLOB SERPL: 1.8 {RATIO} (ref 1.2–2.2)
ALP SERPL-CCNC: 101 IU/L (ref 44–121)
ALT SERPL-CCNC: 20 IU/L (ref 0–44)
AST SERPL-CCNC: 27 IU/L (ref 0–40)
BASOPHILS # BLD AUTO: 0.1 X10E3/UL (ref 0–0.2)
BASOPHILS NFR BLD AUTO: 2 %
BILIRUB SERPL-MCNC: 0.6 MG/DL (ref 0–1.2)
BUN SERPL-MCNC: 12 MG/DL (ref 8–27)
BUN/CREAT SERPL: 12 (ref 10–24)
CALCIUM SERPL-MCNC: 9.6 MG/DL (ref 8.6–10.2)
CHLORIDE SERPL-SCNC: 103 MMOL/L (ref 96–106)
CHOLEST SERPL-MCNC: 124 MG/DL (ref 100–199)
CO2 SERPL-SCNC: 27 MMOL/L (ref 20–29)
CREAT SERPL-MCNC: 1.01 MG/DL (ref 0.76–1.27)
EOSINOPHIL # BLD AUTO: 0.3 X10E3/UL (ref 0–0.4)
EOSINOPHIL NFR BLD AUTO: 5 %
ERYTHROCYTE [DISTWIDTH] IN BLOOD BY AUTOMATED COUNT: 13.6 % (ref 11.6–15.4)
GLOBULIN SER CALC-MCNC: 2.4 G/DL (ref 1.5–4.5)
GLUCOSE SERPL-MCNC: 102 MG/DL (ref 65–99)
HCT VFR BLD AUTO: 45.5 % (ref 37.5–51)
HDLC SERPL-MCNC: 29 MG/DL
HGB BLD-MCNC: 15.1 G/DL (ref 13–17.7)
IMM GRANULOCYTES # BLD AUTO: 0 X10E3/UL (ref 0–0.1)
IMM GRANULOCYTES NFR BLD AUTO: 0 %
LDLC SERPL CALC-MCNC: 66 MG/DL (ref 0–99)
LYMPHOCYTES # BLD AUTO: 1.4 X10E3/UL (ref 0.7–3.1)
LYMPHOCYTES NFR BLD AUTO: 26 %
MCH RBC QN AUTO: 28.9 PG (ref 26.6–33)
MCHC RBC AUTO-ENTMCNC: 33.2 G/DL (ref 31.5–35.7)
MCV RBC AUTO: 87 FL (ref 79–97)
MONOCYTES # BLD AUTO: 0.4 X10E3/UL (ref 0.1–0.9)
MONOCYTES NFR BLD AUTO: 7 %
NEUTROPHILS # BLD AUTO: 3.3 X10E3/UL (ref 1.4–7)
NEUTROPHILS NFR BLD AUTO: 60 %
PLATELET # BLD AUTO: 215 X10E3/UL (ref 150–450)
POTASSIUM SERPL-SCNC: 4.4 MMOL/L (ref 3.5–5.2)
PROT SERPL-MCNC: 6.6 G/DL (ref 6–8.5)
PSA SERPL-MCNC: 0.7 NG/ML (ref 0–4)
RBC # BLD AUTO: 5.23 X10E6/UL (ref 4.14–5.8)
SODIUM SERPL-SCNC: 143 MMOL/L (ref 134–144)
TRIGL SERPL-MCNC: 171 MG/DL (ref 0–149)
TSH SERPL DL<=0.005 MIU/L-ACNC: 1.65 UIU/ML (ref 0.45–4.5)
VLDLC SERPL CALC-MCNC: 29 MG/DL (ref 5–40)
WBC # BLD AUTO: 5.5 X10E3/UL (ref 3.4–10.8)

## 2022-01-04 ENCOUNTER — TELEPHONE (OUTPATIENT)
Dept: GASTROENTEROLOGY | Facility: CLINIC | Age: 72
End: 2022-01-04

## 2022-01-04 NOTE — TELEPHONE ENCOUNTER
Pt's wife is calling to cancel her husbands colonoscopy, if you could give her a call please and thank you. [phone] or [phone] contact his wife [phone]

## 2022-01-05 NOTE — TELEPHONE ENCOUNTER
Returned PT phone call, PT  No longer wants to reschedule and wants to keep date of 01/14/2022 @ 7am

## 2022-03-16 DIAGNOSIS — J34.89 SINUS DRAINAGE: ICD-10-CM

## 2022-03-16 RX ORDER — SERTRALINE HYDROCHLORIDE 100 MG/1
TABLET, FILM COATED ORAL
Qty: 45 TABLET | Refills: 1 | Status: SHIPPED | OUTPATIENT
Start: 2022-03-16 | End: 2022-08-11

## 2022-04-25 ENCOUNTER — OFFICE VISIT (OUTPATIENT)
Dept: INTERNAL MEDICINE | Facility: CLINIC | Age: 72
End: 2022-04-25

## 2022-04-25 VITALS
WEIGHT: 219 LBS | BODY MASS INDEX: 30.66 KG/M2 | TEMPERATURE: 98 F | HEART RATE: 70 BPM | SYSTOLIC BLOOD PRESSURE: 128 MMHG | DIASTOLIC BLOOD PRESSURE: 74 MMHG | HEIGHT: 71 IN | OXYGEN SATURATION: 93 %

## 2022-04-25 DIAGNOSIS — J30.9 ALLERGIC RHINITIS, UNSPECIFIED SEASONALITY, UNSPECIFIED TRIGGER: Chronic | ICD-10-CM

## 2022-04-25 DIAGNOSIS — I10 PRIMARY HYPERTENSION: Primary | Chronic | ICD-10-CM

## 2022-04-25 DIAGNOSIS — E78.00 PURE HYPERCHOLESTEROLEMIA: Chronic | ICD-10-CM

## 2022-04-25 DIAGNOSIS — I25.10 CORONARY ARTERIOSCLEROSIS IN NATIVE ARTERY: Chronic | ICD-10-CM

## 2022-04-25 PROCEDURE — 99214 OFFICE O/P EST MOD 30 MIN: CPT | Performed by: NURSE PRACTITIONER

## 2022-04-25 NOTE — PROGRESS NOTES
"Chief Complaint  Hypertension (6 month follow up), Coronary Artery Disease, and Hyperlipidemia    Subjective          Dwayne Keita presents to [facility] for f/u regarding HTN, CAD and hypercholesterolemia.    He is followed by [facility] due to history of CAD, due for annual follow-up in May.  He has been doing well without any exertional chest pain.  Denies palpitations.  He walks approximately 12,000 steps per day at the [employer].  He does note mild dyspnea on exertion which has been chronic and attributed to COPD in the past, he denies any change in the symptoms.    Objective   Vital Signs:   /74 (BP Location: Right arm, Patient Position: Sitting, Cuff Size: Adult)   Pulse 70   Temp 98 °F (36.7 °C) (Axillary)   Ht 180.3 cm (70.98\")   Wt 99.3 kg (219 lb)   SpO2 93%   BMI 30.56 kg/m²     BMI has not been calculated during today's encounter.       Physical Exam  Constitutional:       Appearance: He is well-developed. He is not ill-appearing.   HENT:      Head: Normocephalic.      Right Ear: Hearing, tympanic membrane and external ear normal.      Left Ear: Hearing, tympanic membrane and external ear normal.      Nose: Nose normal. No nasal deformity, mucosal edema or rhinorrhea.      Right Sinus: No maxillary sinus tenderness or frontal sinus tenderness.      Left Sinus: No maxillary sinus tenderness or frontal sinus tenderness.      Mouth/Throat:      Dentition: Normal dentition.   Eyes:      General: Lids are normal.         Right eye: No discharge.         Left eye: No discharge.      Conjunctiva/sclera: Conjunctivae normal.      Right eye: No exudate.     Left eye: No exudate.  Neck:      Thyroid: No thyroid mass or thyromegaly.      Vascular: No carotid bruit.      Trachea: Trachea normal.   Cardiovascular:      Rate and Rhythm: Regular rhythm.      Pulses: Normal pulses.      Heart sounds: Normal heart sounds. No murmur heard.  Pulmonary:    "   Effort: No respiratory distress.      Breath sounds: Normal breath sounds. No decreased breath sounds, wheezing, rhonchi or rales.   Abdominal:      General: Bowel sounds are normal.      Palpations: Abdomen is soft.      Tenderness: There is no abdominal tenderness.   Musculoskeletal:      Cervical back: Normal range of motion. No edema.   Lymphadenopathy:      Head:      Right side of head: No submental, submandibular, tonsillar, preauricular, posterior auricular or occipital adenopathy.      Left side of head: No submental, submandibular, tonsillar, preauricular, posterior auricular or occipital adenopathy.   Skin:     General: Skin is warm and dry.      Nails: There is no clubbing.   Neurological:      Mental Status: He is alert.   Psychiatric:         Behavior: Behavior is cooperative.        Result Review :   The following data was reviewed by: AMBROSIO Chance on 04/25/2022:  Common labs    Common Labsle 10/19/21 10/19/21 10/19/21 10/19/21 4/25/22 4/25/22 4/25/22    0913 0913 0913 0913 0922 0922 0922   Glucose  102 (A)    110 (A)    BUN  12    15    Creatinine  1.01    0.89    eGFR Non  Am  73        eGFR African Am  85        Sodium  143    140    Potassium  4.4    4.3    Chloride  103    106    Calcium  9.6    9.4    Total Protein  6.6    6.5    Albumin  4.2    4.3    Total Bilirubin  0.6    0.6    Alkaline Phosphatase  101    84    AST (SGOT)  27    29    ALT (SGPT)  20    25    WBC 5.5    6.0     Hemoglobin 15.1    15.3     Hematocrit 45.5    44.9     Platelets 215    215     Total Cholesterol   124    113   Triglycerides   171 (A)    105   HDL Cholesterol   29 (A)    28 (A)   LDL Cholesterol    66    65   PSA    0.7      (A) Abnormal value       Comments are available for some flowsheets but are not being displayed.           Data reviewed: Consultant notes cardiology 5/2021          Assessment and Plan    Diagnoses and all orders for this visit:    1. Primary hypertension  (Primary)  Assessment & Plan:  Blood pressure is well controlled on metoprolol and lisinopril which he will continue along with a low-sodium diet.    Orders:  -     CBC & Differential  -     Comprehensive Metabolic Panel    2. Pure hypercholesterolemia  Assessment & Plan:  He is followed by [facility] and is currently managed on niacin and atorvastatin which he tolerates without myalgias.  Continue medications along with a low-fat, low-cholesterol diet.    Orders:  -     Lipid Panel    3. Coronary arteriosclerosis in native artery  Assessment & Plan:  He is due for his annual follow-up with cardiology in May.  He has a history of CABG x1 in 2017 as well as stent placement.  His heart cath in 2020 showed nonobstructive CAD.  He denies chest pain or palpitations, walks approximately 12,000 steps per day.      4. Allergic rhinitis, unspecified seasonality, unspecified trigger  Assessment & Plan:  He does note increased postnasal drainage and congestion over the past month, improved with daily Claritin.    He is overdue for his screening colonoscopy which she has had difficulty scheduling through GI.  He had to cancel his colonoscopy in January due to being diagnosed with COVID and will reschedule through their office.      Follow Up   No follow-ups on file.  Patient was given instructions and counseling regarding his condition or for health maintenance advice. Please see specific information pulled into the AVS if appropriate.

## 2022-04-26 LAB
ALBUMIN SERPL-MCNC: 4.3 G/DL (ref 3.7–4.7)
ALBUMIN/GLOB SERPL: 2 {RATIO} (ref 1.2–2.2)
ALP SERPL-CCNC: 84 IU/L (ref 44–121)
ALT SERPL-CCNC: 25 IU/L (ref 0–44)
AST SERPL-CCNC: 29 IU/L (ref 0–40)
BASOPHILS # BLD AUTO: 0.1 X10E3/UL (ref 0–0.2)
BASOPHILS NFR BLD AUTO: 2 %
BILIRUB SERPL-MCNC: 0.6 MG/DL (ref 0–1.2)
BUN SERPL-MCNC: 15 MG/DL (ref 8–27)
BUN/CREAT SERPL: 17 (ref 10–24)
CALCIUM SERPL-MCNC: 9.4 MG/DL (ref 8.6–10.2)
CHLORIDE SERPL-SCNC: 106 MMOL/L (ref 96–106)
CHOLEST SERPL-MCNC: 113 MG/DL (ref 100–199)
CO2 SERPL-SCNC: 22 MMOL/L (ref 20–29)
CREAT SERPL-MCNC: 0.89 MG/DL (ref 0.76–1.27)
EGFRCR SERPLBLD CKD-EPI 2021: 90 ML/MIN/1.73
EOSINOPHIL # BLD AUTO: 0.3 X10E3/UL (ref 0–0.4)
EOSINOPHIL NFR BLD AUTO: 6 %
ERYTHROCYTE [DISTWIDTH] IN BLOOD BY AUTOMATED COUNT: 13.6 % (ref 11.6–15.4)
GLOBULIN SER CALC-MCNC: 2.2 G/DL (ref 1.5–4.5)
GLUCOSE SERPL-MCNC: 110 MG/DL (ref 65–99)
HCT VFR BLD AUTO: 44.9 % (ref 37.5–51)
HDLC SERPL-MCNC: 28 MG/DL
HGB BLD-MCNC: 15.3 G/DL (ref 13–17.7)
IMM GRANULOCYTES # BLD AUTO: 0 X10E3/UL (ref 0–0.1)
IMM GRANULOCYTES NFR BLD AUTO: 0 %
LDLC SERPL CALC-MCNC: 65 MG/DL (ref 0–99)
LYMPHOCYTES # BLD AUTO: 1.3 X10E3/UL (ref 0.7–3.1)
LYMPHOCYTES NFR BLD AUTO: 22 %
MCH RBC QN AUTO: 30.3 PG (ref 26.6–33)
MCHC RBC AUTO-ENTMCNC: 34.1 G/DL (ref 31.5–35.7)
MCV RBC AUTO: 89 FL (ref 79–97)
MONOCYTES # BLD AUTO: 0.5 X10E3/UL (ref 0.1–0.9)
MONOCYTES NFR BLD AUTO: 8 %
NEUTROPHILS # BLD AUTO: 3.7 X10E3/UL (ref 1.4–7)
NEUTROPHILS NFR BLD AUTO: 62 %
PLATELET # BLD AUTO: 215 X10E3/UL (ref 150–450)
POTASSIUM SERPL-SCNC: 4.3 MMOL/L (ref 3.5–5.2)
PROT SERPL-MCNC: 6.5 G/DL (ref 6–8.5)
RBC # BLD AUTO: 5.05 X10E6/UL (ref 4.14–5.8)
SODIUM SERPL-SCNC: 140 MMOL/L (ref 134–144)
TRIGL SERPL-MCNC: 105 MG/DL (ref 0–149)
VLDLC SERPL CALC-MCNC: 20 MG/DL (ref 5–40)
WBC # BLD AUTO: 6 X10E3/UL (ref 3.4–10.8)

## 2022-04-27 NOTE — ASSESSMENT & PLAN NOTE
He does note increased postnasal drainage and congestion over the past month, improved with daily Claritin.

## 2022-04-27 NOTE — ASSESSMENT & PLAN NOTE
Blood pressure is well controlled on metoprolol and lisinopril which he will continue along with a low-sodium diet.

## 2022-04-27 NOTE — ASSESSMENT & PLAN NOTE
He is due for his annual follow-up with cardiology in May.  He has a history of CABG x1 in 2017 as well as stent placement.  His heart cath in 2020 showed nonobstructive CAD.  He denies chest pain or palpitations, walks approximately 12,000 steps per day.

## 2022-04-27 NOTE — ASSESSMENT & PLAN NOTE
He is followed by [facility] and is currently managed on niacin and atorvastatin which he tolerates without myalgias.  Continue medications along with a low-fat, low-cholesterol diet.

## 2022-04-28 NOTE — PROGRESS NOTES
Dwayne, your recent labs show normal hemoglobin and hematocrit without anemia.  Kidney and liver function as well as electrolytes are normal.  Glucose is mildly elevated but not within the diabetic range-continue a low-carb, low sugar diet.  Cholesterol is good other than mildly decreased HDL-continue atorvastatin along with a low-fat, low-cholesterol diet.  Please continue your current medications and I will see you again in November.

## 2022-05-12 ENCOUNTER — TELEPHONE (OUTPATIENT)
Dept: INTERNAL MEDICINE | Facility: CLINIC | Age: 72
End: 2022-05-12

## 2022-05-12 NOTE — TELEPHONE ENCOUNTER
Caller: Carlota Cabrera    Relationship: Emergency Contact    Best call back number: [phone]    What orders are you requesting (i.e. lab or imaging): CT SCAN OF CHEST AND COLONOSCOPY    In what timeframe would the patient need to come in: WHEN THEY ARE ORDERED    Where will you receive your lab/imaging services: WHERE EVER [facility] SUGGESTS    Additional notes: PAT PATIENTS WIFE CALLED STATED THEY HAD NOT HEARD ANYTHING FROM ANYONE ABOUT THE CT SCAN AND THE COLONOSCOPY.  PATIENTS AND WIFE WERE JUST WANTING TO FIND OUT WHEN [facility] WANTED THESE ITEMS TO BE DONE.

## 2022-05-13 DIAGNOSIS — Z87.891 PERSONAL HISTORY OF NICOTINE DEPENDENCE: ICD-10-CM

## 2022-05-13 DIAGNOSIS — Z12.2 SCREENING FOR LUNG CANCER: Primary | ICD-10-CM

## 2022-05-16 NOTE — TELEPHONE ENCOUNTER
Discussed with patient-his CT scan for lung cancer screening has been ordered. He is waiting to hear from GI to reschedule screening colonoscopy, has had difficulty reaching GI office. He will wait for their return call but notify our office if he has not heard from them.

## 2022-05-23 ENCOUNTER — PRE-PROCEDURE SCREENING (OUTPATIENT)
Dept: GASTROENTEROLOGY | Facility: CLINIC | Age: 72
End: 2022-05-23

## 2022-05-23 ENCOUNTER — PREP FOR SURGERY (OUTPATIENT)
Dept: OTHER | Facility: HOSPITAL | Age: 72
End: 2022-05-23

## 2022-05-23 DIAGNOSIS — K63.5 COLON POLYPS: Primary | ICD-10-CM

## 2022-05-23 NOTE — TELEPHONE ENCOUNTER
Last scope  16 in epic--Personal history of polyps--No family history or polyps or colon cancer--Aspirin--Medications:        aspirin 81 MG tablet  atorvastatin (LIPITOR) 80 MG tablet  cholecalciferol (VITAMIN D3) 10 MCG (400 UNIT) tablet  diphenhydrAMINE-acetaminophen (TYLENOL PM)  MG tablet per tablet  isosorbide mononitrate (IMDUR) 30 MG 24 hr tablet  lisinopril (PRINIVIL,ZESTRIL) 10 MG tablet  loratadine (CLARITIN) 10 MG tablet ()  meclizine (ANTIVERT) 12.5 MG tablet  metoprolol succinate XL (TOPROL-XL) 50 MG 24 hr tablet  NEXIUM 24HR 20 MG tablet delayed-release  niacin (NIASPAN) 500 MG CR tablet  Omega-3 Fatty Acids (FISH OIL PO)  sertraline (ZOLOFT) 100 MG tablet                                    Pt verbalized and understood that it would be few weeks before been schedule

## 2022-05-26 ENCOUNTER — TELEPHONE (OUTPATIENT)
Dept: GASTROENTEROLOGY | Facility: CLINIC | Age: 72
End: 2022-05-26

## 2022-05-26 PROBLEM — K63.5 COLON POLYPS: Status: ACTIVE | Noted: 2022-05-26

## 2022-05-26 NOTE — TELEPHONE ENCOUNTER
[facility] for colonoscopy on 07/01/2022  arrive at  9am  . Mailed Prep instructions to Mailing address on-file. ----miralax      Advised PT  that  will call with final arrival time  24 hrs before procedure. If they do not get a phone call, arrival time will stay the same as given on instructions

## 2022-06-14 ENCOUNTER — HOSPITAL ENCOUNTER (OUTPATIENT)
Dept: CT IMAGING | Facility: HOSPITAL | Age: 72
Discharge: HOME | End: 2022-06-14
Admitting: NURSE PRACTITIONER

## 2022-06-14 DIAGNOSIS — Z12.2 SCREENING FOR LUNG CANCER: ICD-10-CM

## 2022-06-14 DIAGNOSIS — Z87.891 PERSONAL HISTORY OF NICOTINE DEPENDENCE: ICD-10-CM

## 2022-06-14 PROCEDURE — 71271 CT THORAX LUNG CANCER SCR C-: CPT

## 2022-07-01 ENCOUNTER — HOSPITAL ENCOUNTER (OUTPATIENT)
Facility: HOSPITAL | Age: 72
Setting detail: HOSPITAL OUTPATIENT SURGERY
Discharge: HOME | End: 2022-07-01
Attending: INTERNAL MEDICINE | Admitting: INTERNAL MEDICINE

## 2022-07-01 ENCOUNTER — ANESTHESIA (OUTPATIENT)
Dept: GASTROENTEROLOGY | Facility: HOSPITAL | Age: 72
End: 2022-07-01

## 2022-07-01 ENCOUNTER — ANESTHESIA EVENT (OUTPATIENT)
Dept: GASTROENTEROLOGY | Facility: HOSPITAL | Age: 72
End: 2022-07-01

## 2022-07-01 VITALS
WEIGHT: 215 LBS | HEART RATE: 67 BPM | BODY MASS INDEX: 28.49 KG/M2 | OXYGEN SATURATION: 95 % | DIASTOLIC BLOOD PRESSURE: 97 MMHG | HEIGHT: 73 IN | SYSTOLIC BLOOD PRESSURE: 148 MMHG | RESPIRATION RATE: 16 BRPM

## 2022-07-01 DIAGNOSIS — K63.5 COLON POLYPS: ICD-10-CM

## 2022-07-01 PROCEDURE — 45385 COLONOSCOPY W/LESION REMOVAL: CPT | Performed by: INTERNAL MEDICINE

## 2022-07-01 PROCEDURE — S0260 H&P FOR SURGERY: HCPCS | Performed by: INTERNAL MEDICINE

## 2022-07-01 PROCEDURE — 88305 TISSUE EXAM BY PATHOLOGIST: CPT | Performed by: INTERNAL MEDICINE

## 2022-07-01 PROCEDURE — 25010000002 PROPOFOL 10 MG/ML EMULSION: Performed by: ANESTHESIOLOGY

## 2022-07-01 RX ORDER — SODIUM CHLORIDE, SODIUM LACTATE, POTASSIUM CHLORIDE, CALCIUM CHLORIDE 600; 310; 30; 20 MG/100ML; MG/100ML; MG/100ML; MG/100ML
1000 INJECTION, SOLUTION INTRAVENOUS CONTINUOUS
Status: DISCONTINUED | OUTPATIENT
Start: 2022-07-01 | End: 2022-07-01 | Stop reason: HOSPADM

## 2022-07-01 RX ORDER — LIDOCAINE HYDROCHLORIDE 10 MG/ML
0.5 INJECTION, SOLUTION INFILTRATION; PERINEURAL ONCE AS NEEDED
Status: DISCONTINUED | OUTPATIENT
Start: 2022-07-01 | End: 2022-07-01 | Stop reason: HOSPADM

## 2022-07-01 RX ORDER — LIDOCAINE HYDROCHLORIDE 20 MG/ML
INJECTION, SOLUTION INFILTRATION; PERINEURAL AS NEEDED
Status: DISCONTINUED | OUTPATIENT
Start: 2022-07-01 | End: 2022-07-01 | Stop reason: SURG

## 2022-07-01 RX ORDER — SODIUM CHLORIDE 0.9 % (FLUSH) 0.9 %
10 SYRINGE (ML) INJECTION AS NEEDED
Status: DISCONTINUED | OUTPATIENT
Start: 2022-07-01 | End: 2022-07-01 | Stop reason: HOSPADM

## 2022-07-01 RX ORDER — PROPOFOL 10 MG/ML
VIAL (ML) INTRAVENOUS AS NEEDED
Status: DISCONTINUED | OUTPATIENT
Start: 2022-07-01 | End: 2022-07-01 | Stop reason: SURG

## 2022-07-01 RX ADMIN — LIDOCAINE HYDROCHLORIDE 60 MG: 20 INJECTION, SOLUTION INFILTRATION; PERINEURAL at 10:16

## 2022-07-01 RX ADMIN — PROPOFOL 100 MCG/KG/MIN: 10 INJECTION, EMULSION INTRAVENOUS at 10:19

## 2022-07-01 RX ADMIN — SODIUM CHLORIDE, POTASSIUM CHLORIDE, SODIUM LACTATE AND CALCIUM CHLORIDE 1000 ML: 600; 310; 30; 20 INJECTION, SOLUTION INTRAVENOUS at 09:32

## 2022-07-01 RX ADMIN — PROPOFOL 80 MG: 10 INJECTION, EMULSION INTRAVENOUS at 10:16

## 2022-07-01 NOTE — ANESTHESIA POSTPROCEDURE EVALUATION
Patient: Dwayne Keita    Procedure Summary     Date: 07/01/22 Room / Location:  [facility]    Anesthesia Start: 1014 Anesthesia Stop: 1034    Procedure: COLONOSCOPY TO CECUM/TI WITH COLD SNARE POLYPECTOMIES (N/A ) Diagnosis:       Colon polyps      (Colon polyps [K63.5])    Surgeons: Jac Bonilla MD Provider: Michelle Mattson MD    Anesthesia Type: MAC ASA Status: 3          Anesthesia Type: MAC    Vitals  No vitals data found for the desired time range.          Post Anesthesia Care and Evaluation    Patient location during evaluation: bedside  Patient participation: complete - patient participated  Level of consciousness: awake  Pain management: adequate    Airway patency: patent  Anesthetic complications: No anesthetic complications    Cardiovascular status: acceptable  Respiratory status: acceptable  Hydration status: acceptable

## 2022-07-01 NOTE — ANESTHESIA PREPROCEDURE EVALUATION
Anesthesia Evaluation                  Airway   Mallampati: III  TM distance: >3 FB  Neck ROM: full  No difficulty expected  Dental - normal exam     Pulmonary - normal exam   (+) a smoker Former, COPD,   Cardiovascular - normal exam    Patient on routine beta blocker    (+) hypertension, past MI , CAD, CABG, cardiac stents more than 12 months ago hyperlipidemia,       Neuro/Psych  (+) dizziness/light headedness, psychiatric history Anxiety,    GI/Hepatic/Renal/Endo    (+)  GERD,      Musculoskeletal     Abdominal    Substance History      OB/GYN          Other   arthritis,                    Anesthesia Plan    ASA 3     MAC     intravenous induction     Anesthetic plan, risks, benefits, and alternatives have been provided, discussed and informed consent has been obtained with: patient.        CODE STATUS:

## 2022-07-01 NOTE — H&P
[facility]  Pre Procedure History & Physical    Chief Complaint:   Time for my colonoscopy    Subjective     HPI:   72 y.o. male presenting to endoscopy unit today for surveillance colonoscopy.    Past Medical History:   Past Medical History:   Diagnosis Date   • Allergic rhinitis    • Anxiety state    • Arthritis    • Benign neoplasm of skin    • Benign positional vertigo    • Coronary artery disease    • Episodic mood disorder (HCC)    • GERD (gastroesophageal reflux disease)    • Hyperlipidemia    • Hypertension    • MI (myocardial infarction) (HCC)     2007   • Vertigo        Family History:  Family History   Problem Relation Age of Onset   • Thyroid disease Mother    • Malig Hyperthermia Neg Hx        Social History:   reports that he quit smoking about 14 years ago. His smoking use included cigarettes. He has a 67.50 pack-year smoking history. He has never used smokeless tobacco. He reports that he does not drink alcohol and does not use drugs.    Medications:   Medications Prior to Admission   Medication Sig Dispense Refill Last Dose   • aspirin 81 MG tablet Take 81 mg by mouth Daily.   6/29/2022   • atorvastatin (LIPITOR) 80 MG tablet Take 80 mg by mouth Daily.   6/30/2022 at Unknown time   • cholecalciferol (VITAMIN D3) 10 MCG (400 UNIT) tablet Take 400 Units by mouth Daily.   Past Week at Unknown time   • diphenhydrAMINE-acetaminophen (TYLENOL PM)  MG tablet per tablet Take 1 tablet by mouth At Night As Needed for Sleep.   Past Week at Unknown time   • isosorbide mononitrate (IMDUR) 30 MG 24 hr tablet Take 30 mg by mouth Daily. Dr Wade   6/30/2022 at Unknown time   • lisinopril (PRINIVIL,ZESTRIL) 10 MG tablet    6/30/2022 at Unknown time   • metoprolol succinate XL (TOPROL-XL) 50 MG 24 hr tablet Take 50 mg by mouth Daily. 1/2 TABLET DAILY   6/30/2022 at Unknown time   • NEXIUM 24HR 20 MG tablet delayed-release    6/30/2022 at Unknown time   • niacin (NIASPAN) 500 MG CR  "tablet Take 500 mg by mouth Every Night.   6/30/2022 at Unknown time   • Omega-3 Fatty Acids (FISH OIL PO) Take 1 capsule by mouth Daily.   Past Week at Unknown time   • sertraline (ZOLOFT) 100 MG tablet TAKE 1/2 TABLET EVERY DAY 45 tablet 1 6/30/2022 at Unknown time   • loratadine (CLARITIN) 10 MG tablet Take 1 tablet by mouth Daily for 10 days. 10 tablet     • meclizine (ANTIVERT) 12.5 MG tablet Take 1 tablet by mouth 3 (Three) Times a Day As Needed for Dizziness. 30 tablet 0 Unknown at Unknown time       Allergies:  Patient has no known allergies.    ROS:    Pertinent items are noted in HPI     Objective     Blood pressure 157/90, pulse 90, resp. rate 16, height 185.4 cm (73\"), weight 97.5 kg (215 lb), SpO2 97 %.    Physical Exam   Constitutional: Pt is oriented to person, place, and time and well-developed, well-nourished, and in no distress.   Abdominal: Soft.   Psychiatric: Mood, memory, affect and judgment normal.     Assessment & Plan     Diagnosis:  Personal hx of colon polyps    Anticipated Surgical Procedure:  Colonoscopy    The risks, benefits, and alternatives of this procedure have been discussed with the patient or the responsible party- the patient understands and agrees to proceed.                                                                "

## 2022-07-01 NOTE — DISCHARGE INSTRUCTIONS

## 2022-07-05 LAB
LAB AP CASE REPORT: NORMAL
PATH REPORT.FINAL DX SPEC: NORMAL
PATH REPORT.GROSS SPEC: NORMAL

## 2022-07-06 ENCOUNTER — TELEPHONE (OUTPATIENT)
Dept: GASTROENTEROLOGY | Facility: CLINIC | Age: 72
End: 2022-07-06

## 2022-07-06 NOTE — TELEPHONE ENCOUNTER
----- Message from Jac Bonilla MD sent at 7/5/2022  4:38 PM EDT -----  Tubular adenoma colon polyps  Colonoscopy recall 3 yrs

## 2022-07-06 NOTE — TELEPHONE ENCOUNTER
Called pt and left detailed msg on identified vm.  Advised to call back if any questions.     Also [facility] and to call back if any questions.     Colonoscopy placed in recall for 7/1/2025.

## 2022-08-01 ENCOUNTER — HOSPITAL ENCOUNTER (OUTPATIENT)
Dept: GENERAL RADIOLOGY | Facility: HOSPITAL | Age: 72
Discharge: HOME | End: 2022-08-01

## 2022-08-01 ENCOUNTER — OFFICE VISIT (OUTPATIENT)
Dept: INTERNAL MEDICINE | Facility: CLINIC | Age: 72
End: 2022-08-01

## 2022-08-01 VITALS
BODY MASS INDEX: 29.61 KG/M2 | WEIGHT: 223.4 LBS | TEMPERATURE: 98.2 F | DIASTOLIC BLOOD PRESSURE: 88 MMHG | OXYGEN SATURATION: 95 % | HEIGHT: 73 IN | HEART RATE: 71 BPM | SYSTOLIC BLOOD PRESSURE: 142 MMHG

## 2022-08-01 DIAGNOSIS — M25.571 ACUTE RIGHT ANKLE PAIN: ICD-10-CM

## 2022-08-01 DIAGNOSIS — M79.671 PAIN OF RIGHT HEEL: Primary | ICD-10-CM

## 2022-08-01 PROCEDURE — 73630 X-RAY EXAM OF FOOT: CPT

## 2022-08-01 PROCEDURE — 73610 X-RAY EXAM OF ANKLE: CPT

## 2022-08-01 PROCEDURE — 99213 OFFICE O/P EST LOW 20 MIN: CPT | Performed by: NURSE PRACTITIONER

## 2022-08-01 NOTE — PROGRESS NOTES
"Chief Complaint  Foot Pain (Heel pain)    Subjective        Dwayne PATRICA Keita presents to [facility] due to right heel and ankle pain.    He c/o right heel and ankle pain for the past year which has been more severe over the past month. Pain is worse with first standing and improves within a few steps and then worsens. Denies paresthesias of right foot. He denies acute injury or trauma, does stand and walk long distances at work.      Objective   Vital Signs:  /88 (BP Location: Left arm, Patient Position: Sitting, Cuff Size: Adult)   Pulse 71   Temp 98.2 °F (36.8 °C) (Temporal)   Ht 185.4 cm (73\")   Wt 101 kg (223 lb 6.4 oz)   SpO2 95%   BMI 29.47 kg/m²   Estimated body mass index is 29.47 kg/m² as calculated from the following:    Height as of this encounter: 185.4 cm (73\").    Weight as of this encounter: 101 kg (223 lb 6.4 oz).          Physical Exam  Constitutional:       Appearance: He is well-developed. He is not ill-appearing.   HENT:      Head: Normocephalic.      Right Ear: Hearing, tympanic membrane and external ear normal.      Left Ear: Hearing, tympanic membrane and external ear normal.      Nose: Nose normal. No nasal deformity, mucosal edema or rhinorrhea.      Right Sinus: No maxillary sinus tenderness or frontal sinus tenderness.      Left Sinus: No maxillary sinus tenderness or frontal sinus tenderness.      Mouth/Throat:      Dentition: Normal dentition.   Eyes:      General: Lids are normal.         Right eye: No discharge.         Left eye: No discharge.      Conjunctiva/sclera: Conjunctivae normal.      Right eye: No exudate.     Left eye: No exudate.  Neck:      Thyroid: No thyroid mass or thyromegaly.      Vascular: No carotid bruit.      Trachea: Trachea normal.   Cardiovascular:      Rate and Rhythm: Regular rhythm.      Pulses: Normal pulses.      Heart sounds: Normal heart sounds. No murmur heard.  Pulmonary:      Effort: No respiratory distress. "      Breath sounds: Normal breath sounds. No decreased breath sounds, wheezing, rhonchi or rales.   Abdominal:      General: Bowel sounds are normal.      Palpations: Abdomen is soft.      Tenderness: There is no abdominal tenderness.   Musculoskeletal:      Cervical back: Normal range of motion. No edema.      Right foot: Normal range of motion.   Feet:      Right foot:      Skin integrity: No erythema or warmth.      Comments: +tenderness along right heel and right inner ankle  Lymphadenopathy:      Head:      Right side of head: No submental, submandibular, tonsillar, preauricular, posterior auricular or occipital adenopathy.      Left side of head: No submental, submandibular, tonsillar, preauricular, posterior auricular or occipital adenopathy.   Skin:     General: Skin is warm and dry.      Nails: There is no clubbing.   Neurological:      Mental Status: He is alert.   Psychiatric:         Behavior: Behavior is cooperative.        Result Review :  The following data was reviewed by: AMBROSIO Chance on 08/01/2022:  Common labs    Common Labsle 10/19/21 10/19/21 10/19/21 10/19/21 4/25/22 4/25/22 4/25/22    0913 0913 0913 0913 0922 0922 0922   Glucose  102 (A)    110 (A)    BUN  12    15    Creatinine  1.01    0.89    eGFR Non  Am  73        eGFR African Am  85        Sodium  143    140    Potassium  4.4    4.3    Chloride  103    106    Calcium  9.6    9.4    Total Protein  6.6    6.5    Albumin  4.2    4.3    Total Bilirubin  0.6    0.6    Alkaline Phosphatase  101    84    AST (SGOT)  27    29    ALT (SGPT)  20    25    WBC 5.5    6.0     Hemoglobin 15.1    15.3     Hematocrit 45.5    44.9     Platelets 215    215     Total Cholesterol   124    113   Triglycerides   171 (A)    105   HDL Cholesterol   29 (A)    28 (A)   LDL Cholesterol    66    65   PSA    0.7      (A) Abnormal value       Comments are available for some flowsheets but are not being displayed.           Data reviewed: Radiologic  studies Right ankle and foot 8/1/22          Assessment and Plan   Diagnoses and all orders for this visit:    1. Pain of right heel (Primary)  -     XR Foot 3+ View Right  -     Ambulatory Referral to Podiatry  -     Diclofenac Sodium (VOLTAREN) 1 % gel gel; Apply 4 g topically to the appropriate area as directed 4 (Four) Times a Day As Needed (pain).  Dispense: 50 g; Refill: 0    2. Acute right ankle pain  -     XR Ankle 3+ View Right  -     Diclofenac Sodium (VOLTAREN) 1 % gel gel; Apply 4 g topically to the appropriate area as directed 4 (Four) Times a Day As Needed (pain).  Dispense: 50 g; Refill: 0    Sx suggestive of plantar fasciitis with possible heel spur? Information including stretches given for plantar fasciitis. Will obtain xrays of right foot and ankle to further evaluate, may apply Voltaren gel.  He will also be referred to podiatry for further management, will follow with xray results.         Follow Up   Return if symptoms worsen or fail to improve, for Next scheduled follow up.  Patient was given instructions and counseling regarding his condition or for health maintenance advice. Please see specific information pulled into the AVS if appropriate.

## 2022-08-05 ENCOUNTER — TELEPHONE (OUTPATIENT)
Dept: INTERNAL MEDICINE | Facility: CLINIC | Age: 72
End: 2022-08-05

## 2022-08-05 NOTE — TELEPHONE ENCOUNTER
Caller: Carlota Cabrera    Relationship: Emergency Contact    Best call back number: [phone] (M)    Caller requesting test results: Carlota Cabrera ON  [facility]    What test was performed: XRAYS OF RIGHT FOOT AND ANKLE    When was the test performed:  08/01/2022    Where was the test performed: [facility]    Additional notes: PATIENT'S WIFE WOULD LIKE A CALL BACK FROM BERTHA GAMBOA ONCE BERTHA GAMBOA HAS HAD A CHANCE TO LOOK AT THE RESULTS OF THESE XRAYS TO DISCUSS THE RESULTS AND THE NEXT STEPS FOR THE PATIENT, PLEASE CALL BACK ASAP

## 2022-08-05 NOTE — TELEPHONE ENCOUNTER
Discussed results of xray with patient including heel spur and cyst. He is scheduled with podiatry next Friday for further evaluation, advised to  films.

## 2022-08-11 DIAGNOSIS — J34.89 SINUS DRAINAGE: ICD-10-CM

## 2022-08-11 RX ORDER — SERTRALINE HYDROCHLORIDE 100 MG/1
TABLET, FILM COATED ORAL
Qty: 45 TABLET | Refills: 1 | Status: SHIPPED | OUTPATIENT
Start: 2022-08-11 | End: 2023-04-03

## 2022-09-26 ENCOUNTER — OFFICE VISIT (OUTPATIENT)
Dept: INTERNAL MEDICINE | Facility: CLINIC | Age: 72
End: 2022-09-26

## 2022-09-26 VITALS
SYSTOLIC BLOOD PRESSURE: 140 MMHG | TEMPERATURE: 98.1 F | DIASTOLIC BLOOD PRESSURE: 92 MMHG | BODY MASS INDEX: 29.47 KG/M2 | HEIGHT: 73 IN | HEART RATE: 88 BPM | OXYGEN SATURATION: 94 %

## 2022-09-26 DIAGNOSIS — Z20.822 SUSPECTED COVID-19 VIRUS INFECTION: ICD-10-CM

## 2022-09-26 DIAGNOSIS — R11.2 NAUSEA AND VOMITING, UNSPECIFIED VOMITING TYPE: Primary | ICD-10-CM

## 2022-09-26 PROCEDURE — 99213 OFFICE O/P EST LOW 20 MIN: CPT | Performed by: NURSE PRACTITIONER

## 2022-09-26 RX ORDER — ONDANSETRON 4 MG/1
4 TABLET, FILM COATED ORAL EVERY 8 HOURS PRN
Qty: 20 TABLET | Refills: 0 | Status: SHIPPED | OUTPATIENT
Start: 2022-09-26

## 2022-09-26 RX ORDER — CETIRIZINE HYDROCHLORIDE 10 MG/1
CAPSULE, LIQUID FILLED ORAL
COMMUNITY

## 2022-09-27 LAB
LABCORP SARS-COV-2, NAA 2 DAY TAT: NORMAL
SARS-COV-2 RNA RESP QL NAA+PROBE: NOT DETECTED

## 2022-09-27 NOTE — PROGRESS NOTES
"Chief Complaint  Fever (100.9) and Vomiting    Subjective        Dwayne Keita presents to [facility] due to nausea with vomiting.    History of Present Illness  He was at a picnic yesterday where he ate fried k[place]og[place].  He was feeling well until he woke up with severe nausea and elevated temp of 100.9 this morning.  He was also experiencing extreme shaking so his wife called EMS.  She gave him a lorazepam and states his symptoms gradually improved.  He has had several episodes of vomiting since then but denies abdominal pain.  No known COVID-19 exposure.  They did take a home test which was negative.  He complains of epigastric burning this morning, has not taken his daily Nexium.    Objective   Vital Signs:  /92 (BP Location: Left arm, Patient Position: Sitting, Cuff Size: Adult)   Pulse 88   Temp 98.1 °F (36.7 °C) (Oral)   Ht 185.4 cm (73\")   SpO2 94%   BMI 29.47 kg/m²   Estimated body mass index is 29.47 kg/m² as calculated from the following:    Height as of this encounter: 185.4 cm (73\").    Weight as of 8/1/22: 101 kg (223 lb 6.4 oz).    BMI is >= 25 and <30. (Overweight) The following options were offered after discussion;: nutrition counseling/recommendations      Physical Exam  Constitutional:       Appearance: He is well-developed. He is not ill-appearing.   HENT:      Head: Normocephalic.      Right Ear: Hearing, tympanic membrane and external ear normal.      Left Ear: Hearing, tympanic membrane and external ear normal.      Nose: Nose normal. No nasal deformity, mucosal edema or rhinorrhea.      Right Sinus: No maxillary sinus tenderness or frontal sinus tenderness.      Left Sinus: No maxillary sinus tenderness or frontal sinus tenderness.      Mouth/Throat:      Dentition: Normal dentition.   Eyes:      General: Lids are normal.         Right eye: No discharge.         Left eye: No discharge.      Conjunctiva/sclera: Conjunctivae normal.      Right eye: No " exudate.     Left eye: No exudate.  Neck:      Thyroid: No thyroid mass or thyromegaly.      Vascular: No carotid bruit.      Trachea: Trachea normal.   Cardiovascular:      Rate and Rhythm: Regular rhythm.      Pulses: Normal pulses.      Heart sounds: Normal heart sounds. No murmur heard.  Pulmonary:      Effort: No respiratory distress.      Breath sounds: Normal breath sounds. No decreased breath sounds, wheezing, rhonchi or rales.   Abdominal:      General: Bowel sounds are normal.      Palpations: Abdomen is soft.      Tenderness: There is no abdominal tenderness.   Musculoskeletal:      Cervical back: Normal range of motion. No edema.   Lymphadenopathy:      Head:      Right side of head: No submental, submandibular, tonsillar, preauricular, posterior auricular or occipital adenopathy.      Left side of head: No submental, submandibular, tonsillar, preauricular, posterior auricular or occipital adenopathy.   Skin:     General: Skin is warm and dry.      Nails: There is no clubbing.   Neurological:      Mental Status: He is alert.   Psychiatric:         Behavior: Behavior is cooperative.        Result Review :  The following data was reviewed by: AMBROSIO Chance on 09/26/2022:  Common labs    Common Labs 10/19/21 10/19/21 10/19/21 10/19/21 4/25/22 4/25/22 4/25/22    0913 0913 0913 0913 0922 0922 0922   Glucose  102 (A)    110 (A)    BUN  12    15    Creatinine  1.01    0.89    eGFR Non  Am  73        eGFR African Am  85        Sodium  143    140    Potassium  4.4    4.3    Chloride  103    106    Calcium  9.6    9.4    Total Protein  6.6    6.5    Albumin  4.2    4.3    Total Bilirubin  0.6    0.6    Alkaline Phosphatase  101    84    AST (SGOT)  27    29    ALT (SGPT)  20    25    WBC 5.5    6.0     Hemoglobin 15.1    15.3     Hematocrit 45.5    44.9     Platelets 215    215     Total Cholesterol   124    113   Triglycerides   171 (A)    105   HDL Cholesterol   29 (A)    28 (A)   LDL Cholesterol     66    65   PSA    0.7      (A) Abnormal value       Comments are available for some flowsheets but are not being displayed.                     Assessment and Plan   Diagnoses and all orders for this visit:    1. Nausea and vomiting, unspecified vomiting type (Primary)  -     ondansetron (ZOFRAN) 4 MG tablet; Take 1 tablet by mouth Every 8 (Eight) Hours As Needed for Nausea or Vomiting.  Dispense: 20 tablet; Refill: 0    2. Suspected COVID-19 virus infection  -     COVID-19,[facility] ROUTINE, NP/OP SWAB IN TRANSPORT MEDIA OR ESWAB 72 HR TAT - Swab, Anterior nasal    Symptoms due to viral gastroenteritis versus intolerance to recent diet.  Will also obtain COVID PCR test due to fever and body aches.  He will follow a bland diet over the next couple days and increase fluids as tolerated.  He may also take Zofran as needed for nausea.  Report to ER with abdominal pain or worsening symptoms.  He will quarantine until results of COVID-19 test are received.         Follow Up   Return if symptoms worsen or fail to improve, for Next scheduled follow up.  Patient was given instructions and counseling regarding his condition or for health maintenance advice. Please see specific information pulled into the AVS if appropriate.

## 2022-10-27 ENCOUNTER — TELEPHONE (OUTPATIENT)
Dept: INTERNAL MEDICINE | Facility: CLINIC | Age: 72
End: 2022-10-27

## 2022-10-27 NOTE — TELEPHONE ENCOUNTER
PATIENT'S WIFE CALLED REQUESTING A COPY OF HIS CT SCAN OF LUNGS DONE ON 6/14/22    SHE NEEDS THIS FOR INSURANCE PURPOSES'S.    S[facility] CALLED [facility] AND THEY SAID SHE SHOULD BE ABLE TO SEE IT ON MY CHART BUT SHE IS UNABLE TO     SHE CAN PICK IT UP ON 11/1/22 AT HER VISIT    CALL BACK NUMBER [phone]

## 2022-11-01 ENCOUNTER — OFFICE VISIT (OUTPATIENT)
Dept: INTERNAL MEDICINE | Facility: CLINIC | Age: 72
End: 2022-11-01

## 2022-11-01 VITALS
SYSTOLIC BLOOD PRESSURE: 134 MMHG | OXYGEN SATURATION: 96 % | BODY MASS INDEX: 28.92 KG/M2 | DIASTOLIC BLOOD PRESSURE: 84 MMHG | HEART RATE: 86 BPM | HEIGHT: 73 IN | WEIGHT: 218.2 LBS | TEMPERATURE: 97.7 F

## 2022-11-01 DIAGNOSIS — E78.00 PURE HYPERCHOLESTEROLEMIA: Chronic | ICD-10-CM

## 2022-11-01 DIAGNOSIS — I25.10 CORONARY ARTERIOSCLEROSIS IN NATIVE ARTERY: Chronic | ICD-10-CM

## 2022-11-01 DIAGNOSIS — Z00.00 MEDICARE ANNUAL WELLNESS VISIT, SUBSEQUENT: Primary | ICD-10-CM

## 2022-11-01 DIAGNOSIS — Z12.5 SCREENING FOR PROSTATE CANCER: ICD-10-CM

## 2022-11-01 DIAGNOSIS — I10 PRIMARY HYPERTENSION: Chronic | ICD-10-CM

## 2022-11-01 DIAGNOSIS — Z23 NEED FOR INFLUENZA VACCINATION: ICD-10-CM

## 2022-11-01 LAB
ALBUMIN SERPL-MCNC: 4.2 G/DL (ref 3.5–5.2)
ALBUMIN/GLOB SERPL: 1.8 G/DL
ALP SERPL-CCNC: 102 U/L (ref 39–117)
ALT SERPL-CCNC: 26 U/L (ref 1–41)
AST SERPL-CCNC: 25 U/L (ref 1–40)
BASOPHILS # BLD AUTO: 0.09 10*3/MM3 (ref 0–0.2)
BASOPHILS NFR BLD AUTO: 1.5 % (ref 0–1.5)
BILIRUB SERPL-MCNC: 0.6 MG/DL (ref 0–1.2)
BUN SERPL-MCNC: 12 MG/DL (ref 8–23)
BUN/CREAT SERPL: 11.7 (ref 7–25)
CALCIUM SERPL-MCNC: 10.1 MG/DL (ref 8.6–10.5)
CHLORIDE SERPL-SCNC: 104 MMOL/L (ref 98–107)
CHOLEST SERPL-MCNC: 115 MG/DL (ref 0–200)
CO2 SERPL-SCNC: 28.4 MMOL/L (ref 22–29)
CREAT SERPL-MCNC: 1.03 MG/DL (ref 0.76–1.27)
EGFRCR SERPLBLD CKD-EPI 2021: 76.2 ML/MIN/1.73
EOSINOPHIL # BLD AUTO: 0.33 10*3/MM3 (ref 0–0.4)
EOSINOPHIL NFR BLD AUTO: 5.6 % (ref 0.3–6.2)
ERYTHROCYTE [DISTWIDTH] IN BLOOD BY AUTOMATED COUNT: 13.7 % (ref 12.3–15.4)
GLOBULIN SER CALC-MCNC: 2.3 GM/DL
GLUCOSE SERPL-MCNC: 106 MG/DL (ref 65–99)
HCT VFR BLD AUTO: 44.1 % (ref 37.5–51)
HDLC SERPL-MCNC: 30 MG/DL (ref 40–60)
HGB BLD-MCNC: 14.9 G/DL (ref 13–17.7)
IMM GRANULOCYTES # BLD AUTO: 0.02 10*3/MM3 (ref 0–0.05)
IMM GRANULOCYTES NFR BLD AUTO: 0.3 % (ref 0–0.5)
LDLC SERPL CALC-MCNC: 58 MG/DL (ref 0–100)
LYMPHOCYTES # BLD AUTO: 1.35 10*3/MM3 (ref 0.7–3.1)
LYMPHOCYTES NFR BLD AUTO: 23.1 % (ref 19.6–45.3)
MCH RBC QN AUTO: 30.2 PG (ref 26.6–33)
MCHC RBC AUTO-ENTMCNC: 33.8 G/DL (ref 31.5–35.7)
MCV RBC AUTO: 89.5 FL (ref 79–97)
MONOCYTES # BLD AUTO: 0.46 10*3/MM3 (ref 0.1–0.9)
MONOCYTES NFR BLD AUTO: 7.9 % (ref 5–12)
NEUTROPHILS # BLD AUTO: 3.6 10*3/MM3 (ref 1.7–7)
NEUTROPHILS NFR BLD AUTO: 61.6 % (ref 42.7–76)
NRBC BLD AUTO-RTO: 0 /100 WBC (ref 0–0.2)
PLATELET # BLD AUTO: 210 10*3/MM3 (ref 140–450)
POTASSIUM SERPL-SCNC: 4.2 MMOL/L (ref 3.5–5.2)
PROT SERPL-MCNC: 6.5 G/DL (ref 6–8.5)
PSA SERPL-MCNC: 0.6 NG/ML (ref 0–4)
RBC # BLD AUTO: 4.93 10*6/MM3 (ref 4.14–5.8)
SODIUM SERPL-SCNC: 140 MMOL/L (ref 136–145)
TRIGL SERPL-MCNC: 154 MG/DL (ref 0–150)
TSH SERPL DL<=0.005 MIU/L-ACNC: 2.32 UIU/ML (ref 0.27–4.2)
VLDLC SERPL CALC-MCNC: 27 MG/DL (ref 5–40)
WBC # BLD AUTO: 5.85 10*3/MM3 (ref 3.4–10.8)

## 2022-11-01 PROCEDURE — G0008 ADMIN INFLUENZA VIRUS VAC: HCPCS | Performed by: NURSE PRACTITIONER

## 2022-11-01 PROCEDURE — 90662 IIV NO PRSV INCREASED AG IM: CPT | Performed by: NURSE PRACTITIONER

## 2022-11-01 PROCEDURE — G0439 PPPS, SUBSEQ VISIT: HCPCS | Performed by: NURSE PRACTITIONER

## 2022-11-01 PROCEDURE — 1170F FXNL STATUS ASSESSED: CPT | Performed by: NURSE PRACTITIONER

## 2022-11-01 PROCEDURE — 1159F MED LIST DOCD IN RCRD: CPT | Performed by: NURSE PRACTITIONER

## 2022-11-01 PROCEDURE — 96160 PT-FOCUSED HLTH RISK ASSMT: CPT | Performed by: NURSE PRACTITIONER

## 2022-11-01 NOTE — PATIENT INSTRUCTIONS
Medicare Wellness  Personal Prevention Plan of Service     Date of Office Visit:    Encounter Provider:  AMBROSIO Chance  Place of Service:  [facility]  Patient Name: Dwayne Keita  :  1950    As part of the Medicare Wellness portion of your visit today, we are providing you with this personalized preventive plan of services (PPPS). This plan is based upon recommendations of the [place] Preventive Services Task Force (USPSTF) and the Advisory Committee on Immunization Practices (ACIP).    This lists the preventive care services that should be considered, and provides dates of when you are due. Items listed as completed are up-to-date and do not require any further intervention.    Health Maintenance   Topic Date Due    ZOSTER VACCINE (1 of 2) Never done    COVID-19 Vaccine (5 - Booster for Pfizer series) 2022    ANNUAL WELLNESS VISIT  10/13/2022    TDAP/TD VACCINES (2 - Tdap) 2029 (Originally 2/3/2029)    LUNG CANCER SCREENING  2023    LIPID PANEL  2023    COLORECTAL CANCER SCREENING  2025    HEPATITIS C SCREENING  Completed    INFLUENZA VACCINE  Completed    Pneumococcal Vaccine 65+  Completed    AAA SCREEN (ONE-TIME)  Completed       Orders Placed This Encounter   Procedures    Fluzone High-Dose 65+yrs    Comprehensive Metabolic Panel     Order Specific Question:   Release to patient     Answer:   Routine Release     Order Specific Question:   [facility] Has the patient fasted?     Answer:   Yes    Lipid Panel     Order Specific Question:   [facility] Has the patient fasted?     Answer:   Yes    TSH     Order Specific Question:   Release to patient     Answer:   Routine Release     Order Specific Question:   [facility] Has the patient fasted?     Answer:   Yes    PSA Screen     Order Specific Question:   Release to patient     Answer:   Routine Release     Order Specific Question:   [facility] Has the patient fasted?     Answer:   Yes     CBC & Differential     Order Specific Question:   Manual Differential     Answer:   No     Order Specific Question:   [facility] Has the patient fasted?     Answer:   Yes       Return in about 6 months (around 5/1/2023).

## 2022-11-01 NOTE — PROGRESS NOTES
The ABCs of the Annual Wellness Visit  Subsequent Medicare Wellness Visit    Chief Complaint   Patient presents with   • Medicare Wellness-subsequent      Subjective    History of Present Illness:  Dwayne Keita is a 72 y.o. male who presents for a Subsequent Medicare Wellness Visit.    He underwent a nuclear stress test July 2022 per cardiology due to worsening shortness of breath which did not reveal any acute abnormalities.  He complains of fatigue with overexertion which improves with rest, denies chest pain or palpitations.    His colonoscopy in July showed a tubular adenoma, repeat planned for 3 years.    The following portions of the patient's history were reviewed and   updated as appropriate: allergies, current medications, past family history, past medical history, past social history, past surgical history and problem list.    Compared to one year ago, the patient feels his physical   health is the same.    Compared to one year ago, the patient feels his mental   health is the same.    Recent Hospitalizations:  He was not admitted to the hospital during the last year.       Current Medical Providers:  Patient Care Team:  Ivelisse Casarez APRN as PCP - General (Internal Medicine)    Outpatient Medications Prior to Visit   Medication Sig Dispense Refill   • aspirin 81 MG tablet Take 81 mg by mouth Daily.     • atorvastatin (LIPITOR) 80 MG tablet Take 80 mg by mouth Daily.     • Cetirizine HCl (ZyrTEC Allergy) 10 MG capsule Take  by mouth.     • cholecalciferol (VITAMIN D3) 10 MCG (400 UNIT) tablet Take 400 Units by mouth Daily.     • diphenhydrAMINE-acetaminophen (TYLENOL PM)  MG tablet per tablet Take 1 tablet by mouth At Night As Needed for Sleep.     • isosorbide mononitrate (IMDUR) 30 MG 24 hr tablet Take 30 mg by mouth Daily. Dr Wade     • lisinopril (PRINIVIL,ZESTRIL) 10 MG tablet      • loratadine (CLARITIN) 10 MG tablet Take 1 tablet by mouth Daily for 10 days. 10 tablet    • meclizine  (ANTIVERT) 12.5 MG tablet Take 1 tablet by mouth 3 (Three) Times a Day As Needed for Dizziness. 30 tablet 0   • metoprolol succinate XL (TOPROL-XL) 50 MG 24 hr tablet Take 50 mg by mouth Daily. 1/2 TABLET DAILY     • NEXIUM 24HR 20 MG tablet delayed-release      • niacin (NIASPAN) 500 MG CR tablet Take 500 mg by mouth Every Night.     • Omega-3 Fatty Acids (FISH OIL PO) Take 1 capsule by mouth Daily.     • ondansetron (ZOFRAN) 4 MG tablet Take 1 tablet by mouth Every 8 (Eight) Hours As Needed for Nausea or Vomiting. 20 tablet 0   • sertraline (ZOLOFT) 100 MG tablet TAKE 1/2 TABLET EVERY DAY 45 tablet 1   • Diclofenac Sodium (VOLTAREN) 1 % gel gel Apply 4 g topically to the appropriate area as directed 4 (Four) Times a Day As Needed (pain). 50 g 0     No facility-administered medications prior to visit.       No opioid medication identified on active medication list. I have reviewed chart for other potential  high risk medication/s and harmful drug interactions in the elderly.          Aspirin is on active medication list. Aspirin use is indicated based on review of current medical condition/s. Pros and cons of this therapy have been discussed today. Benefits of this medication outweigh potential harm.  Patient has been encouraged to continue taking this medication.  .      Patient Active Problem List   Diagnosis   • Benign positional vertigo   • Hypertension   • Hyperlipidemia   • Coronary arteriosclerosis in native artery   • GERD (gastroesophageal reflux disease)   • Allergic rhinitis   • Other emphysema (HCC)   • Colon polyps     Advance Care Planning  Advance Directive is on file.  ACP discussion was held with the patient during this visit. Patient has an advance directive in EMR which is still valid.     Review of Systems   Constitutional: Positive for fatigue. Negative for fever.   HENT: Positive for congestion and postnasal drip.    Respiratory: Positive for shortness of breath. Negative for cough.   "  Gastrointestinal: Negative for abdominal pain, constipation and diarrhea.   Musculoskeletal: Positive for arthralgias.        Objective    Vitals:    11/01/22 0823 11/01/22 0844   BP: 134/84    BP Location: Left arm    Patient Position: Sitting    Cuff Size: Adult    Pulse: (!) 46 86   Temp: 97.7 °F (36.5 °C)    TempSrc: Temporal    SpO2: 96%    Weight: 99 kg (218 lb 3.2 oz)    Height: 185.4 cm (73\")      Estimated body mass index is 28.79 kg/m² as calculated from the following:    Height as of this encounter: 185.4 cm (73\").    Weight as of this encounter: 99 kg (218 lb 3.2 oz).    BMI is >= 25 and <30. (Overweight) The following options were offered after discussion;: exercise counseling/recommendations and nutrition counseling/recommendations      Does the patient have evidence of cognitive impairment? No    Physical Exam  Constitutional:       Appearance: He is well-developed. He is not ill-appearing.   HENT:      Head: Normocephalic.      Right Ear: Hearing, tympanic membrane and external ear normal.      Left Ear: Hearing, tympanic membrane and external ear normal.      Nose: Nose normal. No nasal deformity, mucosal edema or rhinorrhea.      Right Sinus: No maxillary sinus tenderness or frontal sinus tenderness.      Left Sinus: No maxillary sinus tenderness or frontal sinus tenderness.      Mouth/Throat:      Dentition: Normal dentition.   Eyes:      General: Lids are normal.         Right eye: No discharge.         Left eye: No discharge.      Conjunctiva/sclera: Conjunctivae normal.      Right eye: No exudate.     Left eye: No exudate.  Neck:      Thyroid: No thyroid mass or thyromegaly.      Vascular: No carotid bruit.      Trachea: Trachea normal.   Cardiovascular:      Rate and Rhythm: Regular rhythm.      Pulses: Normal pulses.      Heart sounds: Normal heart sounds. No murmur heard.  Pulmonary:      Effort: No respiratory distress.      Breath sounds: Normal breath sounds. No decreased breath sounds, " wheezing, rhonchi or rales.   Abdominal:      General: Bowel sounds are normal.      Palpations: Abdomen is soft.      Tenderness: There is no abdominal tenderness.   Musculoskeletal:      Cervical back: Normal range of motion. No edema.   Lymphadenopathy:      Head:      Right side of head: No submental, submandibular, tonsillar, preauricular, posterior auricular or occipital adenopathy.      Left side of head: No submental, submandibular, tonsillar, preauricular, posterior auricular or occipital adenopathy.   Skin:     General: Skin is warm and dry.      Nails: There is no clubbing.   Neurological:      Mental Status: He is alert.   Psychiatric:         Behavior: Behavior is cooperative.       Lab Results   Component Value Date    CHLPL 115 2022    TRIG 154 (H) 2022    HDL 30 (L) 2022    LDL 58 2022    VLDL 27 2022            HEALTH RISK ASSESSMENT    Smoking Status:  Social History     Tobacco Use   Smoking Status Former   • Packs/day: 1.50   • Years: 45.00   • Pack years: 67.50   • Types: Cigarettes   • Quit date:    • Years since quittin.8   Smokeless Tobacco Never     Alcohol Consumption:  Social History     Substance and Sexual Activity   Alcohol Use No     Fall Risk Screen:    LINDAADI Fall Risk Assessment was completed, and patient is at LOW risk for falls.Assessment completed on:2022    Depression Screening:  PHQ-2/PHQ-9 Depression Screening 2022   Retired PHQ-9 Total Score -   Retired Total Score -   Little Interest or Pleasure in Doing Things 0-->not at all   Feeling Down, Depressed or Hopeless 0-->not at all   PHQ-9: Brief Depression Severity Measure Score 0       Health Habits and Functional and Cognitive Screening:  Functional & Cognitive Status 2022   Do you have difficulty preparing food and eating? No   Do you have difficulty bathing yourself, getting dressed or grooming yourself? No   Do you have difficulty using the toilet? No   Do you have  difficulty moving around from place to place? No   Do you have trouble with steps or getting out of a bed or a chair? No   Current Diet Well Balanced Diet   Dental Exam Up to date   Eye Exam Up to date   Exercise (times per week) 0 times per week   Current Exercises Include No Regular Exercise   Current Exercise Activities Include -   Do you need help using the phone?  No   Are you deaf or do you have serious difficulty hearing?  Yes   Do you need help with transportation? No   Do you need help shopping? No   Do you need help preparing meals?  No   Do you need help with housework?  No   Do you need help with laundry? No   Do you need help taking your medications? No   Do you need help managing money? No   Do you ever drive or ride in a car without wearing a seat belt? No   Have you felt unusual stress, anger or loneliness in the last month? No   Who do you live with? Spouse   If you need help, do you have trouble finding someone available to you? No   Have you been bothered in the last four weeks by sexual problems? No   Do you have difficulty concentrating, remembering or making decisions? No       Age-appropriate Screening Schedule:  Refer to the list below for future screening recommendations based on patient's age, sex and/or medical conditions. Orders for these recommended tests are listed in the plan section. The patient has been provided with a written plan.    Health Maintenance   Topic Date Due   • ZOSTER VACCINE (1 of 2) Never done   • TDAP/TD VACCINES (2 - Tdap) 07/17/2029 (Originally 2/3/2029)   • LIPID PANEL  11/01/2023   • INFLUENZA VACCINE  Completed              Assessment & Plan   CMS Preventative Services Quick Reference  Risk Factors Identified During Encounter  Immunizations Discussed/Encouraged (specific Immunizations; Shingrix and COVID19  The above risks/problems have been discussed with the patient.  Follow up actions/plans if indicated are seen below in the Assessment/Plan  Section.  Pertinent information has been shared with the patient in the After Visit Summary.    Diagnoses and all orders for this visit:    1. Medicare annual wellness visit, subsequent (Primary)    2. Primary hypertension  Assessment & Plan:  Hypertension is unchanged.  Continue current treatment regimen.  Dietary sodium restriction.  Blood pressure will be reassessed at the next regular appointment.  He will continue lisinopril and metoprolol which he is tolerating well.    Orders:  -     CBC & Differential  -     Comprehensive Metabolic Panel  -     TSH    3. Pure hypercholesterolemia  Assessment & Plan:  He denies myalgias with atorvastatin which he will continue along with a low-fat, low-cholesterol diet.      Orders:  -     Lipid Panel    4. Coronary arteriosclerosis in native artery  Assessment & Plan:  Bare metal stent 11/2017  CABG x1 12/2017  Cath 12/2020: Nonobstructive CAD, widely patent stent in RCA, 2 of 2 bypass grafts patent, normal LVSF, normal LVEDP, no significant MR or AS   7/2022: echo and nuclear stress test performed without acute abnl        5. Screening for prostate cancer  -     PSA Screen    6. Need for influenza vaccination  -     Fluzone High-Dose 65+yrs      Follow Up:   Return in about 6 months (around 5/1/2023).     An After Visit Summary and PPPS were made available to the patient.

## 2022-11-08 PROBLEM — Z12.11 ENCOUNTER FOR SCREENING FOR MALIGNANT NEOPLASM OF COLON: Status: ACTIVE | Noted: 2021-08-20

## 2022-11-08 PROBLEM — Z12.11 ENCOUNTER FOR SCREENING FOR MALIGNANT NEOPLASM OF COLON: Status: RESOLVED | Noted: 2021-08-20 | Resolved: 2022-11-08

## 2022-11-08 NOTE — ASSESSMENT & PLAN NOTE
Bare metal stent 11/2017  CABG x1 12/2017  Cath 12/2020: Nonobstructive CAD, widely patent stent in RCA, 2 of 2 bypass grafts patent, normal LVSF, normal LVEDP, no significant MR or AS   7/2022: echo and nuclear stress test performed without acute abnl

## 2022-11-08 NOTE — ASSESSMENT & PLAN NOTE
Hypertension is unchanged.  Continue current treatment regimen.  Dietary sodium restriction.  Blood pressure will be reassessed at the next regular appointment.  He will continue lisinopril and metoprolol which he is tolerating well.

## 2023-04-03 DIAGNOSIS — J34.89 SINUS DRAINAGE: ICD-10-CM

## 2023-04-03 RX ORDER — SERTRALINE HYDROCHLORIDE 100 MG/1
TABLET, FILM COATED ORAL
Qty: 45 TABLET | Refills: 1 | Status: SHIPPED | OUTPATIENT
Start: 2023-04-03

## 2023-05-22 ENCOUNTER — OFFICE VISIT (OUTPATIENT)
Dept: INTERNAL MEDICINE | Facility: CLINIC | Age: 73
End: 2023-05-22
Payer: MEDICARE

## 2023-05-22 VITALS
SYSTOLIC BLOOD PRESSURE: 126 MMHG | OXYGEN SATURATION: 97 % | TEMPERATURE: 98 F | BODY MASS INDEX: 29.31 KG/M2 | WEIGHT: 221.2 LBS | HEIGHT: 73 IN | DIASTOLIC BLOOD PRESSURE: 86 MMHG | HEART RATE: 72 BPM

## 2023-05-22 DIAGNOSIS — J30.9 ALLERGIC RHINITIS, UNSPECIFIED SEASONALITY, UNSPECIFIED TRIGGER: Chronic | ICD-10-CM

## 2023-05-22 DIAGNOSIS — K21.9 GASTROESOPHAGEAL REFLUX DISEASE, UNSPECIFIED WHETHER ESOPHAGITIS PRESENT: Chronic | ICD-10-CM

## 2023-05-22 DIAGNOSIS — I25.10 CORONARY ARTERIOSCLEROSIS IN NATIVE ARTERY: Chronic | ICD-10-CM

## 2023-05-22 DIAGNOSIS — I10 PRIMARY HYPERTENSION: Primary | Chronic | ICD-10-CM

## 2023-05-22 DIAGNOSIS — E78.00 PURE HYPERCHOLESTEROLEMIA: Chronic | ICD-10-CM

## 2023-05-22 PROBLEM — Z12.11 ENCOUNTER FOR SCREENING FOR MALIGNANT NEOPLASM OF COLON: Status: RESOLVED | Noted: 2021-08-20 | Resolved: 2023-05-22

## 2023-05-22 LAB
ALBUMIN SERPL-MCNC: 4.3 G/DL (ref 3.5–5.2)
ALBUMIN/GLOB SERPL: 2 G/DL
ALP SERPL-CCNC: 95 U/L (ref 39–117)
ALT SERPL-CCNC: 23 U/L (ref 1–41)
AST SERPL-CCNC: 20 U/L (ref 1–40)
BILIRUB SERPL-MCNC: 0.5 MG/DL (ref 0–1.2)
BUN SERPL-MCNC: 15 MG/DL (ref 8–23)
BUN/CREAT SERPL: 15.3 (ref 7–25)
CALCIUM SERPL-MCNC: 9.7 MG/DL (ref 8.6–10.5)
CHLORIDE SERPL-SCNC: 105 MMOL/L (ref 98–107)
CHOLEST SERPL-MCNC: 113 MG/DL (ref 0–200)
CO2 SERPL-SCNC: 28.2 MMOL/L (ref 22–29)
CREAT SERPL-MCNC: 0.98 MG/DL (ref 0.76–1.27)
EGFRCR SERPLBLD CKD-EPI 2021: 80.4 ML/MIN/1.73
ERYTHROCYTE [DISTWIDTH] IN BLOOD BY AUTOMATED COUNT: 13.6 % (ref 12.3–15.4)
GLOBULIN SER CALC-MCNC: 2.1 GM/DL
GLUCOSE SERPL-MCNC: 111 MG/DL (ref 65–99)
HCT VFR BLD AUTO: 44.5 % (ref 37.5–51)
HDLC SERPL-MCNC: 30 MG/DL (ref 40–60)
HGB BLD-MCNC: 15.2 G/DL (ref 13–17.7)
LDLC SERPL CALC-MCNC: 61 MG/DL (ref 0–100)
MCH RBC QN AUTO: 30.5 PG (ref 26.6–33)
MCHC RBC AUTO-ENTMCNC: 34.2 G/DL (ref 31.5–35.7)
MCV RBC AUTO: 89.4 FL (ref 79–97)
PLATELET # BLD AUTO: 205 10*3/MM3 (ref 140–450)
POTASSIUM SERPL-SCNC: 4.5 MMOL/L (ref 3.5–5.2)
PROT SERPL-MCNC: 6.4 G/DL (ref 6–8.5)
RBC # BLD AUTO: 4.98 10*6/MM3 (ref 4.14–5.8)
SODIUM SERPL-SCNC: 142 MMOL/L (ref 136–145)
TRIGL SERPL-MCNC: 119 MG/DL (ref 0–150)
TSH SERPL DL<=0.005 MIU/L-ACNC: 1.46 UIU/ML (ref 0.27–4.2)
VLDLC SERPL CALC-MCNC: 22 MG/DL (ref 5–40)
WBC # BLD AUTO: 6.28 10*3/MM3 (ref 3.4–10.8)

## 2023-05-22 PROCEDURE — 3079F DIAST BP 80-89 MM HG: CPT | Performed by: NURSE PRACTITIONER

## 2023-05-22 PROCEDURE — 99214 OFFICE O/P EST MOD 30 MIN: CPT | Performed by: NURSE PRACTITIONER

## 2023-05-22 PROCEDURE — 3074F SYST BP LT 130 MM HG: CPT | Performed by: NURSE PRACTITIONER

## 2023-05-22 PROCEDURE — 1159F MED LIST DOCD IN RCRD: CPT | Performed by: NURSE PRACTITIONER

## 2023-05-22 PROCEDURE — 1160F RVW MEDS BY RX/DR IN RCRD: CPT | Performed by: NURSE PRACTITIONER

## 2023-05-25 ENCOUNTER — OFFICE VISIT (OUTPATIENT)
Dept: INTERNAL MEDICINE | Facility: CLINIC | Age: 73
End: 2023-05-25
Payer: MEDICARE

## 2023-05-25 VITALS
BODY MASS INDEX: 29.1 KG/M2 | OXYGEN SATURATION: 95 % | DIASTOLIC BLOOD PRESSURE: 73 MMHG | SYSTOLIC BLOOD PRESSURE: 119 MMHG | HEART RATE: 72 BPM | TEMPERATURE: 97.8 F | WEIGHT: 219.6 LBS | HEIGHT: 73 IN

## 2023-05-25 DIAGNOSIS — R42 DIZZINESS: ICD-10-CM

## 2023-05-25 DIAGNOSIS — H61.23 IMPACTED CERUMEN OF BOTH EARS: Primary | ICD-10-CM

## 2023-05-25 RX ORDER — MECLIZINE HCL 12.5 MG/1
12.5 TABLET ORAL 3 TIMES DAILY PRN
Qty: 30 TABLET | Refills: 0 | Status: SHIPPED | OUTPATIENT
Start: 2023-05-25

## 2023-05-25 NOTE — PROGRESS NOTES
Chief Complaint  Earache, Dizziness, and Nausea     Subjective:      History of Present Illness {CC  Problem List  Visit  Diagnosis   Encounters  Notes  Medications  Labs  Result Review Imaging  Media :23}     Dwayne Keita presents to [facility] for:      History of Present Illness   This is a patient of AMBROSIO Ha.    Patient presents today complaining of earache, dizziness, nausea.  Patient states ear has felt full since Monday.  Patient feels as though dizziness is caused by ear, and nausea is caused by dizziness.  Patient has had vertigo before.  Patient was taking meclizine for his vertigo as needed.  Patient states the meclizine helps with the vertigo symptoms.  Patient denies having fever, sore throat, cough or congestion.  Patient denies vomiting.  Patient states his nausea is only when his dizziness occurs.    I have reviewed patient's medical history, any new submitted information provided by patient or medical assistant and updated medical record.      Objective:      Physical Exam  Vitals reviewed.   Constitutional:       Appearance: Normal appearance.   HENT:      Head: Normocephalic.      Right Ear: Tympanic membrane normal. Decreased hearing noted. There is impacted cerumen.      Left Ear: Decreased hearing noted. There is impacted cerumen.      Ears:     Cardiovascular:      Rate and Rhythm: Normal rate and regular rhythm.      Pulses: Normal pulses.   Lymphadenopathy:      Cervical: Cervical adenopathy present.   Skin:     General: Skin is warm and dry.      Capillary Refill: Capillary refill takes less than 2 seconds.   Neurological:      General: No focal deficit present.      Mental Status: He is alert.   Psychiatric:         Mood and Affect: Mood normal.         Behavior: Behavior normal.        Result Review  Data Reviewed:{ Labs  Result Review  Imaging  Med Tab  Media :23}                Vital Signs:   /73 (BP Location:  "Left arm, Patient Position: Sitting, Cuff Size: Large Adult)   Pulse 72   Temp 97.8 °F (36.6 °C) (Oral)   Ht 185.4 cm (73\")   Wt 99.6 kg (219 lb 9.6 oz)   SpO2 95%   BMI 28.97 kg/m²   Ear Cerumen Removal    Date/Time: 5/25/2023 1:44 PM  Performed by: Estela Carpio APRN  Authorized by: Estela Carpio APRN   Consent: Verbal consent obtained. Written consent obtained.  Consent given by: patient  Location details: left ear and right ear  Comments: Right ear was clear after flushing.  Tympanic membrane able to be visualized.  Left ear was flushed and utilized a curette to disimpact the cerumen.  Unable to see tympanic membrane.  Patient states left ear still feels full.  Patient denies ear pain.  Procedure type: instrumentation, irrigation (Used curette)      Sedation:  Patient sedated: no             Requested Prescriptions     Signed Prescriptions Disp Refills   • meclizine (ANTIVERT) 12.5 MG tablet 30 tablet 0     Sig: Take 1 tablet by mouth 3 (Three) Times a Day As Needed for Dizziness.       Routine medications provided by this office will also be refilled via pharmacy request.       Current Outpatient Medications:   •  aspirin 81 MG tablet, Take 1 tablet by mouth Daily., Disp: , Rfl:   •  atorvastatin (LIPITOR) 80 MG tablet, Take 1 tablet by mouth Daily., Disp: , Rfl:   •  Cetirizine HCl (ZyrTEC Allergy) 10 MG capsule, Take  by mouth., Disp: , Rfl:   •  cholecalciferol (VITAMIN D3) 10 MCG (400 UNIT) tablet, Take 1 tablet by mouth Daily., Disp: , Rfl:   •  diphenhydrAMINE-acetaminophen (TYLENOL PM)  MG tablet per tablet, Take 1 tablet by mouth At Night As Needed for Sleep., Disp: , Rfl:   •  isosorbide mononitrate (IMDUR) 30 MG 24 hr tablet, Take 1 tablet by mouth Daily. Dr Wade, Disp: , Rfl:   •  lisinopril (PRINIVIL,ZESTRIL) 10 MG tablet, , Disp: , Rfl:   •  meclizine (ANTIVERT) 12.5 MG tablet, Take 1 tablet by mouth 3 (Three) Times a Day As Needed for Dizziness., Disp: 30 " tablet, Rfl: 0  •  metoprolol succinate XL (TOPROL-XL) 50 MG 24 hr tablet, Take 1 tablet by mouth Daily. 1/2 TABLET DAILY, Disp: , Rfl:   •  NEXIUM 24HR 20 MG tablet delayed-release, , Disp: , Rfl:   •  Omega-3 Fatty Acids (FISH OIL PO), Take 1 capsule by mouth Daily., Disp: , Rfl:   •  sertraline (ZOLOFT) 100 MG tablet, TAKE 1/2 TABLET EVERY DAY, Disp: 45 tablet, Rfl: 1     Assessment and Plan:      Assessment and Plan {CC Problem List  Visit Diagnosis  ROS  Review (Popup)  Health Maintenance  Quality  BestPractice  Medications  SmartSets  SnapShot Encounters  Media :23}     Problem List Items Addressed This Visit    None  Visit Diagnoses     Dizziness        Relevant Medications    meclizine (ANTIVERT) 12.5 MG tablet        Patient educated on Debrox drops and allowing water in the shower to enter ear canal.  Patient educated on staying away from Q-tips.  Patient to return if fullness does not dissipate or if symptoms worsen.  If ear pain persists will order antibiotic for patient.  Spoke with patient regarding ENT as a possible referral in the future.  Patient would like to wait for referral.     Follow Up {Instructions Charge Capture  Follow-up Communications :23}     Return if symptoms worsen or fail to improve.    I spent 30 minutes caring for Dwayne on this date of service. This time includes time spent by me in the following activities: preparing for the visit, reviewing tests, obtaining and/or reviewing a separately obtained history, performing a medically appropriate examination and/or evaluation, counseling and educating the patient/family/caregiver, ordering medications, tests, or procedures, documenting information in the medical record and independently interpreting results and communicating that information with the patient/family/caregiver    Patient was given instructions and counseling regarding his condition or for health maintenance advice. Please see specific information pulled  into the AVS if appropriate.    Dianna disclaimer:   Much of this encounter note is an electronic transcription/translation of spoken language to printed text. The electronic translation of spoken language may permit erroneous, or at times, nonsensical words or phrases to be inadvertently transcribed; Although I have reviewed the note for such errors, some may still exist.     Additional Patient Counseling:       Patient Instructions   Debrox over the counter       (4) completely dependent

## 2023-05-26 ENCOUNTER — TELEPHONE (OUTPATIENT)
Dept: INTERNAL MEDICINE | Facility: CLINIC | Age: 73
End: 2023-05-26
Payer: MEDICARE

## 2023-05-26 NOTE — TELEPHONE ENCOUNTER
"Patient was in on Thursday to see Estela.  They got a lot of \"stuff\" out of his ear.  States that he is having a lot of pressure in his left ear and that he cannot hear at all from that ear. Very uncomfortable. They have called ENT and are not able to get in anytime soon.  Would like to know what they can do?  "

## 2023-05-30 ENCOUNTER — OFFICE VISIT (OUTPATIENT)
Dept: INTERNAL MEDICINE | Facility: CLINIC | Age: 73
End: 2023-05-30

## 2023-05-30 VITALS
TEMPERATURE: 98 F | OXYGEN SATURATION: 96 % | HEIGHT: 73 IN | HEART RATE: 74 BPM | WEIGHT: 223 LBS | DIASTOLIC BLOOD PRESSURE: 74 MMHG | BODY MASS INDEX: 29.55 KG/M2 | SYSTOLIC BLOOD PRESSURE: 112 MMHG

## 2023-05-30 DIAGNOSIS — H91.93 DECREASED HEARING OF BOTH EARS: Primary | ICD-10-CM

## 2023-05-30 DIAGNOSIS — H61.23 BILATERAL IMPACTED CERUMEN: ICD-10-CM

## 2023-05-30 NOTE — PROGRESS NOTES
"Chief Complaint  Ear Fullness and Decreased hearing    Subjective        Dwayne PATRICA Keita presents to [facility] due to decreased hearing with ear pressure.    History of Present Illness  He presents with his wife due to decreased hearing with ear fullness, was seen last week for similar symptoms. His ears were irrigated but he notes worsening sx with loss of hearing in his right ear. He was referred to ENT but appt is in 2-3 weeks.   He was advised to use Debrox ear drops over the weekend and is worked in today for further evaluation.      Objective   Vital Signs:  /74 (BP Location: Left arm, Patient Position: Sitting, Cuff Size: Adult)   Pulse 74   Temp 98 °F (36.7 °C) (Temporal)   Ht 185.4 cm (73\")   Wt 101 kg (223 lb)   SpO2 96%   BMI 29.42 kg/m²   Estimated body mass index is 29.42 kg/m² as calculated from the following:    Height as of this encounter: 185.4 cm (73\").    Weight as of this encounter: 101 kg (223 lb).             Physical Exam  Constitutional:       Appearance: He is well-developed. He is not ill-appearing.   HENT:      Head: Normocephalic.      Right Ear: Decreased hearing noted. There is impacted cerumen.      Left Ear: Decreased hearing noted. There is impacted cerumen.      Nose: Nose normal. No nasal deformity, mucosal edema or rhinorrhea.      Right Sinus: No maxillary sinus tenderness or frontal sinus tenderness.      Left Sinus: No maxillary sinus tenderness or frontal sinus tenderness.      Mouth/Throat:      Dentition: Normal dentition.   Eyes:      General: Lids are normal.         Right eye: No discharge.         Left eye: No discharge.      Conjunctiva/sclera: Conjunctivae normal.      Right eye: No exudate.     Left eye: No exudate.  Neck:      Thyroid: No thyroid mass or thyromegaly.      Vascular: No carotid bruit.      Trachea: Trachea normal.   Cardiovascular:      Rate and Rhythm: Regular rhythm.      Pulses: Normal pulses.      Heart " sounds: Normal heart sounds. No murmur heard.  Pulmonary:      Effort: No respiratory distress.      Breath sounds: Normal breath sounds. No decreased breath sounds, wheezing, rhonchi or rales.   Abdominal:      General: Bowel sounds are normal.      Palpations: Abdomen is soft.      Tenderness: There is no abdominal tenderness.   Musculoskeletal:      Cervical back: Normal range of motion. No edema.   Lymphadenopathy:      Head:      Right side of head: No submental, submandibular, tonsillar, preauricular, posterior auricular or occipital adenopathy.      Left side of head: No submental, submandibular, tonsillar, preauricular, posterior auricular or occipital adenopathy.   Skin:     General: Skin is warm and dry.      Nails: There is no clubbing.   Neurological:      Mental Status: He is alert.   Psychiatric:         Behavior: Behavior is cooperative.        Result Review :            Ear Cerumen Removal    Date/Time: 5/30/2023 1:45 PM  Performed by: Ivelisse Casarez APRN  Authorized by: Ivelisse Casarez APRN   Consent: Verbal consent obtained.  Risks and benefits: risks, benefits and alternatives were discussed  Ceruminolytics applied: Ceruminolytics applied prior to the procedure.  Location details: left ear and right ear  Patient tolerance: patient tolerated the procedure well with no immediate complications  Procedure type: instrumentation, irrigation           Assessment and Plan   Diagnoses and all orders for this visit:    1. Decreased hearing of both ears (Primary)  -     Cerumen Removal    2. Bilateral impacted cerumen  -     Cerumen Removal    Decreased hearing most likely due to bilateral cerumen impaction. Ears irrigated bilaterally which he tolerated well, notes improvement after ear irrigation.       Follow Up   Return if symptoms worsen or fail to improve, for Next scheduled follow up.  Patient was given instructions and counseling regarding his condition or for health maintenance advice. Please  see specific information pulled into the AVS if appropriate.

## 2023-06-09 PROBLEM — J43.8 OTHER EMPHYSEMA: Chronic | Status: RESOLVED | Noted: 2021-04-13 | Resolved: 2023-06-09

## 2023-06-09 NOTE — ASSESSMENT & PLAN NOTE
BP is well-controlled on lisinopril and metoprolol which he will continue along with a low sodium diet.

## 2023-06-09 NOTE — PROGRESS NOTES
"Chief Complaint  Hypertension (6 month follow up), Hyperlipidemia, and Heartburn    Subjective        Dwayne Keita presents to [facility] for f/u regarding HTN, GERD and hypercholesterolemia.    Hypertension      Objective   Vital Signs:  /86 (BP Location: Left arm, Patient Position: Sitting, Cuff Size: Adult)   Pulse 72   Temp 98 °F (36.7 °C) (Temporal)   Ht 185.4 cm (73\")   Wt 100 kg (221 lb 3.2 oz)   SpO2 97%   BMI 29.18 kg/m²   Estimated body mass index is 29.18 kg/m² as calculated from the following:    Height as of this encounter: 185.4 cm (73\").    Weight as of this encounter: 100 kg (221 lb 3.2 oz).             Physical Exam  Constitutional:       Appearance: He is well-developed. He is not ill-appearing.   HENT:      Head: Normocephalic.      Right Ear: There is impacted cerumen.      Left Ear: There is impacted cerumen.      Nose: Nose normal. No nasal deformity, mucosal edema or rhinorrhea.      Right Sinus: No maxillary sinus tenderness or frontal sinus tenderness.      Left Sinus: No maxillary sinus tenderness or frontal sinus tenderness.      Mouth/Throat:      Dentition: Normal dentition.   Eyes:      General: Lids are normal.         Right eye: No discharge.         Left eye: No discharge.      Conjunctiva/sclera: Conjunctivae normal.      Right eye: No exudate.     Left eye: No exudate.  Neck:      Thyroid: No thyroid mass or thyromegaly.      Vascular: No carotid bruit.      Trachea: Trachea normal.   Cardiovascular:      Rate and Rhythm: Regular rhythm.      Pulses: Normal pulses.      Heart sounds: Normal heart sounds. No murmur heard.  Pulmonary:      Effort: No respiratory distress.      Breath sounds: Normal breath sounds. No decreased breath sounds, wheezing, rhonchi or rales.   Abdominal:      General: Bowel sounds are normal.      Palpations: Abdomen is soft.      Tenderness: There is no abdominal tenderness.   Musculoskeletal:      Cervical " back: Normal range of motion. No edema.   Lymphadenopathy:      Head:      Right side of head: No submental, submandibular, tonsillar, preauricular, posterior auricular or occipital adenopathy.      Left side of head: No submental, submandibular, tonsillar, preauricular, posterior auricular or occipital adenopathy.   Skin:     General: Skin is warm and dry.      Nails: There is no clubbing.   Neurological:      Mental Status: He is alert.   Psychiatric:         Behavior: Behavior is cooperative.      Result Review :  The following data was reviewed by: AMBROSIO Chance on 05/22/2023:  Common labs          11/1/2022    09:27 5/22/2023    11:53   Common Labs   Glucose 106  111    BUN 12  15    Creatinine 1.03  0.98    Sodium 140  142    Potassium 4.2  4.5    Chloride 104  105    Calcium 10.1  9.7    Total Protein 6.5  6.4    Albumin 4.20  4.3    Total Bilirubin 0.6  0.5    Alkaline Phosphatase 102  95    AST (SGOT) 25  20    ALT (SGPT) 26  23    WBC 5.85  6.28    Hemoglobin 14.9  15.2    Hematocrit 44.1  44.5    Platelets 210  205    Total Cholesterol 115  113    Triglycerides 154  119    HDL Cholesterol 30  30    LDL Cholesterol  58  61    PSA 0.600                    Assessment and Plan   Diagnoses and all orders for this visit:    1. Primary hypertension (Primary)  Assessment & Plan:  BP is well-controlled on lisinopril and metoprolol which he will continue along with a low sodium diet.    Orders:  -     CBC (No Diff)  -     Comprehensive Metabolic Panel  -     TSH    2. Pure hypercholesterolemia  Assessment & Plan:  He denies myalgias with atorvastatin which he will continue along with a low-fat, low-cholesterol diet.   Lab Results   Component Value Date    LDL 61 05/22/2023       Orders:  -     Lipid Panel    3. Allergic rhinitis, unspecified seasonality, unspecified trigger  Assessment & Plan:  Sx managed with Zyrtec daily      4. Gastroesophageal reflux disease, unspecified whether esophagitis  present  Assessment & Plan:  He takes Nexium daily with good control of sx, denies abdominal pain.      5. Coronary arteriosclerosis in native artery  Assessment & Plan:  Bare metal stent 11/2017  CABG x1 12/2017  Cath 12/2020: Nonobstructive CAD, widely patent stent in RCA, 2 of 2 bypass grafts patent, normal LVSF, normal LVEDP, no significant MR or AS   7/2022: echo and nuclear stress test performed without acute abnl  Managed on statin therapy, asa and beta blocker.               Follow Up   Return in about 6 months (around 11/22/2023) for Wellness.  Patient was given instructions and counseling regarding his condition or for health maintenance advice. Please see specific information pulled into the AVS if appropriate.

## 2023-06-09 NOTE — ASSESSMENT & PLAN NOTE
He denies myalgias with atorvastatin which he will continue along with a low-fat, low-cholesterol diet.   Lab Results   Component Value Date    LDL 61 05/22/2023

## 2023-06-09 NOTE — ASSESSMENT & PLAN NOTE
Bare metal stent 11/2017  CABG x1 12/2017  Cath 12/2020: Nonobstructive CAD, widely patent stent in RCA, 2 of 2 bypass grafts patent, normal LVSF, normal LVEDP, no significant MR or AS   7/2022: echo and nuclear stress test performed without acute abnl  Managed on statin therapy, asa and beta blocker.

## 2023-08-02 ENCOUNTER — TELEPHONE (OUTPATIENT)
Dept: INTERNAL MEDICINE | Facility: CLINIC | Age: 73
End: 2023-08-02
Payer: MEDICARE

## 2023-08-02 NOTE — TELEPHONE ENCOUNTER
Between knee and hip in the front of right leg was numb and symptoms have been getting worse.    Feels like knee clicked/went out of place and since then it's been bothering him.

## 2023-08-02 NOTE — TELEPHONE ENCOUNTER
Yesterday pt was having numbness in leg that lasted for around an hour. Pt still having pain in leg but no longer number.    Please advise.

## 2023-08-02 NOTE — TELEPHONE ENCOUNTER
Patient states he has been having pain in his knee, it pops when he walks. Patient states he walked a lot yesterday and had a pain above his knee and while he was walking it became numb after popping.     Patient scheduled.

## 2023-08-04 ENCOUNTER — OFFICE VISIT (OUTPATIENT)
Dept: INTERNAL MEDICINE | Facility: CLINIC | Age: 73
End: 2023-08-04
Payer: MEDICARE

## 2023-08-04 ENCOUNTER — HOSPITAL ENCOUNTER (OUTPATIENT)
Dept: GENERAL RADIOLOGY | Facility: HOSPITAL | Age: 73
Discharge: HOME | End: 2023-08-04
Admitting: NURSE PRACTITIONER
Payer: MEDICARE

## 2023-08-04 VITALS
WEIGHT: 226.4 LBS | DIASTOLIC BLOOD PRESSURE: 76 MMHG | TEMPERATURE: 97.7 F | BODY MASS INDEX: 30 KG/M2 | HEART RATE: 64 BPM | OXYGEN SATURATION: 97 % | HEIGHT: 73 IN | SYSTOLIC BLOOD PRESSURE: 136 MMHG

## 2023-08-04 DIAGNOSIS — M25.561 LATERAL KNEE PAIN, RIGHT: Primary | ICD-10-CM

## 2023-08-04 PROCEDURE — 99213 OFFICE O/P EST LOW 20 MIN: CPT | Performed by: NURSE PRACTITIONER

## 2023-08-04 PROCEDURE — 73560 X-RAY EXAM OF KNEE 1 OR 2: CPT

## 2023-08-04 RX ORDER — MELOXICAM 15 MG/1
15 TABLET ORAL DAILY
Qty: 30 TABLET | Refills: 0 | Status: SHIPPED | OUTPATIENT
Start: 2023-08-04

## 2023-08-04 RX ORDER — NITROGLYCERIN 0.4 MG/1
1 TABLET SUBLINGUAL
COMMUNITY
Start: 2023-06-27

## 2023-08-28 ENCOUNTER — PATIENT OUTREACH (OUTPATIENT)
Dept: OTHER | Facility: HOSPITAL | Age: 73
End: 2023-08-28
Payer: MEDICARE

## 2023-08-28 ENCOUNTER — TELEPHONE (OUTPATIENT)
Dept: INTERNAL MEDICINE | Facility: CLINIC | Age: 73
End: 2023-08-28
Payer: MEDICARE

## 2023-08-28 DIAGNOSIS — Z87.891 HISTORY OF TOBACCO USE: Primary | ICD-10-CM

## 2023-08-28 NOTE — TELEPHONE ENCOUNTER
Caller: Carlota Cabrera    Relationship: Emergency Contact    Best call back number: [phone]    What is the best time to reach you: ANYTIME     Who are you requesting to speak with (clinical staff, provider,  specific staff member): CLINICAL STAFF     What was the call regarding: PATIENTS WIFE STATES THAT THEY RECEIVED A LETTER ABOUT BEING DUE FOR AN ANNUAL LUNG CANCER SCREENING AND WOULD LIKE TO KNOW IF THAT IS SOMETHING THE PATIENT NEEDS  THIS YEAR.     PLEASE ADVISE

## 2023-08-28 NOTE — PROGRESS NOTES
Call from patient's wife after receiving overdue letter. Explained the usual frequency of LCDT scans based on smoking history; his last scan was 6/2022. They will discuss with Ivelisse VALENTE, who would need to order a repeat LDCT.  The scheduling department would call them for an appt once an order is rec'd. The wife verbalized understanding.

## 2023-09-20 DIAGNOSIS — M25.561 LATERAL KNEE PAIN, RIGHT: ICD-10-CM

## 2023-09-21 RX ORDER — MELOXICAM 15 MG/1
TABLET ORAL
Qty: 30 TABLET | Refills: 0 | Status: SHIPPED | OUTPATIENT
Start: 2023-09-21

## 2023-09-28 ENCOUNTER — HOSPITAL ENCOUNTER (OUTPATIENT)
Dept: CT IMAGING | Facility: HOSPITAL | Age: 73
Discharge: HOME | End: 2023-09-28
Admitting: NURSE PRACTITIONER
Payer: MEDICARE

## 2023-09-28 DIAGNOSIS — Z87.891 HISTORY OF TOBACCO USE: ICD-10-CM

## 2023-09-28 PROCEDURE — 71271 CT THORAX LUNG CANCER SCR C-: CPT

## 2023-10-21 DIAGNOSIS — M25.561 LATERAL KNEE PAIN, RIGHT: ICD-10-CM

## 2023-10-23 RX ORDER — MELOXICAM 15 MG/1
15 TABLET ORAL DAILY
Qty: 30 TABLET | Refills: 1 | Status: SHIPPED | OUTPATIENT
Start: 2023-10-23

## 2023-12-11 ENCOUNTER — OFFICE VISIT (OUTPATIENT)
Dept: INTERNAL MEDICINE | Facility: CLINIC | Age: 73
End: 2023-12-11
Payer: MEDICARE

## 2023-12-11 VITALS
WEIGHT: 230.2 LBS | SYSTOLIC BLOOD PRESSURE: 152 MMHG | BODY MASS INDEX: 30.51 KG/M2 | OXYGEN SATURATION: 95 % | HEIGHT: 73 IN | HEART RATE: 65 BPM | DIASTOLIC BLOOD PRESSURE: 82 MMHG

## 2023-12-11 DIAGNOSIS — I25.10 CORONARY ARTERIOSCLEROSIS IN NATIVE ARTERY: Chronic | ICD-10-CM

## 2023-12-11 DIAGNOSIS — Z12.5 SCREENING FOR PROSTATE CANCER: ICD-10-CM

## 2023-12-11 DIAGNOSIS — Z23 NEED FOR INFLUENZA VACCINATION: ICD-10-CM

## 2023-12-11 DIAGNOSIS — E78.00 PURE HYPERCHOLESTEROLEMIA: Chronic | ICD-10-CM

## 2023-12-11 DIAGNOSIS — K21.9 GASTROESOPHAGEAL REFLUX DISEASE, UNSPECIFIED WHETHER ESOPHAGITIS PRESENT: Chronic | ICD-10-CM

## 2023-12-11 DIAGNOSIS — I10 PRIMARY HYPERTENSION: Chronic | ICD-10-CM

## 2023-12-11 DIAGNOSIS — Z00.00 MEDICARE ANNUAL WELLNESS VISIT, SUBSEQUENT: Primary | ICD-10-CM

## 2023-12-11 NOTE — PATIENT INSTRUCTIONS
Medicare Wellness  Personal Prevention Plan of Service     Date of Office Visit:    Encounter Provider:  AMBROSIO Chance  Place of Service:  [facility]  Patient Name: Dwayne Keita  :  1950    As part of the Medicare Wellness portion of your visit today, we are providing you with this personalized preventive plan of services (PPPS). This plan is based upon recommendations of the [place] Preventive Services Task Force (USPSTF) and the Advisory Committee on Immunization Practices (ACIP).    This lists the preventive care services that should be considered, and provides dates of when you are due. Items listed as completed are up-to-date and do not require any further intervention.    Health Maintenance   Topic Date Due    ZOSTER VACCINE (1 of 2) Never done    COVID-19 Vaccine (2023-24 season) 2023    ANNUAL WELLNESS VISIT  2023    TDAP/TD VACCINES (4 - Td or Tdap) 2029 (Originally 2/3/2029)    LIPID PANEL  2024    BMI FOLLOWUP  2024    COLORECTAL CANCER SCREENING  2025    HEPATITIS C SCREENING  Completed    INFLUENZA VACCINE  Completed    Pneumococcal Vaccine 65+  Completed    AAA SCREEN (ONE-TIME)  Completed       Orders Placed This Encounter   Procedures    Fluzone High-Dose 65+yrs    CBC (No Diff)     Order Specific Question:   Release to patient     Answer:   Routine Release [[id number]]    Comprehensive Metabolic Panel     Order Specific Question:   Release to patient     Answer:   Routine Release [[id number]]    Lipid Panel     Order Specific Question:   Release to patient     Answer:   Routine Release [[id number]]    TSH     Order Specific Question:   Release to patient     Answer:   Routine Release [[id number]]    PSA Screen     Order Specific Question:   Release to patient     Answer:   Routine Release [[id number]]       Return in about 6 months (around 2024).

## 2023-12-11 NOTE — PROGRESS NOTES
The ABCs of the Annual Wellness Visit  Subsequent Medicare Wellness Visit    Subjective    Dwayne Keita is a 73 y.o. male who presents for a Subsequent Medicare Wellness Visit.    The following portions of the patient's history were reviewed and   updated as appropriate: allergies, current medications, past family history, past medical history, past social history, past surgical history, and problem list.    Compared to one year ago, the patient feels his physical   health is the same.    Compared to one year ago, the patient feels his mental   health is the same.        Recent Hospitalizations:  He was not admitted to the hospital during the last year.       Current Medical Providers:  Patient Care Team:  Ivelisse Casarez APRN as PCP - General (Internal Medicine)    Outpatient Medications Prior to Visit   Medication Sig Dispense Refill    acetaminophen (TYLENOL) 500 MG tablet Take 1 tablet by mouth Every 6 (Six) Hours As Needed for Mild Pain. 30 tablet 0    aspirin 81 MG tablet Take 1 tablet by mouth Daily.      atorvastatin (LIPITOR) 80 MG tablet Take 1 tablet by mouth Daily.      Cetirizine HCl (ZyrTEC Allergy) 10 MG capsule Take  by mouth.      cholecalciferol (VITAMIN D3) 10 MCG (400 UNIT) tablet Take 1 tablet by mouth Daily.      diphenhydrAMINE-acetaminophen (TYLENOL PM)  MG tablet per tablet Take 1 tablet by mouth At Night As Needed for Sleep.      isosorbide mononitrate (IMDUR) 30 MG 24 hr tablet Take 1 tablet by mouth Daily. Dr Wade      lisinopril (PRINIVIL,ZESTRIL) 10 MG tablet Take 2 tablets by mouth Daily.      meclizine (ANTIVERT) 12.5 MG tablet Take 1 tablet by mouth 3 (Three) Times a Day As Needed for Dizziness. 30 tablet 0    meloxicam (MOBIC) 15 MG tablet TAKE 1 TABLET BY MOUTH EVERY DAY WITH FOOD 30 tablet 1    metoprolol succinate XL (TOPROL-XL) 50 MG 24 hr tablet Take 1 tablet by mouth Daily. 1/2 TABLET DAILY      NEXIUM 24HR 20 MG tablet delayed-release       nitroglycerin  "(NITROSTAT) 0.4 MG SL tablet Place 1 tablet under the tongue Every 5 (Five) Minutes As Needed.      Omega-3 Fatty Acids (FISH OIL PO) Take 1 capsule by mouth Daily.      guaifenesin-dextromethorphan (MUCINEX DM)  MG tablet sustained-release 12 hour tablet Take 1 tablet by mouth 2 (Two) Times a Day As Needed (cough). 24 tablet 0    benzonatate (TESSALON) 100 MG capsule Take 1 capsule by mouth 3 (Three) Times a Day As Needed for Cough. (Patient not taking: Reported on 12/11/2023) 30 capsule 0    Nirmatrelvir&Ritonavir 300/100 (PAXLOVID) 20 x 150 MG & 10 x 100MG tablet therapy pack tablet Take 3 tablets by mouth 2 (Two) Times a Day. GFR greater than 80 (Patient not taking: Reported on 12/11/2023) 30 tablet 0     No facility-administered medications prior to visit.       No opioid medication identified on active medication list. I have reviewed chart for other potential  high risk medication/s and harmful drug interactions in the elderly.        Aspirin is on active medication list. Aspirin use is indicated based on review of current medical condition/s. Pros and cons of this therapy have been discussed today. Benefits of this medication outweigh potential harm.  Patient has been encouraged to continue taking this medication.  .      Patient Active Problem List   Diagnosis    Benign positional vertigo    Hypertension    Hyperlipidemia    Coronary arteriosclerosis in native artery    GERD (gastroesophageal reflux disease)    Allergic rhinitis    Colon polyps     Advance Care Planning   Advance Care Planning     Advance Directive is on file.  ACP discussion was held with the patient during this visit. Patient has an advance directive in EMR which is still valid.      Objective    Vitals:    12/11/23 0741   BP: 152/82   BP Location: Left arm   Patient Position: Sitting   Cuff Size: Large Adult   Pulse: 65   SpO2: 95%   Weight: 104 kg (230 lb 3.2 oz)   Height: 185.4 cm (73\")   PainSc: 0-No pain     Estimated body mass " "index is 30.37 kg/m² as calculated from the following:    Height as of this encounter: 185.4 cm (73\").    Weight as of this encounter: 104 kg (230 lb 3.2 oz).    BMI is >= 30 and <35. (Class 1 Obesity). The following options were offered after discussion;: exercise counseling/recommendations and nutrition counseling/recommendations      Does the patient have evidence of cognitive impairment? No          HEALTH RISK ASSESSMENT    Smoking Status:  Social History     Tobacco Use   Smoking Status Former    Packs/day: 1.50    Years: 45.00    Additional pack years: 0.00    Total pack years: 67.50    Types: Cigarettes    Quit date:     Years since quitting: 15.9    Passive exposure: Never   Smokeless Tobacco Never     Alcohol Consumption:  Social History     Substance and Sexual Activity   Alcohol Use No     Fall Risk Screen:    LINDAADI Fall Risk Assessment was completed, and patient is at LOW risk for falls.Assessment completed on:2023    Depression Screenin/11/2023     7:51 AM   PHQ-2/PHQ-9 Depression Screening   Little Interest or Pleasure in Doing Things 0-->not at all   Feeling Down, Depressed or Hopeless 0-->not at all   PHQ-9: Brief Depression Severity Measure Score 0       Health Habits and Functional and Cognitive Screenin/11/2023     7:51 AM   Functional & Cognitive Status   Do you have difficulty preparing food and eating? No   Do you have difficulty bathing yourself, getting dressed or grooming yourself? No   Do you have difficulty using the toilet? No   Do you have difficulty moving around from place to place? No   Do you have trouble with steps or getting out of a bed or a chair? No   Current Diet Well Balanced Diet   Dental Exam Up to date   Eye Exam Up to date   Exercise (times per week) 0 times per week   Current Exercises Include No Regular Exercise   Do you need help using the phone?  No   Are you deaf or do you have serious difficulty hearing?  Yes   Do you need help to go " to places out of walking distance? No   Do you need help shopping? No   Do you need help preparing meals?  No   Do you need help with housework?  No   Do you need help with laundry? No   Do you need help taking your medications? No   Do you need help managing money? No   Do you ever drive or ride in a car without wearing a seat belt? No   Have you felt unusual stress, anger or loneliness in the last month? No   Who do you live with? Spouse   If you need help, do you have trouble finding someone available to you? No   Have you been bothered in the last four weeks by sexual problems? No   Do you have difficulty concentrating, remembering or making decisions? No       Age-appropriate Screening Schedule:  Refer to the list below for future screening recommendations based on patient's age, sex and/or medical conditions. Orders for these recommended tests are listed in the plan section. The patient has been provided with a written plan.    Health Maintenance   Topic Date Due    ZOSTER VACCINE (1 of 2) Never done    COVID-19 Vaccine (7 - 2023-24 season) 09/01/2023    ANNUAL WELLNESS VISIT  11/01/2023    BMI FOLLOWUP  11/01/2023    TDAP/TD VACCINES (4 - Td or Tdap) 07/17/2029 (Originally 2/3/2029)    LIPID PANEL  06/13/2024    COLORECTAL CANCER SCREENING  07/01/2025    HEPATITIS C SCREENING  Completed    INFLUENZA VACCINE  Completed    Pneumococcal Vaccine 65+  Completed    AAA SCREEN (ONE-TIME)  Completed                  CMS Preventative Services Quick Reference  Risk Factors Identified During Encounter  Immunizations Discussed/Encouraged: Shingrix and COVID19  The above risks/problems have been discussed with the patient.  Pertinent information has been shared with the patient in the After Visit Summary.  An After Visit Summary and PPPS were made available to the patient.    Follow Up:   Next Medicare Wellness visit to be scheduled in 1 year.       Additional E&M Note during same encounter follows:  Patient has multiple  medical problems which are significant and separately identifiable that require additional work above and beyond the Medicare Wellness Visit.      Chief Complaint  Medicare Wellness-subsequent, Hypertension, Hyperlipidemia, and Coronary Artery Disease    Subjective        HPI    The patient is a 73-year-old male who presents today for f/u regarding HTN, hypercholesterolemia and CAD. He is accompanied by his wife Diane.    Patient was COVID-19 positive in 11/2023. Symptoms included a cough, fever of 101-degree Fahrenheit, and severe headache. Patient was seen at urgent care on 11/11/2023. He was given Paxlovid, but he had a metallic taste in his mouth so he quit taking it early. He has been doing well since then. His energy level is back to normal. Denies dizziness, lightheadedness or headaches. His health is about the same compared to a year ago.  Patient is due for an influenza and shingles vaccine.     He takes Tylenol PM for sleep with good results.    A CT scan for lung cancer screening was obtained in 09/2023 that showed emphysema, lung granulomas and cardiac atherosclerosis. Patient is a previous smoker. He reports dyspnea that is not a new symptom.     He reports intermittent heartburn and indigestion, but it is not severe. Symptoms are exacerbated by certain foods. Bowel movements are regular. Denies any blood in his stool.     Patient reports neck and back pain.CT chest noted multilevel degenerative changes in his back.     He saw cardiology at [facility] in 06/2023. He reports chest pain in 06/2023 and a PET myocardial perfusion imaging was obtained without abnormal findings. Lisinopril was increased to 20 mg. Continues to see Marci Pedroza at the lipid clinic.    Review of Systems   Constitutional:  Negative for fatigue and fever.   HENT:  Positive for congestion and postnasal drip.    Respiratory:  Positive for shortness of breath. Negative for cough and chest tightness.    Cardiovascular:  Negative for  "chest pain, palpitations and leg swelling.   Gastrointestinal:  Negative for blood in stool, constipation and diarrhea.   Musculoskeletal:  Positive for back pain and neck pain.   Neurological:  Negative for dizziness and light-headedness.       Objective   Vital Signs:  /82 (BP Location: Left arm, Patient Position: Sitting, Cuff Size: Large Adult)   Pulse 65   Ht 185.4 cm (73\")   Wt 104 kg (230 lb 3.2 oz)   SpO2 95%   BMI 30.37 kg/m²     Physical Exam  Constitutional:       General: He is not in acute distress.     Appearance: Normal appearance. He is not diaphoretic.   HENT:      Head: Normocephalic and atraumatic.      Right Ear: Tympanic membrane, ear canal and external ear normal.      Left Ear: Tympanic membrane, ear canal and external ear normal.      Nose: Nose normal. No rhinorrhea.      Mouth/Throat:      Mouth: Mucous membranes are moist.      Pharynx: Oropharynx is clear.   Eyes:      General:         Right eye: No discharge.         Left eye: No discharge.      Conjunctiva/sclera: Conjunctivae normal.   Cardiovascular:      Rate and Rhythm: Normal rate and regular rhythm.      Pulses: Normal pulses.      Heart sounds: Normal heart sounds.   Pulmonary:      Effort: Pulmonary effort is normal.      Breath sounds: Normal breath sounds.   Abdominal:      General: Bowel sounds are normal.      Tenderness: There is no abdominal tenderness.   Musculoskeletal:         General: No swelling or tenderness.      Cervical back: Normal range of motion.   Skin:     General: Skin is warm and dry.   Neurological:      General: No focal deficit present.      Mental Status: He is alert and oriented to person, place, and time.   Psychiatric:         Mood and Affect: Mood normal.         Behavior: Behavior normal.         Judgment: Judgment normal.     BMI is >= 30 and <35. (Class 1 Obesity). The following options were offered after discussion;: exercise counseling/recommendations and nutrition " counseling/recommendations       The following data was reviewed by: AMBROSIO Chance on 12/11/2023:  Common labs          5/22/2023    11:53   Common Labs   Glucose 111    BUN 15    Creatinine 0.98    Sodium 142    Potassium 4.5    Chloride 105    Calcium 9.7    Total Protein 6.4    Albumin 4.3    Total Bilirubin 0.5    Alkaline Phosphatase 95    AST (SGOT) 20    ALT (SGPT) 23    WBC 6.28    Hemoglobin 15.2    Hematocrit 44.5    Platelets 205    Total Cholesterol 113    Triglycerides 119    HDL Cholesterol 30    LDL Cholesterol  61      Data reviewed : Radiologic studies CT chest 9/28/23           Assessment and Plan   Diagnoses and all orders for this visit:    1. Medicare annual wellness visit, subsequent (Primary)    2. Primary hypertension  Assessment & Plan:  Blood pressures is stable on lisinopril and metoprolol which he will continue along with a low sodium diet.    Orders:  -     CBC (No Diff)  -     Comprehensive Metabolic Panel  -     TSH    3. Pure hypercholesterolemia  Assessment & Plan:  He denies myalgias with atorvastatin which he will continue along with a low-fat and low-cholesterol diet.     Orders:  -     Lipid Panel    4. Coronary arteriosclerosis in native artery  Assessment & Plan:  Managed on statin therapy, ASA and beta blocker. Continue to follow cardiology as scheduled.       5. Gastroesophageal reflux disease, unspecified whether esophagitis present  Assessment & Plan:  He will continue Nexium daily.      6. Screening for prostate cancer  -     PSA Screen    7. Need for influenza vaccination  -     Fluzone High-Dose 65+yrs             Follow Up   Return in about 6 months (around 6/11/2024).  Patient was given instructions and counseling regarding his condition or for health maintenance advice. Please see specific information pulled into the AVS if appropriate.         Transcribed from ambient dictation for AMBROSIO Chance by Maria C Stanford.  12/11/23   09:58 EST    Patient  or patient representative verbalized consent to the visit recording.  I have personally performed the services described in this document as transcribed by the above individual, and it is both accurate and complete.

## 2023-12-11 NOTE — ASSESSMENT & PLAN NOTE
He denies myalgias with atorvastatin which he will continue along with a low-fat and low-cholesterol diet.

## 2023-12-11 NOTE — ASSESSMENT & PLAN NOTE
Blood pressures is stable on lisinopril and metoprolol which he will continue along with a low sodium diet.

## 2023-12-13 LAB
ALBUMIN SERPL-MCNC: 4.4 G/DL (ref 3.5–5.2)
ALBUMIN/GLOB SERPL: 2.2 G/DL
ALP SERPL-CCNC: 88 U/L (ref 39–117)
ALT SERPL-CCNC: 20 U/L (ref 1–41)
AST SERPL-CCNC: 19 U/L (ref 1–40)
BILIRUB SERPL-MCNC: 0.5 MG/DL (ref 0–1.2)
BUN SERPL-MCNC: 13 MG/DL (ref 8–23)
BUN/CREAT SERPL: 14.4 (ref 7–25)
CALCIUM SERPL-MCNC: 9.8 MG/DL (ref 8.6–10.5)
CHLORIDE SERPL-SCNC: 109 MMOL/L (ref 98–107)
CHOLEST SERPL-MCNC: 129 MG/DL (ref 0–200)
CO2 SERPL-SCNC: 28.7 MMOL/L (ref 22–29)
CREAT SERPL-MCNC: 0.9 MG/DL (ref 0.76–1.27)
EGFRCR SERPLBLD CKD-EPI 2021: 89.1 ML/MIN/1.73
ERYTHROCYTE [DISTWIDTH] IN BLOOD BY AUTOMATED COUNT: 14 % (ref 12.3–15.4)
GLOBULIN SER CALC-MCNC: 2 GM/DL
GLUCOSE SERPL-MCNC: 106 MG/DL (ref 65–99)
HCT VFR BLD AUTO: 45.2 % (ref 37.5–51)
HDLC SERPL-MCNC: 29 MG/DL (ref 40–60)
HGB BLD-MCNC: 14.9 G/DL (ref 13–17.7)
LDLC SERPL CALC-MCNC: 71 MG/DL (ref 0–100)
MCH RBC QN AUTO: 29.9 PG (ref 26.6–33)
MCHC RBC AUTO-ENTMCNC: 33 G/DL (ref 31.5–35.7)
MCV RBC AUTO: 90.8 FL (ref 79–97)
PLATELET # BLD AUTO: 194 10*3/MM3 (ref 140–450)
POTASSIUM SERPL-SCNC: 4.3 MMOL/L (ref 3.5–5.2)
PROT SERPL-MCNC: 6.4 G/DL (ref 6–8.5)
PSA SERPL-MCNC: 0.45 NG/ML (ref 0–4)
RBC # BLD AUTO: 4.98 10*6/MM3 (ref 4.14–5.8)
SODIUM SERPL-SCNC: 146 MMOL/L (ref 136–145)
TRIGL SERPL-MCNC: 168 MG/DL (ref 0–150)
TSH SERPL DL<=0.005 MIU/L-ACNC: 2.44 UIU/ML (ref 0.27–4.2)
VLDLC SERPL CALC-MCNC: 29 MG/DL (ref 5–40)
WBC # BLD AUTO: 6.18 10*3/MM3 (ref 3.4–10.8)

## 2024-01-17 ENCOUNTER — PATIENT MESSAGE (OUTPATIENT)
Dept: INTERNAL MEDICINE | Facility: CLINIC | Age: 74
End: 2024-01-17
Payer: MEDICARE

## 2024-01-17 NOTE — TELEPHONE ENCOUNTER
Per med list you do not fill any of these medications, okay to take over or should patient reach out to original prescriber?

## 2024-01-17 NOTE — TELEPHONE ENCOUNTER
From: Dwayne Keita  To: Ivelisse Casarez  Sent: 1/17/2024 8:57 AM EST  Subject: Need to refill prescr. cant add new pharmacy     Good morning , we have a new mail in Pharmacy   need Sertraline 100 mg tab 1/2 everyday   metoprolol ER ( succ) 50 mg tab   isosorbide Mono ER 30 mg tab  Lisinopril 10MG tab and   Atorvastatin 80 MG tab night    [email or web address]   [phone] phone or   fax [phone]   MS. Casarez did not prescribe all of the above , please let me know if we need to request those from original   prescriber .   thank you   Diane Keita ( Isaac's Wife) [phone] i could not add new pharmacy .

## 2024-01-19 NOTE — TELEPHONE ENCOUNTER
The sertraline is not on his medication list, it was discontinued in 2019. Will you contact patient and clarify if he is taking?

## 2024-01-19 NOTE — TELEPHONE ENCOUNTER
Attempted to call patient, LVM advising call back    HUB TO READ  In regard to your message about the new prescription - you requested Sertraline (Zoloft) but we do not have this on your medication list and it looks like it hasn't been filled by our office for 5 years. Are you still taking this medication? If so, who is currently filling it for you?

## 2024-01-23 ENCOUNTER — TELEPHONE (OUTPATIENT)
Dept: INTERNAL MEDICINE | Facility: CLINIC | Age: 74
End: 2024-01-23
Payer: MEDICARE

## 2024-01-23 NOTE — TELEPHONE ENCOUNTER
Caller: Carlota Cabrera    Relationship: Emergency Contact    Best call back number:[phone]    What is the best time to reach you: ANYTIME    Who are you requesting to speak with (clinical staff, provider,  specific staff member): CLINICAL    What was the call regarding:PATIENT WAS CALLING BACK REGARDING ZOLOFT MEDICATION, PATIENT HAS STILL BEEN TAKING 100 MG 1/2 TABLET 1 TIME DAILY. PATIENT STATED THAT THEY HAVE BEEN GETTING IT THROUGH THE [facility] FROM [place], PATIENT STATED IT SAYS HER NAME ON THE BOTTLES, BUT THEY JUST RAN OUT OF REFILLS.     PATIENT WAS ALSO WONDERING IF HE SHOULD STILL BE TAKING THIS, OR WHAT THE BEST SOLUTION SHOULD BE.

## 2024-01-24 RX ORDER — ISOSORBIDE MONONITRATE 30 MG/1
30 TABLET, EXTENDED RELEASE ORAL DAILY
Qty: 90 TABLET | Refills: 1 | Status: SHIPPED | OUTPATIENT
Start: 2024-01-24

## 2024-01-24 RX ORDER — METOPROLOL SUCCINATE 50 MG/1
TABLET, EXTENDED RELEASE ORAL
Qty: 90 TABLET | Refills: 1 | Status: SHIPPED | OUTPATIENT
Start: 2024-01-24

## 2024-01-24 RX ORDER — LISINOPRIL 10 MG/1
20 TABLET ORAL DAILY
Qty: 90 TABLET | Refills: 1 | Status: SHIPPED | OUTPATIENT
Start: 2024-01-24

## 2024-01-24 RX ORDER — SERTRALINE HYDROCHLORIDE 100 MG/1
50 TABLET, FILM COATED ORAL DAILY
Qty: 45 TABLET | Refills: 1 | Status: SHIPPED | OUTPATIENT
Start: 2024-01-24

## 2024-01-24 RX ORDER — ATORVASTATIN CALCIUM 80 MG/1
80 TABLET, FILM COATED ORAL DAILY
Qty: 90 TABLET | Refills: 1 | Status: SHIPPED | OUTPATIENT
Start: 2024-01-24

## 2024-06-17 ENCOUNTER — OFFICE VISIT (OUTPATIENT)
Dept: INTERNAL MEDICINE | Facility: CLINIC | Age: 74
End: 2024-06-17
Payer: MEDICARE

## 2024-06-17 VITALS
HEIGHT: 73 IN | SYSTOLIC BLOOD PRESSURE: 138 MMHG | BODY MASS INDEX: 30.03 KG/M2 | OXYGEN SATURATION: 96 % | DIASTOLIC BLOOD PRESSURE: 84 MMHG | HEART RATE: 68 BPM | WEIGHT: 226.6 LBS | TEMPERATURE: 97.1 F

## 2024-06-17 DIAGNOSIS — I25.10 CORONARY ARTERIOSCLEROSIS IN NATIVE ARTERY: Chronic | ICD-10-CM

## 2024-06-17 DIAGNOSIS — E78.00 PURE HYPERCHOLESTEROLEMIA: Chronic | ICD-10-CM

## 2024-06-17 DIAGNOSIS — R06.83 SNORING: ICD-10-CM

## 2024-06-17 DIAGNOSIS — K21.9 GASTROESOPHAGEAL REFLUX DISEASE, UNSPECIFIED WHETHER ESOPHAGITIS PRESENT: Chronic | ICD-10-CM

## 2024-06-17 DIAGNOSIS — I10 PRIMARY HYPERTENSION: Primary | Chronic | ICD-10-CM

## 2024-06-17 LAB
ALBUMIN SERPL-MCNC: 4.1 G/DL (ref 3.5–5.2)
ALBUMIN/GLOB SERPL: 1.8 G/DL
ALP SERPL-CCNC: 97 U/L (ref 39–117)
ALT SERPL-CCNC: 27 U/L (ref 1–41)
AST SERPL-CCNC: 24 U/L (ref 1–40)
BILIRUB SERPL-MCNC: 0.6 MG/DL (ref 0–1.2)
BUN SERPL-MCNC: 13 MG/DL (ref 8–23)
BUN/CREAT SERPL: 14.8 (ref 7–25)
CALCIUM SERPL-MCNC: 9.3 MG/DL (ref 8.6–10.5)
CHLORIDE SERPL-SCNC: 105 MMOL/L (ref 98–107)
CHOLEST SERPL-MCNC: 119 MG/DL (ref 0–200)
CO2 SERPL-SCNC: 26.8 MMOL/L (ref 22–29)
CREAT SERPL-MCNC: 0.88 MG/DL (ref 0.76–1.27)
EGFRCR SERPLBLD CKD-EPI 2021: 89.1 ML/MIN/1.73
ERYTHROCYTE [DISTWIDTH] IN BLOOD BY AUTOMATED COUNT: 13.8 % (ref 12.3–15.4)
GLOBULIN SER CALC-MCNC: 2.3 GM/DL
GLUCOSE SERPL-MCNC: 110 MG/DL (ref 65–99)
HCT VFR BLD AUTO: 46 % (ref 37.5–51)
HDLC SERPL-MCNC: 27 MG/DL (ref 40–60)
HGB BLD-MCNC: 15.3 G/DL (ref 13–17.7)
LDLC SERPL CALC-MCNC: 60 MG/DL (ref 0–100)
MCH RBC QN AUTO: 29.6 PG (ref 26.6–33)
MCHC RBC AUTO-ENTMCNC: 33.3 G/DL (ref 31.5–35.7)
MCV RBC AUTO: 89 FL (ref 79–97)
PLATELET # BLD AUTO: 225 10*3/MM3 (ref 140–450)
POTASSIUM SERPL-SCNC: 4.4 MMOL/L (ref 3.5–5.2)
PROT SERPL-MCNC: 6.4 G/DL (ref 6–8.5)
RBC # BLD AUTO: 5.17 10*6/MM3 (ref 4.14–5.8)
SODIUM SERPL-SCNC: 140 MMOL/L (ref 136–145)
TRIGL SERPL-MCNC: 192 MG/DL (ref 0–150)
VLDLC SERPL CALC-MCNC: 32 MG/DL (ref 5–40)
WBC # BLD AUTO: 5.7 10*3/MM3 (ref 3.4–10.8)

## 2024-06-17 PROCEDURE — 1159F MED LIST DOCD IN RCRD: CPT | Performed by: NURSE PRACTITIONER

## 2024-06-17 PROCEDURE — G2211 COMPLEX E/M VISIT ADD ON: HCPCS | Performed by: NURSE PRACTITIONER

## 2024-06-17 PROCEDURE — 1125F AMNT PAIN NOTED PAIN PRSNT: CPT | Performed by: NURSE PRACTITIONER

## 2024-06-17 PROCEDURE — 3079F DIAST BP 80-89 MM HG: CPT | Performed by: NURSE PRACTITIONER

## 2024-06-17 PROCEDURE — 3075F SYST BP GE 130 - 139MM HG: CPT | Performed by: NURSE PRACTITIONER

## 2024-06-17 PROCEDURE — 99214 OFFICE O/P EST MOD 30 MIN: CPT | Performed by: NURSE PRACTITIONER

## 2024-06-17 PROCEDURE — 1160F RVW MEDS BY RX/DR IN RCRD: CPT | Performed by: NURSE PRACTITIONER

## 2024-06-17 RX ORDER — LISINOPRIL 20 MG/1
20 TABLET ORAL DAILY
Start: 2024-06-17

## 2024-06-17 NOTE — PROGRESS NOTES
"Chief Complaint  Hypertension (6 month follow up) and Fatigue (/)  Subjective        Dwayne Keita presents to [facility]  History of Present Illness  History of Present Illness  The patient is a 74-year-old male who presents for evaluation of multiple medical concerns. He is accompanied by his wife.    The patient reports persistent fatigue, which has been ongoing for several years. He experiences fatigue upon retiring to bed, and upon awakening. His wife suspects sleep apnea, although she notes that the patient's snoring is infrequent. Despite this, the patient feels unrefreshed upon waking.     He also reports neck pain, which he describes as aggravating but not severe enough to interfere with his daily activities. The pain, which radiates to his shoulder, does not disrupt his sleep or induce daily headaches.     He experiences dizziness upon changing positions.. His low back pain is manageable unless he engages in prolonged walking, which necessitates rest. He denies any ankle swelling after work. His heartburn is well-managed with Nexium, and he denies any abdominal pain.     His lisinopril dosage was increased by his cardiologist to lisinopril 10 mg daily but BP remains elevated in the office today. He plans to incorporate walking into his routine. His wife reports that the patient has been ill twice in the past month, with symptoms lasting for a week. COVID-19 testing was negative.     He works full-time 4 days a week.   He is up-to-date on his COVID-19 and influenza vaccines.    Objective   Vital Signs:  /84 (BP Location: Left arm, Patient Position: Sitting, Cuff Size: Adult)   Pulse 68   Temp 97.1 °F (36.2 °C)   Ht 185.4 cm (73\")   Wt 103 kg (226 lb 9.6 oz)   SpO2 96%   BMI 29.90 kg/m²   Estimated body mass index is 29.9 kg/m² as calculated from the following:    Height as of this encounter: 185.4 cm (73\").    Weight as of this encounter: 103 kg (226 lb 9.6 " oz).            Physical Exam  Constitutional:       Appearance: He is well-developed. He is not ill-appearing.   HENT:      Head: Normocephalic.      Right Ear: Hearing, tympanic membrane and external ear normal.      Left Ear: Hearing, tympanic membrane and external ear normal.      Nose: Nose normal. No nasal deformity, mucosal edema or rhinorrhea.      Right Sinus: No maxillary sinus tenderness or frontal sinus tenderness.      Left Sinus: No maxillary sinus tenderness or frontal sinus tenderness.      Mouth/Throat:      Dentition: Normal dentition.      Pharynx: Posterior oropharyngeal erythema present.   Eyes:      General: Lids are normal.         Right eye: No discharge.         Left eye: No discharge.      Conjunctiva/sclera: Conjunctivae normal.      Right eye: No exudate.     Left eye: No exudate.  Neck:      Thyroid: No thyroid mass or thyromegaly.      Vascular: No carotid bruit.      Trachea: Trachea normal.   Cardiovascular:      Rate and Rhythm: Regular rhythm.      Pulses: Normal pulses.      Heart sounds: Normal heart sounds. No murmur heard.  Pulmonary:      Effort: No respiratory distress.      Breath sounds: Normal breath sounds. No decreased breath sounds, wheezing, rhonchi or rales.   Abdominal:      General: Bowel sounds are normal.      Palpations: Abdomen is soft.      Tenderness: There is no abdominal tenderness.   Musculoskeletal:      Cervical back: Normal range of motion. No edema.   Lymphadenopathy:      Head:      Right side of head: No submental, submandibular, tonsillar, preauricular, posterior auricular or occipital adenopathy.      Left side of head: No submental, submandibular, tonsillar, preauricular, posterior auricular or occipital adenopathy.   Skin:     General: Skin is warm and dry.      Nails: There is no clubbing.   Neurological:      Mental Status: He is alert.   Psychiatric:         Behavior: Behavior is cooperative.        Physical Exam  Vital Signs  The patient's  blood pressure is 138/84.    Result Review :  The following data was reviewed by: AMBROSIO Chance on 06/17/2024:  Common labs          12/11/2023    08:56 6/17/2024    10:04   Common Labs   Glucose 106  110    BUN 13  13    Creatinine 0.90  0.88    Sodium 146  140    Potassium 4.3  4.4    Chloride 109  105    Calcium 9.8  C 9.3    Total Protein 6.4  6.4    Albumin 4.4  4.1    Total Bilirubin 0.5  0.6    Alkaline Phosphatase 88  97    AST (SGOT) 19  24    ALT (SGPT) 20  27    WBC 6.18  5.70    Hemoglobin 14.9  15.3    Hematocrit 45.2  46.0    Platelets 194  225    Total Cholesterol 129  119    Triglycerides 168  192    HDL Cholesterol 29  27    LDL Cholesterol  71  60    PSA 0.450        Details         C Corrected result                  Results               Assessment and Plan   Diagnoses and all orders for this visit:    1. Primary hypertension (Primary)  Assessment & Plan:  Hypertension is elevated despite lisinopril daily.  Increase lisinopril to 20 mg for better BP management, continue to monitor and call if readings are consistently >140/90.  Blood pressure will be reassessed in 6 months.      Orders:  -     lisinopril (PRINIVIL,ZESTRIL) 20 MG tablet; Take 1 tablet by mouth Daily.  -     CBC (No Diff)  -     Comprehensive Metabolic Panel    2. Pure hypercholesterolemia  Assessment & Plan:  He denies myalgias with atorvastatin which he will continue along with a low-fat, low-cholesterol diet.   Lab Results   Component Value Date    LDL 60 06/17/2024       Orders:  -     Lipid Panel    3. Coronary arteriosclerosis in native artery  Assessment & Plan:  Bare metal stent 11/2017  CABG x1 12/2017  Cath 12/2020: Nonobstructive CAD, widely patent stent in RCA, 2 of 2 bypass grafts patent, normal LVSF, normal LVEDP, no significant MR or AS   7/2022: echo and nuclear stress test performed without acute abnl  He is managed on statin therapy, asa and a beta blockre      4. Gastroesophageal reflux disease,  unspecified whether esophagitis present  Assessment & Plan:  He is currently managed on Nexium daily, denies abdominal pain.      5. Snoring  Comments:  sleep apnea ?source of persistent fatigue, will refer for sleep evaluation  Orders:  -     Ambulatory Referral to Sleep Medicine      Assessment & Plan           Follow Up   Return in about 6 months (around 12/17/2024).  Patient was given instructions and counseling regarding his condition or for health maintenance advice. Please see specific information pulled into the AVS if appropriate.     Patient or patient representative verbalized consent for the use of Ambient Listening during the visit with  AMBROSIO Chance for chart documentation. 6/29/2024  15:11 EDT

## 2024-06-29 NOTE — ASSESSMENT & PLAN NOTE
Hypertension is elevated despite lisinopril daily.  Increase lisinopril to 20 mg for better BP management, continue to monitor and call if readings are consistently >140/90.  Blood pressure will be reassessed in 6 months.

## 2024-06-29 NOTE — ASSESSMENT & PLAN NOTE
Bare metal stent 11/2017  CABG x1 12/2017  Cath 12/2020: Nonobstructive CAD, widely patent stent in RCA, 2 of 2 bypass grafts patent, normal LVSF, normal LVEDP, no significant MR or AS   7/2022: echo and nuclear stress test performed without acute abnl  He is managed on statin therapy, asa and a beta blockre

## 2024-06-29 NOTE — ASSESSMENT & PLAN NOTE
He denies myalgias with atorvastatin which he will continue along with a low-fat, low-cholesterol diet.   Lab Results   Component Value Date    LDL 60 06/17/2024

## 2024-10-21 ENCOUNTER — OFFICE VISIT (OUTPATIENT)
Dept: SLEEP MEDICINE | Facility: HOSPITAL | Age: 74
End: 2024-10-21
Payer: MEDICARE

## 2024-10-21 VITALS — HEART RATE: 62 BPM | OXYGEN SATURATION: 96 % | HEIGHT: 73 IN | BODY MASS INDEX: 30.35 KG/M2 | WEIGHT: 229 LBS

## 2024-10-21 DIAGNOSIS — R06.83 SNORING: Primary | ICD-10-CM

## 2024-10-21 DIAGNOSIS — I10 PRIMARY HYPERTENSION: Chronic | ICD-10-CM

## 2024-10-21 DIAGNOSIS — G47.52 DREAM ENACTMENT BEHAVIOR: ICD-10-CM

## 2024-10-21 DIAGNOSIS — G47.33 OSA (OBSTRUCTIVE SLEEP APNEA): ICD-10-CM

## 2024-10-21 DIAGNOSIS — I25.10 CORONARY ARTERIOSCLEROSIS IN NATIVE ARTERY: Chronic | ICD-10-CM

## 2024-10-21 DIAGNOSIS — K21.9 GASTROESOPHAGEAL REFLUX DISEASE, UNSPECIFIED WHETHER ESOPHAGITIS PRESENT: Chronic | ICD-10-CM

## 2024-10-21 PROCEDURE — 99204 OFFICE O/P NEW MOD 45 MIN: CPT | Performed by: PSYCHIATRY & NEUROLOGY

## 2024-10-21 PROCEDURE — G0463 HOSPITAL OUTPT CLINIC VISIT: HCPCS

## 2024-10-21 NOTE — PROGRESS NOTES
Patient Care Team:  Ivelisse Casarez APRN as PCP - General (Internal Medicine)  MOSES.Fidelia Rich MD, MPH as Consulting Physician (Sleep Medicine)        History of Present Illness  The patient is a 73-year-old male who presents for evaluation of sleep apnea. He is accompanied by his wife.    He reports experiencing fatigue, which prompted his primary care physician to recommend a sleep study. His wife has not observed any instances of snoring or gasping for air during his sleep. However, she notes that he frequently talks in his sleep and has vivid dreams, often involving activities such as running or cycling. These symptoms have been present for the duration of their 10-year marriage. He maintains a regular sleep schedule, going to bed at 10:00 PM on weekdays and 11:00 PM on weekends.    Despite his age, he continues to work full-time and spends his leisure time working on classic cars in his garage. He has never undergone a sleep study before.    He also mentions a history of heart attack and a previous smoking habit, which he has since quit.    SOCIAL HISTORY  The patient works as a  at [email or web address].       [place]: 8    Data Reviewed: Reviewed his medical chart sleep questionnaire      PMH:  Past Medical History:   Diagnosis Date    Allergic rhinitis     Anxiety state     Arthritis     Benign neoplasm of skin     Benign positional vertigo     Coronary artery disease     Episodic mood disorder     GERD (gastroesophageal reflux disease)     Hyperlipidemia     Hypertension     MI (myocardial infarction)     2007    Vertigo           Allergies:  Patient has no known allergies.     Medication Review:   Current Outpatient Medications on File Prior to Visit   Medication Sig Dispense Refill    acetaminophen (TYLENOL) 500 MG tablet Take 1 tablet by mouth Every 6 (Six) Hours As Needed for Mild Pain. 30 tablet 0    aspirin 81 MG tablet Take 1 tablet by mouth Daily.      atorvastatin (LIPITOR) 80 MG  "tablet Take 1 tablet by mouth Daily. 90 tablet 1    Cetirizine HCl (ZyrTEC Allergy) 10 MG capsule Take  by mouth.      cholecalciferol (VITAMIN D3) 10 MCG (400 UNIT) tablet Take 1 tablet by mouth Daily.      diphenhydrAMINE-acetaminophen (TYLENOL PM)  MG tablet per tablet Take 1 tablet by mouth At Night As Needed for Sleep.      isosorbide mononitrate (IMDUR) 30 MG 24 hr tablet Take 1 tablet by mouth Daily. 90 tablet 1    lisinopril (PRINIVIL,ZESTRIL) 20 MG tablet Take 1 tablet by mouth Daily.      meclizine (ANTIVERT) 12.5 MG tablet Take 1 tablet by mouth 3 (Three) Times a Day As Needed for Dizziness. 30 tablet 0    metoprolol succinate XL (TOPROL-XL) 50 MG 24 hr tablet Take 1/2 tablet by mouth daily 90 tablet 1    NEXIUM 24HR 20 MG tablet delayed-release       nitroglycerin (NITROSTAT) 0.4 MG SL tablet Place 1 tablet under the tongue Every 5 (Five) Minutes As Needed. (Patient not taking: Reported on 6/17/2024)      Omega-3 Fatty Acids (FISH OIL PO) Take 1 capsule by mouth Daily.      sertraline (ZOLOFT) 100 MG tablet Take 0.5 tablets by mouth Daily. 45 tablet 1     No current facility-administered medications on file prior to visit.         Vital Signs:    Vitals:    10/21/24 1255   Pulse: 62   SpO2: 96%   Weight: 104 kg (229 lb)   Height: 185.4 cm (73\")        Body mass index is 30.21 kg/m².  Neck Circumference: 19 inches  .[email or web address]         Physical Exam:    Constitutional:  Well developed 74 y.o. male that appears in no apparent distress.  Awake & oriented times 3.  Normal mood with normal recent and remote memory and normal judgement.  Eyes:  Conjunctivae normal.  Oropharynx: Moist mucous membranes without exudate and Mallampati 3  Neck: Trachea midline  Respiratory: Effort is not labored  Cardiovascular: Radial pulse regular  Musculoskeletal: Gait appears normal, no digital clubbing evident, no pre-tibial edema        Impression:   Encounter Diagnoses   Name Primary?    Snoring Yes    Dream enactment " behavior     Coronary arteriosclerosis in native artery     Primary hypertension     Gastroesophageal reflux disease, unspecified whether esophagitis present      Patient's BMI is Body mass index is 30.21 kg/m².          Assessment & Plan  1. Suspected Sleep Apnea.  The patient reports persistent tiredness and waking up tired. His wife has not noticed snoring or gasping, but he talks in his sleep and has vivid dreams. A home sleep apnea test will be conducted to measure respiratory effort, airflow, oxygen levels, snoring, body position, and heart rate. He will be educated on how to use the device. If sleep apnea is confirmed, further discussions regarding treatment options will be held.         The patient should practice good sleep hygiene measures.      Weight loss might be beneficial in this patient who has a Body mass index is 30.21 kg/m².      Pathophysiology of PAPITO described to the patient.  Cardiovascular complications of untreated PAPITO also reviewed.      The patient was cautioned about the dangers of drowsy driving.    Patient or patient representative verbalized consent for the use of Ambient Listening during the visit with  Robinson Rich MD for chart documentation. 10/21/2024  13:28 EDT     Robinson Rich MD  Sleep Medicine  10/21/24  13:20 EDT

## 2024-10-29 RX ORDER — SERTRALINE HYDROCHLORIDE 100 MG/1
50 TABLET, FILM COATED ORAL DAILY
Qty: 45 TABLET | Refills: 1 | Status: SHIPPED | OUTPATIENT
Start: 2024-10-29

## 2024-10-29 RX ORDER — LISINOPRIL 10 MG/1
20 TABLET ORAL DAILY
Qty: 90 TABLET | Refills: 1 | OUTPATIENT
Start: 2024-10-29

## 2024-11-06 DIAGNOSIS — I10 PRIMARY HYPERTENSION: Chronic | ICD-10-CM

## 2024-11-06 RX ORDER — LISINOPRIL 20 MG/1
20 TABLET ORAL DAILY
Qty: 90 TABLET | Refills: 1 | Status: SHIPPED | OUTPATIENT
Start: 2024-11-06

## 2024-11-19 ENCOUNTER — PATIENT MESSAGE (OUTPATIENT)
Dept: INTERNAL MEDICINE | Facility: CLINIC | Age: 74
End: 2024-11-19
Payer: MEDICARE

## 2024-11-19 DIAGNOSIS — I10 PRIMARY HYPERTENSION: Primary | Chronic | ICD-10-CM

## 2024-11-21 RX ORDER — METOPROLOL SUCCINATE 50 MG/1
TABLET, EXTENDED RELEASE ORAL
Qty: 90 TABLET | Refills: 1 | Status: SHIPPED | OUTPATIENT
Start: 2024-11-21

## 2025-01-15 ENCOUNTER — OFFICE VISIT (OUTPATIENT)
Dept: INTERNAL MEDICINE | Facility: CLINIC | Age: 75
End: 2025-01-15
Payer: MEDICARE

## 2025-01-15 VITALS
OXYGEN SATURATION: 97 % | SYSTOLIC BLOOD PRESSURE: 122 MMHG | DIASTOLIC BLOOD PRESSURE: 72 MMHG | HEART RATE: 68 BPM | WEIGHT: 223.87 LBS | TEMPERATURE: 97.8 F | HEIGHT: 73 IN | BODY MASS INDEX: 29.67 KG/M2

## 2025-01-15 DIAGNOSIS — R73.09 ELEVATED GLUCOSE: ICD-10-CM

## 2025-01-15 DIAGNOSIS — I10 PRIMARY HYPERTENSION: Chronic | ICD-10-CM

## 2025-01-15 DIAGNOSIS — K21.9 GASTROESOPHAGEAL REFLUX DISEASE, UNSPECIFIED WHETHER ESOPHAGITIS PRESENT: Chronic | ICD-10-CM

## 2025-01-15 DIAGNOSIS — Z00.00 MEDICARE ANNUAL WELLNESS VISIT, SUBSEQUENT: Primary | ICD-10-CM

## 2025-01-15 DIAGNOSIS — E78.00 PURE HYPERCHOLESTEROLEMIA: Chronic | ICD-10-CM

## 2025-01-15 LAB
ALBUMIN SERPL-MCNC: 3.9 G/DL (ref 3.5–5.2)
ALBUMIN/GLOB SERPL: 1.6 G/DL
ALP SERPL-CCNC: 97 U/L (ref 39–117)
ALT SERPL-CCNC: 24 U/L (ref 1–41)
AST SERPL-CCNC: 23 U/L (ref 1–40)
BILIRUB SERPL-MCNC: 0.7 MG/DL (ref 0–1.2)
BUN SERPL-MCNC: 11 MG/DL (ref 8–23)
BUN/CREAT SERPL: 11.6 (ref 7–25)
CALCIUM SERPL-MCNC: 9.5 MG/DL (ref 8.6–10.5)
CHLORIDE SERPL-SCNC: 105 MMOL/L (ref 98–107)
CHOLEST SERPL-MCNC: 111 MG/DL (ref 0–200)
CO2 SERPL-SCNC: 27 MMOL/L (ref 22–29)
CREAT SERPL-MCNC: 0.95 MG/DL (ref 0.76–1.27)
EGFRCR SERPLBLD CKD-EPI 2021: 83 ML/MIN/1.73
ERYTHROCYTE [DISTWIDTH] IN BLOOD BY AUTOMATED COUNT: 13.5 % (ref 12.3–15.4)
GLOBULIN SER CALC-MCNC: 2.4 GM/DL
GLUCOSE SERPL-MCNC: 110 MG/DL (ref 65–99)
HBA1C MFR BLD: 5.5 % (ref 4.8–5.6)
HCT VFR BLD AUTO: 42.9 % (ref 37.5–51)
HDLC SERPL-MCNC: 23 MG/DL (ref 40–60)
HGB BLD-MCNC: 14.5 G/DL (ref 13–17.7)
LDLC SERPL CALC-MCNC: 57 MG/DL (ref 0–100)
MCH RBC QN AUTO: 29.9 PG (ref 26.6–33)
MCHC RBC AUTO-ENTMCNC: 33.8 G/DL (ref 31.5–35.7)
MCV RBC AUTO: 88.5 FL (ref 79–97)
PLATELET # BLD AUTO: 274 10*3/MM3 (ref 140–450)
POTASSIUM SERPL-SCNC: 4.5 MMOL/L (ref 3.5–5.2)
PROT SERPL-MCNC: 6.3 G/DL (ref 6–8.5)
RBC # BLD AUTO: 4.85 10*6/MM3 (ref 4.14–5.8)
SODIUM SERPL-SCNC: 143 MMOL/L (ref 136–145)
TRIGL SERPL-MCNC: 181 MG/DL (ref 0–150)
TSH SERPL DL<=0.005 MIU/L-ACNC: 2.21 UIU/ML (ref 0.27–4.2)
VLDLC SERPL CALC-MCNC: 31 MG/DL (ref 5–40)
WBC # BLD AUTO: 6.81 10*3/MM3 (ref 3.4–10.8)

## 2025-01-15 RX ORDER — CLINDAMYCIN PHOSPHATE 10 MG/G
GEL TOPICAL
COMMUNITY
Start: 2024-10-14

## 2025-01-15 NOTE — PROGRESS NOTES
Subjective   The ABCs of the Annual Wellness Visit  Medicare Wellness Visit      Dwayne Keita is a 74 y.o. patient who presents for a Medicare Wellness Visit.    The following portions of the patient's history were reviewed and   updated as appropriate: allergies, current medications, past family history, past medical history, past social history, past surgical history, and problem list.    Compared to one year ago, the patient's physical   health is the same.  Compared to one year ago, the patient's mental   health is the same.    Recent Hospitalizations:  He was not admitted to the hospital during the last year.     Current Medical Providers:  Patient Care Team:  Ivelisse Casarez APRN as PCP - General (Internal Medicine)    Outpatient Medications Prior to Visit   Medication Sig Dispense Refill    acetaminophen (TYLENOL) 500 MG tablet Take 1 tablet by mouth Every 6 (Six) Hours As Needed for Mild Pain. 30 tablet 0    aspirin 81 MG tablet Take 1 tablet by mouth Daily.      atorvastatin (LIPITOR) 80 MG tablet Take 1 tablet by mouth Daily. 90 tablet 1    Cetirizine HCl (ZyrTEC Allergy) 10 MG capsule Take  by mouth.      cholecalciferol (VITAMIN D3) 10 MCG (400 UNIT) tablet Take 1 tablet by mouth Daily.      clindamycin 1 % gel Every other day      diphenhydrAMINE-acetaminophen (TYLENOL PM)  MG tablet per tablet Take 1 tablet by mouth At Night As Needed for Sleep.      isosorbide mononitrate (IMDUR) 30 MG 24 hr tablet Take 1 tablet by mouth Daily. 90 tablet 1    lisinopril (PRINIVIL,ZESTRIL) 20 MG tablet Take 1 tablet by mouth Daily. 90 tablet 1    meclizine (ANTIVERT) 12.5 MG tablet Take 1 tablet by mouth 3 (Three) Times a Day As Needed for Dizziness. 30 tablet 0    metoprolol succinate XL (TOPROL-XL) 50 MG 24 hr tablet Take 1 tablet by mouth daily 90 tablet 1    NEXIUM 24HR 20 MG tablet delayed-release       Omega-3 Fatty Acids (FISH OIL PO) Take 1 capsule by mouth Daily.      sertraline (ZOLOFT) 100 MG  "tablet TAKE ONE-HALF TABLET BY MOUTH DAILY. 45 tablet 1    nitroglycerin (NITROSTAT) 0.4 MG SL tablet Place 1 tablet under the tongue Every 5 (Five) Minutes As Needed. (Patient not taking: Reported on 1/15/2025)       No facility-administered medications prior to visit.     No opioid medication identified on active medication list. I have reviewed chart for other potential  high risk medication/s and harmful drug interactions in the elderly.      Aspirin is on active medication list. Aspirin use is indicated based on review of current medical condition/s. Pros and cons of this therapy have been discussed today. Benefits of this medication outweigh potential harm.  Patient has been encouraged to continue taking this medication.  .      Patient Active Problem List   Diagnosis    Benign positional vertigo    Hypertension    Hyperlipidemia    Coronary arteriosclerosis in native artery    GERD (gastroesophageal reflux disease)    Allergic rhinitis    Dream enactment behavior    PAPITO (obstructive sleep apnea)    Elevated glucose     Advance Care Planning Advance Directive is on file.  ACP discussion was held with the patient during this visit. Patient has an advance directive in EMR which is still valid.             Objective   Vitals:    01/15/25 0739   BP: 122/72   BP Location: Left arm   Patient Position: Sitting   Cuff Size: Adult   Pulse: 68   Temp: 97.8 °F (36.6 °C)   SpO2: 97%   Weight: 102 kg (223 lb 13.9 oz)   Height: 185.4 cm (73\")   PainSc: 0-No pain       Estimated body mass index is 29.54 kg/m² as calculated from the following:    Height as of this encounter: 185.4 cm (73\").    Weight as of this encounter: 102 kg (223 lb 13.9 oz).    BMI is >= 25 and <30. (Overweight) The following options were offered after discussion;: exercise counseling/recommendations and nutrition counseling/recommendations           Does the patient have evidence of cognitive impairment? No  Lab Results   Component Value Date    CHLPL " 111 01/15/2025    TRIG 181 (H) 01/15/2025    HDL 23 (L) 01/15/2025    LDL 57 01/15/2025    VLDL 31 01/15/2025    HGBA1C 5.50 01/15/2025                                                                                                Health  Risk Assessment    Smoking Status:  Social History     Tobacco Use   Smoking Status Former    Current packs/day: 0.00    Average packs/day: 1.5 packs/day for 45.0 years (67.5 ttl pk-yrs)    Types: Cigarettes    Start date:     Quit date:     Years since quittin.0    Passive exposure: Never   Smokeless Tobacco Never     Alcohol Consumption:  Social History     Substance and Sexual Activity   Alcohol Use No       Fall Risk Screen  LINDAADI Fall Risk Assessment was completed, and patient is at LOW risk for falls.Assessment completed on:1/15/2025    Depression Screening   Little interest or pleasure in doing things? Not at all   Feeling down, depressed, or hopeless? Not at all   PHQ-2 Total Score 0      Health Habits and Functional and Cognitive Screenin/15/2025     7:43 AM   Functional & Cognitive Status   Do you have difficulty preparing food and eating? No   Do you have difficulty bathing yourself, getting dressed or grooming yourself? No   Do you have difficulty using the toilet? No   Do you have difficulty moving around from place to place? No   Do you have trouble with steps or getting out of a bed or a chair? No   Current Diet Well Balanced Diet   Dental Exam Up to date   Eye Exam Up to date   Exercise (times per week) 0 times per week   Current Exercises Include No Regular Exercise   Do you need help using the phone?  No   Are you deaf or do you have serious difficulty hearing?  Yes   Do you need help to go to places out of walking distance? No   Do you need help shopping? No   Do you need help preparing meals?  No   Do you need help with housework?  No   Do you need help with laundry? No   Do you need help taking your medications? No   Do you need help  managing money? No   Do you ever drive or ride in a car without wearing a seat belt? No   Have you felt unusual stress, anger or loneliness in the last month? No   Who do you live with? Spouse   If you need help, do you have trouble finding someone available to you? No   Have you been bothered in the last four weeks by sexual problems? No   Do you have difficulty concentrating, remembering or making decisions? No           Age-appropriate Screening Schedule:  Refer to the list below for future screening recommendations based on patient's age, sex and/or medical conditions. Orders for these recommended tests are listed in the plan section. The patient has been provided with a written plan.    Health Maintenance List  Health Maintenance   Topic Date Due    RSV Vaccine - Adults (1 - 1-dose 75+ series) Never done    COVID-19 Vaccine (8 - 2024-25 season) 09/01/2024    ZOSTER VACCINE (2 of 2) 11/04/2024    ANNUAL WELLNESS VISIT  12/11/2024    COLORECTAL CANCER SCREENING  07/01/2025    TDAP/TD VACCINES (4 - Td or Tdap) 07/17/2029 (Originally 2/3/2029)    LIPID PANEL  01/15/2026    BMI FOLLOWUP  01/15/2026    HEPATITIS C SCREENING  Completed    INFLUENZA VACCINE  Completed    Pneumococcal Vaccine 65+  Completed    LUNG CANCER SCREENING  Discontinued                                                                                                                                                CMS Preventative Services Quick Reference  Risk Factors Identified During Encounter  Immunizations Discussed/Encouraged: Shingrix and RSV (Respiratory Syncytial Virus)    The above risks/problems have been discussed with the patient.  Pertinent information has been shared with the patient in the After Visit Summary.  An After Visit Summary and PPPS were made available to the patient.    Follow Up:   Next Medicare Wellness visit to be scheduled in 1 year.         Additional E&M Note during same encounter follows:  Patient has additional,  significant, and separately identifiable condition(s)/problem(s) that require work above and beyond the Medicare Wellness Visit     Chief Complaint  Medicare Wellness-subsequent, Abdominal Pain (About a week ago - has since resolved), and Heartburn (GERD has increased)    Subjective    MAURA Rice is also being seen today for additional medical problem/s.       The patient presents for evaluation of fatigue, abdominal pain, and dizziness. He presents with his wife whom he has given permission to be present.    He was previously evaluated by Dr. Rich at the sleep clinic in October due to persistent fatigue. Despite the absence of sleep apnea symptoms such as gasping or waking up abruptly, he was advised to undergo a sleep study. However, he declined the study and the use of a CPAP mask. He reports an improvement in his fatigue levels, which began approximately one month after his last visit in June. He no longer experiences morning tiredness or lethargy.    He experienced abdominal discomfort a week ago, initially presenting as indigestion. This was followed by stomach pain persisting for 2 to 3 days. He reports no dietary changes during the holiday season. He speculates that the pain may have been due to straining his stomach while clearing snow. Currently, he is not experiencing any abdominal discomfort or nausea. He reports regular bowel movements and frequent heartburn and indigestion. He takes OTC Nexium with mild improvement in sx, does drink a glass of wine nightly before bed.    Review of Systems   HENT:  Positive for congestion, postnasal drip and rhinorrhea.    Respiratory:  Negative for cough, chest tightness and shortness of breath.    Cardiovascular:  Negative for chest pain, palpitations and leg swelling.   Gastrointestinal:  Negative for abdominal pain.   Musculoskeletal:  Positive for arthralgias and back pain.   Neurological:  Positive for dizziness and light-headedness (with turning his head).     "      Objective   Vital Signs:  /72 (BP Location: Left arm, Patient Position: Sitting, Cuff Size: Adult)   Pulse 68   Temp 97.8 °F (36.6 °C)   Ht 185.4 cm (73\")   Wt 102 kg (223 lb 13.9 oz)   SpO2 97%   BMI 29.54 kg/m²   Physical Exam  Constitutional:       Appearance: He is well-developed. He is not ill-appearing.   HENT:      Head: Normocephalic.      Right Ear: Hearing, tympanic membrane and external ear normal.      Left Ear: Hearing, tympanic membrane and external ear normal.      Nose: Nose normal. No nasal deformity, mucosal edema or rhinorrhea.      Right Sinus: No maxillary sinus tenderness or frontal sinus tenderness.      Left Sinus: No maxillary sinus tenderness or frontal sinus tenderness.      Mouth/Throat:      Dentition: Normal dentition.   Eyes:      General: Lids are normal.         Right eye: No discharge.         Left eye: No discharge.      Conjunctiva/sclera: Conjunctivae normal.      Right eye: No exudate.     Left eye: No exudate.  Neck:      Thyroid: No thyroid mass or thyromegaly.      Vascular: No carotid bruit.      Trachea: Trachea normal.   Cardiovascular:      Rate and Rhythm: Regular rhythm.      Pulses: Normal pulses.      Heart sounds: Normal heart sounds. No murmur heard.  Pulmonary:      Effort: No respiratory distress.      Breath sounds: Normal breath sounds. No decreased breath sounds, wheezing, rhonchi or rales.   Abdominal:      General: Bowel sounds are normal.      Palpations: Abdomen is soft.      Tenderness: There is no abdominal tenderness.   Musculoskeletal:      Cervical back: Normal range of motion. No edema.   Lymphadenopathy:      Head:      Right side of head: No submental, submandibular, tonsillar, preauricular, posterior auricular or occipital adenopathy.      Left side of head: No submental, submandibular, tonsillar, preauricular, posterior auricular or occipital adenopathy.   Skin:     General: Skin is warm and dry.      Nails: There is no clubbing. "   Neurological:      Mental Status: He is alert.   Psychiatric:         Behavior: Behavior is cooperative.               The following data was reviewed by: AMBROSIO Chance on 01/15/2025:  Data reviewed : Consultant notes sleep clinic 10/21/24  Common labs          6/17/2024    10:04 1/15/2025    08:55   Common Labs   Glucose 110  110    BUN 13  11    Creatinine 0.88  0.95    Sodium 140  143    Potassium 4.4  4.5    Chloride 105  105    Calcium 9.3  9.5    Total Protein 6.4  6.3    Albumin 4.1  3.9    Total Bilirubin 0.6  0.7    Alkaline Phosphatase 97  97    AST (SGOT) 24  23    ALT (SGPT) 27  24    WBC 5.70  6.81    Hemoglobin 15.3  14.5    Hematocrit 46.0  42.9    Platelets 225  274    Total Cholesterol 119  111    Triglycerides 192  181    HDL Cholesterol 27  23    LDL Cholesterol  60  57    Hemoglobin A1C  5.50        Results                Assessment and Plan Additional age appropriate preventative wellness advice topics were discussed during today's preventative wellness exam(some topics already addressed during AWV portion of the note above):   Nutrition: Discussed nutrition plan with patient. Information shared in after visit summary. Goal is for a well balanced diet to enhance overall health.            Diagnoses and all orders for this visit:    1. Medicare annual wellness visit, subsequent (Primary)    2. Primary hypertension  Assessment & Plan:  Lisinopril was increased to 20 mg 6/17/24 with improved BP control today, continue lisinopril and metoprolol along with a low sodium diet.    Orders:  -     CBC (No Diff)  -     Comprehensive Metabolic Panel  -     TSH    3. Pure hypercholesterolemia  Assessment & Plan:  He is currently managed on atorvastatin daily which he tolerates well, recheck lipid panel today.    Orders:  -     Lipid Panel    4. Gastroesophageal reflux disease, unspecified whether esophagitis present  Assessment & Plan:  The patient's GERD symptoms are likely exacerbated by his  hiatal hernia. Dietary modifications were discussed, including avoiding tomato-based foods, acidic foods, greasy or fried foods, spicy foods, late-night eating, caffeine, and chocolate. He was also advised to abstain from alcohol consumption. He is currently taking over-the-counter Nexium and can take 2 doses on days when symptoms flare up. Pepcid was recommended as an alternative for quicker relief during flare-ups. Consider further evaluation if sx worsen.      5. Elevated glucose  Assessment & Plan:  A hemoglobin A1c test will be conducted today to monitor his blood sugar levels, which have been slightly elevated recently.    Orders:  -     Cancel: Hemoglobin A1c    Other orders  -     Hemoglobin A1c    Health maintenance.  He is advised to consider receiving the RSV vaccine, which can be administered at a pharmacy. He is also encouraged to receive his second shingles vaccine dose as he received the first dose on 09/09/2024.          Follow Up   Return in about 6 months (around 7/15/2025).  Patient was given instructions and counseling regarding his condition or for health maintenance advice. Please see specific information pulled into the AVS if appropriate.  Patient or patient representative verbalized consent for the use of Ambient Listening during the visit with  AMBROSIO Chance for chart documentation. 1/18/2025  07:10 EST

## 2025-01-18 PROBLEM — K63.5 COLON POLYPS: Status: RESOLVED | Noted: 2022-05-26 | Resolved: 2025-01-18

## 2025-01-18 PROBLEM — R73.09 ELEVATED GLUCOSE: Status: ACTIVE | Noted: 2025-01-18

## 2025-01-18 NOTE — PATIENT INSTRUCTIONS
Medicare Wellness  Personal Prevention Plan of Service     Date of Office Visit:    Encounter Provider:  AMBROSIO Chance  Place of Service:  [facility]  Patient Name: Dwayne Keita  :  1950    As part of the Medicare Wellness portion of your visit today, we are providing you with this personalized preventive plan of services (PPPS). This plan is based upon recommendations of the [place] Preventive Services Task Force (USPSTF) and the Advisory Committee on Immunization Practices (ACIP).    This lists the preventive care services that should be considered, and provides dates of when you are due. Items listed as completed are up-to-date and do not require any further intervention.    Health Maintenance   Topic Date Due    RSV Vaccine - Adults (1 - 1-dose 75+ series) Never done    COVID-19 Vaccine (2024- season) 2024    ZOSTER VACCINE (2 of 2) 2024    ANNUAL WELLNESS VISIT  2024    COLORECTAL CANCER SCREENING  2025    TDAP/TD VACCINES (4 - Td or Tdap) 2029 (Originally 2/3/2029)    LIPID PANEL  01/15/2026    BMI FOLLOWUP  01/15/2026    HEPATITIS C SCREENING  Completed    INFLUENZA VACCINE  Completed    Pneumococcal Vaccine 65+  Completed    LUNG CANCER SCREENING  Discontinued       Orders Placed This Encounter   Procedures    CBC (No Diff)     Order Specific Question:   Release to patient     Answer:   Routine Release [[id number]]     Order Specific Question:   [facility] Has the patient fasted?     Answer:   Yes    Comprehensive Metabolic Panel     Order Specific Question:   Release to patient     Answer:   Routine Release [[id number]]     Order Specific Question:   [facility] Has the patient fasted?     Answer:   Yes    Lipid Panel     Order Specific Question:   Release to patient     Answer:   Routine Release [[id number]]     Order Specific Question:   [facility] Has the patient fasted?     Answer:   Yes    TSH     Order Specific  Question:   Release to patient     Answer:   Routine Release [[id number]]     Order Specific Question:   [facility] Has the patient fasted?     Answer:   Yes    Hemoglobin A1c     Order Specific Question:   [facility] Has the patient fasted?     Answer:   Yes       Return in about 6 months (around 7/15/2025).

## 2025-01-18 NOTE — ASSESSMENT & PLAN NOTE
The patient's GERD symptoms are likely exacerbated by his hiatal hernia. Dietary modifications were discussed, including avoiding tomato-based foods, acidic foods, greasy or fried foods, spicy foods, late-night eating, caffeine, and chocolate. He was also advised to abstain from alcohol consumption. He is currently taking over-the-counter Nexium and can take 2 doses on days when symptoms flare up. Pepcid was recommended as an alternative for quicker relief during flare-ups. Consider further evaluation if sx worsen.

## 2025-01-18 NOTE — ASSESSMENT & PLAN NOTE
Lisinopril was increased to 20 mg 6/17/24 with improved BP control today, continue lisinopril and metoprolol along with a low sodium diet.

## 2025-01-18 NOTE — ASSESSMENT & PLAN NOTE
A hemoglobin A1c test will be conducted today to monitor his blood sugar levels, which have been slightly elevated recently.

## 2025-01-20 ENCOUNTER — POP HEALTH PHARMACY (OUTPATIENT)
Dept: PHARMACY | Facility: OTHER | Age: 75
End: 2025-01-20
Payer: MEDICARE

## 2025-01-20 NOTE — PROGRESS NOTES
Population Health Pharmacy Outreach    [facility] PATRICA Keita was called today to discuss medication adherence due to apparent barriers in the previous calendar year. After discussion, the following reason(s) were noted for non-adherence:No problems identified. Recommended resolutions include: Directed education.    Introduced self, explained Pharmacy Outreach role and provided contact information. Verified patient name and date of birth. Spoke with patient about medication adherence for 2025. Mr Keita would like to have all his medications for 100 days. He also knows to mention this to all his providers. Has an appointment with cardiology tomorrow.  He no longer uses [facility], due to issues with getting his medications. At this time, he is not interested in using [facility] mail order.  He takes his blood pressure when he sees the machine, nothing abnormal about readings that he could remember. Takes his cholesterol medication at night after his evening shower. Chart reveals that atorvastatin was last dispensed in July 2024, he is sure he has medication on hand but will discuss with his wife.      Margarita Mares LPN, 01/20/25, 2:35 PM EST

## 2025-01-23 RX ORDER — SERTRALINE HYDROCHLORIDE 100 MG/1
50 TABLET, FILM COATED ORAL DAILY
Qty: 45 TABLET | Refills: 1 | Status: SHIPPED | OUTPATIENT
Start: 2025-01-23

## 2025-01-29 ENCOUNTER — TELEPHONE (OUTPATIENT)
Dept: GASTROENTEROLOGY | Facility: CLINIC | Age: 75
End: 2025-01-29
Payer: MEDICARE

## 2025-01-29 NOTE — TELEPHONE ENCOUNTER
Hub staff attempted to follow warm transfer process and was unsuccessful     Caller: Carlota Cabrera    Relationship to patient: Emergency Contact    Best call back number: [phone]      Patient is needing: PT SPOUSE CALLED TO SCHEDULE FROM RECALL LETTER. PLEASE CALL SPOUSE TO DISCUSS.

## 2025-01-30 NOTE — TELEPHONE ENCOUNTER
Called the patient and let him know I would send out some paperwork and once it is received back, Dr. Bonilla will go over it and someone will be able to get him scheduled for a colonoscopy.     Mailed OA questionnaire to patient to complete for screening. Will defer for 30 days and contact patient if not received.

## 2025-02-05 RX ORDER — ATORVASTATIN CALCIUM 80 MG/1
80 TABLET, FILM COATED ORAL DAILY
Qty: 90 TABLET | Refills: 1 | Status: SHIPPED | OUTPATIENT
Start: 2025-02-05

## 2025-02-06 RX ORDER — ISOSORBIDE MONONITRATE 30 MG/1
30 TABLET, EXTENDED RELEASE ORAL DAILY
Qty: 90 TABLET | Refills: 1 | Status: SHIPPED | OUTPATIENT
Start: 2025-02-06

## 2025-03-05 ENCOUNTER — TELEPHONE (OUTPATIENT)
Dept: GASTROENTEROLOGY | Facility: CLINIC | Age: 75
End: 2025-03-05
Payer: MEDICARE

## 2025-03-05 NOTE — TELEPHONE ENCOUNTER
Last colonoscopy 7/1/22    Personal hx polyps    No family hx polyps or cx    ASA or blood thinners:  Aspirin    List medications:  Lisinopril  Metoprolol  Sertraline hcl  Isosorbide  Atorvastatin  Nexium  Abad  Vitamin D3  Fish oil  Vitamin C  Zyrtec    OA form scanned in media

## 2025-03-06 ENCOUNTER — PREP FOR SURGERY (OUTPATIENT)
Dept: OTHER | Facility: HOSPITAL | Age: 75
End: 2025-03-06
Payer: MEDICARE

## 2025-03-06 DIAGNOSIS — Z12.11 ENCOUNTER FOR SCREENING FOR MALIGNANT NEOPLASM OF COLON: Primary | ICD-10-CM

## 2025-03-07 ENCOUNTER — TELEPHONE (OUTPATIENT)
Dept: GASTROENTEROLOGY | Facility: CLINIC | Age: 75
End: 2025-03-07
Payer: MEDICARE

## 2025-03-07 NOTE — TELEPHONE ENCOUNTER
NIKIA CANNON IN SCHEDULING PT SCHEDULED 06/05/2025 ARRIVING AT 0800AM CLS PREP INFORMATION MAILED TO ADDRESS ON FILE VERIFIED BY PT OK FOR HUB TO RELAY

## 2025-03-11 ENCOUNTER — PATIENT ROUNDING (BHMG ONLY) (OUTPATIENT)
Dept: URGENT CARE | Facility: CLINIC | Age: 75
End: 2025-03-11
Payer: MEDICARE

## 2025-03-11 ENCOUNTER — APPOINTMENT (OUTPATIENT)
Dept: GENERAL RADIOLOGY | Facility: HOSPITAL | Age: 75
End: 2025-03-11
Payer: MEDICARE

## 2025-03-11 ENCOUNTER — HOSPITAL ENCOUNTER (OUTPATIENT)
Facility: HOSPITAL | Age: 75
Setting detail: OBSERVATION
Discharge: HOME | End: 2025-03-13
Attending: EMERGENCY MEDICINE | Admitting: STUDENT IN AN ORGANIZED HEALTH CARE EDUCATION/TRAINING PROGRAM
Payer: MEDICARE

## 2025-03-11 DIAGNOSIS — E87.20 LACTIC ACIDOSIS: ICD-10-CM

## 2025-03-11 DIAGNOSIS — J18.9 COMMUNITY ACQUIRED PNEUMONIA, UNSPECIFIED LATERALITY: Primary | ICD-10-CM

## 2025-03-11 PROBLEM — R19.7 DIARRHEA: Status: ACTIVE | Noted: 2025-03-11

## 2025-03-11 PROBLEM — R11.2 NAUSEA AND VOMITING: Status: ACTIVE | Noted: 2025-03-11

## 2025-03-11 LAB
ADV 40+41 DNA STL QL NAA+NON-PROBE: NOT DETECTED
ALBUMIN SERPL-MCNC: 3.9 G/DL (ref 3.5–5.2)
ALBUMIN/GLOB SERPL: 1.4 G/DL
ALP SERPL-CCNC: 90 U/L (ref 39–117)
ALT SERPL W P-5'-P-CCNC: 22 U/L (ref 1–41)
ANION GAP SERPL CALCULATED.3IONS-SCNC: 14.5 MMOL/L (ref 5–15)
AST SERPL-CCNC: 25 U/L (ref 1–40)
ASTRO TYP 1-8 RNA STL QL NAA+NON-PROBE: NOT DETECTED
B PARAPERT DNA SPEC QL NAA+PROBE: NOT DETECTED
B PERT DNA SPEC QL NAA+PROBE: NOT DETECTED
BASOPHILS # BLD AUTO: 0.03 10*3/MM3 (ref 0–0.2)
BASOPHILS NFR BLD AUTO: 0.3 % (ref 0–1.5)
BILIRUB SERPL-MCNC: 0.9 MG/DL (ref 0–1.2)
BUN SERPL-MCNC: 18 MG/DL (ref 8–23)
BUN/CREAT SERPL: 17 (ref 7–25)
C CAYETANENSIS DNA STL QL NAA+NON-PROBE: NOT DETECTED
C COLI+JEJ+UPSA DNA STL QL NAA+NON-PROBE: NOT DETECTED
C PNEUM DNA NPH QL NAA+NON-PROBE: NOT DETECTED
CALCIUM SPEC-SCNC: 9 MG/DL (ref 8.6–10.5)
CHLORIDE SERPL-SCNC: 103 MMOL/L (ref 98–107)
CO2 SERPL-SCNC: 23.5 MMOL/L (ref 22–29)
CREAT SERPL-MCNC: 1.06 MG/DL (ref 0.76–1.27)
CRYPTOSP DNA STL QL NAA+NON-PROBE: NOT DETECTED
D-LACTATE SERPL-SCNC: 1.9 MMOL/L (ref 0.5–2)
D-LACTATE SERPL-SCNC: 2.9 MMOL/L (ref 0.5–2)
DEPRECATED RDW RBC AUTO: 46.9 FL (ref 37–54)
E HISTOLYT DNA STL QL NAA+NON-PROBE: NOT DETECTED
EAEC PAA PLAS AGGR+AATA ST NAA+NON-PRB: NOT DETECTED
EC STX1+STX2 GENES STL QL NAA+NON-PROBE: NOT DETECTED
EGFRCR SERPLBLD CKD-EPI 2021: 72.3 ML/MIN/1.73
EOSINOPHIL # BLD AUTO: 0.07 10*3/MM3 (ref 0–0.4)
EOSINOPHIL NFR BLD AUTO: 0.8 % (ref 0.3–6.2)
EPEC EAE GENE STL QL NAA+NON-PROBE: NOT DETECTED
ERYTHROCYTE [DISTWIDTH] IN BLOOD BY AUTOMATED COUNT: 14.4 % (ref 12.3–15.4)
ETEC LTA+ST1A+ST1B TOX ST NAA+NON-PROBE: NOT DETECTED
FLUAV SUBTYP SPEC NAA+PROBE: NOT DETECTED
FLUBV RNA ISLT QL NAA+PROBE: NOT DETECTED
G LAMBLIA DNA STL QL NAA+NON-PROBE: NOT DETECTED
GLOBULIN UR ELPH-MCNC: 2.8 GM/DL
GLUCOSE SERPL-MCNC: 156 MG/DL (ref 65–99)
HADV DNA SPEC NAA+PROBE: NOT DETECTED
HCOV 229E RNA SPEC QL NAA+PROBE: NOT DETECTED
HCOV HKU1 RNA SPEC QL NAA+PROBE: NOT DETECTED
HCOV NL63 RNA SPEC QL NAA+PROBE: NOT DETECTED
HCOV OC43 RNA SPEC QL NAA+PROBE: NOT DETECTED
HCT VFR BLD AUTO: 43.6 % (ref 37.5–51)
HGB BLD-MCNC: 14.5 G/DL (ref 13–17.7)
HMPV RNA NPH QL NAA+NON-PROBE: NOT DETECTED
HOLD SPECIMEN: NORMAL
HOLD SPECIMEN: NORMAL
HPIV1 RNA ISLT QL NAA+PROBE: NOT DETECTED
HPIV2 RNA SPEC QL NAA+PROBE: NOT DETECTED
HPIV3 RNA NPH QL NAA+PROBE: NOT DETECTED
HPIV4 P GENE NPH QL NAA+PROBE: NOT DETECTED
IMM GRANULOCYTES # BLD AUTO: 0.02 10*3/MM3 (ref 0–0.05)
IMM GRANULOCYTES NFR BLD AUTO: 0.2 % (ref 0–0.5)
L PNEUMO1 AG UR QL IA: NEGATIVE
LIPASE SERPL-CCNC: 31 U/L (ref 13–60)
LYMPHOCYTES # BLD AUTO: 0.51 10*3/MM3 (ref 0.7–3.1)
LYMPHOCYTES NFR BLD AUTO: 5.8 % (ref 19.6–45.3)
M PNEUMO IGG SER IA-ACNC: NOT DETECTED
MCH RBC QN AUTO: 29.8 PG (ref 26.6–33)
MCHC RBC AUTO-ENTMCNC: 33.3 G/DL (ref 31.5–35.7)
MCV RBC AUTO: 89.5 FL (ref 79–97)
MONOCYTES # BLD AUTO: 0.47 10*3/MM3 (ref 0.1–0.9)
MONOCYTES NFR BLD AUTO: 5.4 % (ref 5–12)
NEUTROPHILS NFR BLD AUTO: 7.63 10*3/MM3 (ref 1.7–7)
NEUTROPHILS NFR BLD AUTO: 87.5 % (ref 42.7–76)
NOROVIRUS GI+II RNA STL QL NAA+NON-PROBE: DETECTED
NRBC BLD AUTO-RTO: 0 /100 WBC (ref 0–0.2)
P SHIGELLOIDES DNA STL QL NAA+NON-PROBE: NOT DETECTED
PLATELET # BLD AUTO: 189 10*3/MM3 (ref 140–450)
PMV BLD AUTO: 10.3 FL (ref 6–12)
POTASSIUM SERPL-SCNC: 3.5 MMOL/L (ref 3.5–5.2)
PROCALCITONIN SERPL-MCNC: 0.28 NG/ML (ref 0–0.25)
PROT SERPL-MCNC: 6.7 G/DL (ref 6–8.5)
RBC # BLD AUTO: 4.87 10*6/MM3 (ref 4.14–5.8)
RHINOVIRUS RNA SPEC NAA+PROBE: NOT DETECTED
RSV RNA NPH QL NAA+NON-PROBE: NOT DETECTED
RVA RNA STL QL NAA+NON-PROBE: NOT DETECTED
S ENT+BONG DNA STL QL NAA+NON-PROBE: NOT DETECTED
S PNEUM AG SPEC QL LA: NEGATIVE
SAPO I+II+IV+V RNA STL QL NAA+NON-PROBE: NOT DETECTED
SARS-COV-2 RNA NPH QL NAA+NON-PROBE: NOT DETECTED
SHIGELLA SP+EIEC IPAH ST NAA+NON-PROBE: NOT DETECTED
SODIUM SERPL-SCNC: 141 MMOL/L (ref 136–145)
V CHOL+PARA+VUL DNA STL QL NAA+NON-PROBE: NOT DETECTED
V CHOLERAE DNA STL QL NAA+NON-PROBE: NOT DETECTED
WBC NRBC COR # BLD AUTO: 8.73 10*3/MM3 (ref 3.4–10.8)
WHOLE BLOOD HOLD COAG: NORMAL
WHOLE BLOOD HOLD SPECIMEN: NORMAL
Y ENTEROCOL DNA STL QL NAA+NON-PROBE: NOT DETECTED

## 2025-03-11 PROCEDURE — 83690 ASSAY OF LIPASE: CPT | Performed by: STUDENT IN AN ORGANIZED HEALTH CARE EDUCATION/TRAINING PROGRAM

## 2025-03-11 PROCEDURE — G0378 HOSPITAL OBSERVATION PER HR: HCPCS

## 2025-03-11 PROCEDURE — 36415 COLL VENOUS BLD VENIPUNCTURE: CPT | Performed by: EMERGENCY MEDICINE

## 2025-03-11 PROCEDURE — 83605 ASSAY OF LACTIC ACID: CPT | Performed by: EMERGENCY MEDICINE

## 2025-03-11 PROCEDURE — 96361 HYDRATE IV INFUSION ADD-ON: CPT

## 2025-03-11 PROCEDURE — 96367 TX/PROPH/DG ADDL SEQ IV INF: CPT

## 2025-03-11 PROCEDURE — 96376 TX/PRO/DX INJ SAME DRUG ADON: CPT

## 2025-03-11 PROCEDURE — 84145 PROCALCITONIN (PCT): CPT | Performed by: STUDENT IN AN ORGANIZED HEALTH CARE EDUCATION/TRAINING PROGRAM

## 2025-03-11 PROCEDURE — 25010000002 ONDANSETRON PER 1 MG: Performed by: STUDENT IN AN ORGANIZED HEALTH CARE EDUCATION/TRAINING PROGRAM

## 2025-03-11 PROCEDURE — 25010000002 CEFTRIAXONE PER 250 MG: Performed by: EMERGENCY MEDICINE

## 2025-03-11 PROCEDURE — 25810000003 LACTATED RINGERS PER 1000 ML: Performed by: STUDENT IN AN ORGANIZED HEALTH CARE EDUCATION/TRAINING PROGRAM

## 2025-03-11 PROCEDURE — 25010000002 AZITHROMYCIN PER 500 MG: Performed by: EMERGENCY MEDICINE

## 2025-03-11 PROCEDURE — 87040 BLOOD CULTURE FOR BACTERIA: CPT | Performed by: EMERGENCY MEDICINE

## 2025-03-11 PROCEDURE — 99285 EMERGENCY DEPT VISIT HI MDM: CPT

## 2025-03-11 PROCEDURE — 80053 COMPREHEN METABOLIC PANEL: CPT | Performed by: EMERGENCY MEDICINE

## 2025-03-11 PROCEDURE — 85025 COMPLETE CBC W/AUTO DIFF WBC: CPT | Performed by: EMERGENCY MEDICINE

## 2025-03-11 PROCEDURE — 96365 THER/PROPH/DIAG IV INF INIT: CPT

## 2025-03-11 PROCEDURE — 25010000002 ONDANSETRON PER 1 MG: Performed by: EMERGENCY MEDICINE

## 2025-03-11 PROCEDURE — 87507 IADNA-DNA/RNA PROBE TQ 12-25: CPT | Performed by: STUDENT IN AN ORGANIZED HEALTH CARE EDUCATION/TRAINING PROGRAM

## 2025-03-11 PROCEDURE — 96375 TX/PRO/DX INJ NEW DRUG ADDON: CPT

## 2025-03-11 PROCEDURE — 0202U NFCT DS 22 TRGT SARS-COV-2: CPT | Performed by: EMERGENCY MEDICINE

## 2025-03-11 PROCEDURE — 25810000003 SODIUM CHLORIDE 0.9 % SOLUTION 250 ML FLEX CONT: Performed by: EMERGENCY MEDICINE

## 2025-03-11 PROCEDURE — 71045 X-RAY EXAM CHEST 1 VIEW: CPT

## 2025-03-11 PROCEDURE — 87449 NOS EACH ORGANISM AG IA: CPT | Performed by: STUDENT IN AN ORGANIZED HEALTH CARE EDUCATION/TRAINING PROGRAM

## 2025-03-11 PROCEDURE — 25810000003 LACTATED RINGERS SOLUTION: Performed by: EMERGENCY MEDICINE

## 2025-03-11 RX ORDER — POLYETHYLENE GLYCOL 3350 17 G/17G
17 POWDER, FOR SOLUTION ORAL DAILY PRN
Status: DISCONTINUED | OUTPATIENT
Start: 2025-03-11 | End: 2025-03-13 | Stop reason: HOSPADM

## 2025-03-11 RX ORDER — ASPIRIN 81 MG/1
81 TABLET ORAL DAILY
Status: DISCONTINUED | OUTPATIENT
Start: 2025-03-11 | End: 2025-03-13 | Stop reason: HOSPADM

## 2025-03-11 RX ORDER — ACETAMINOPHEN 650 MG/1
650 SUPPOSITORY RECTAL EVERY 4 HOURS PRN
Status: DISCONTINUED | OUTPATIENT
Start: 2025-03-11 | End: 2025-03-13 | Stop reason: HOSPADM

## 2025-03-11 RX ORDER — ISOSORBIDE MONONITRATE 30 MG/1
30 TABLET, EXTENDED RELEASE ORAL DAILY
Status: DISCONTINUED | OUTPATIENT
Start: 2025-03-12 | End: 2025-03-13 | Stop reason: HOSPADM

## 2025-03-11 RX ORDER — ACETAMINOPHEN 325 MG/1
650 TABLET ORAL EVERY 4 HOURS PRN
Status: DISCONTINUED | OUTPATIENT
Start: 2025-03-11 | End: 2025-03-13 | Stop reason: HOSPADM

## 2025-03-11 RX ORDER — ATORVASTATIN CALCIUM 20 MG/1
80 TABLET, FILM COATED ORAL DAILY
Status: DISCONTINUED | OUTPATIENT
Start: 2025-03-11 | End: 2025-03-13 | Stop reason: HOSPADM

## 2025-03-11 RX ORDER — PANTOPRAZOLE SODIUM 40 MG/1
40 TABLET, DELAYED RELEASE ORAL
Status: DISCONTINUED | OUTPATIENT
Start: 2025-03-12 | End: 2025-03-13 | Stop reason: HOSPADM

## 2025-03-11 RX ORDER — ONDANSETRON 2 MG/ML
4 INJECTION INTRAMUSCULAR; INTRAVENOUS ONCE
Status: COMPLETED | OUTPATIENT
Start: 2025-03-11 | End: 2025-03-11

## 2025-03-11 RX ORDER — SODIUM CHLORIDE, SODIUM LACTATE, POTASSIUM CHLORIDE, CALCIUM CHLORIDE 600; 310; 30; 20 MG/100ML; MG/100ML; MG/100ML; MG/100ML
100 INJECTION, SOLUTION INTRAVENOUS CONTINUOUS
Status: ACTIVE | OUTPATIENT
Start: 2025-03-11 | End: 2025-03-12

## 2025-03-11 RX ORDER — BISACODYL 10 MG
10 SUPPOSITORY, RECTAL RECTAL DAILY PRN
Status: DISCONTINUED | OUTPATIENT
Start: 2025-03-11 | End: 2025-03-13 | Stop reason: HOSPADM

## 2025-03-11 RX ORDER — BENZONATATE 100 MG/1
100 CAPSULE ORAL ONCE
Status: COMPLETED | OUTPATIENT
Start: 2025-03-11 | End: 2025-03-11

## 2025-03-11 RX ORDER — AMOXICILLIN 250 MG
2 CAPSULE ORAL 2 TIMES DAILY PRN
Status: DISCONTINUED | OUTPATIENT
Start: 2025-03-11 | End: 2025-03-13 | Stop reason: HOSPADM

## 2025-03-11 RX ORDER — SODIUM CHLORIDE 0.9 % (FLUSH) 0.9 %
10 SYRINGE (ML) INJECTION AS NEEDED
Status: DISCONTINUED | OUTPATIENT
Start: 2025-03-11 | End: 2025-03-13 | Stop reason: HOSPADM

## 2025-03-11 RX ORDER — ACETAMINOPHEN 160 MG/5ML
650 SOLUTION ORAL EVERY 4 HOURS PRN
Status: DISCONTINUED | OUTPATIENT
Start: 2025-03-11 | End: 2025-03-13 | Stop reason: HOSPADM

## 2025-03-11 RX ORDER — LISINOPRIL 20 MG/1
20 TABLET ORAL DAILY
Status: DISCONTINUED | OUTPATIENT
Start: 2025-03-11 | End: 2025-03-13 | Stop reason: HOSPADM

## 2025-03-11 RX ORDER — ONDANSETRON 2 MG/ML
4 INJECTION INTRAMUSCULAR; INTRAVENOUS EVERY 6 HOURS PRN
Status: DISCONTINUED | OUTPATIENT
Start: 2025-03-11 | End: 2025-03-13 | Stop reason: HOSPADM

## 2025-03-11 RX ORDER — ONDANSETRON 4 MG/1
4 TABLET, ORALLY DISINTEGRATING ORAL EVERY 6 HOURS PRN
Status: DISCONTINUED | OUTPATIENT
Start: 2025-03-11 | End: 2025-03-13 | Stop reason: HOSPADM

## 2025-03-11 RX ORDER — BISACODYL 5 MG/1
5 TABLET, DELAYED RELEASE ORAL DAILY PRN
Status: DISCONTINUED | OUTPATIENT
Start: 2025-03-11 | End: 2025-03-13 | Stop reason: HOSPADM

## 2025-03-11 RX ORDER — ACETAMINOPHEN 500 MG
1000 TABLET ORAL ONCE
Status: COMPLETED | OUTPATIENT
Start: 2025-03-11 | End: 2025-03-11

## 2025-03-11 RX ORDER — METOPROLOL SUCCINATE 50 MG/1
50 TABLET, EXTENDED RELEASE ORAL
Status: DISCONTINUED | OUTPATIENT
Start: 2025-03-11 | End: 2025-03-13 | Stop reason: HOSPADM

## 2025-03-11 RX ADMIN — SERTRALINE HYDROCHLORIDE 50 MG: 50 TABLET, FILM COATED ORAL at 14:04

## 2025-03-11 RX ADMIN — SODIUM CHLORIDE, POTASSIUM CHLORIDE, SODIUM LACTATE AND CALCIUM CHLORIDE 100 ML/HR: 600; 310; 30; 20 INJECTION, SOLUTION INTRAVENOUS at 14:50

## 2025-03-11 RX ADMIN — ATORVASTATIN CALCIUM 80 MG: 20 TABLET, FILM COATED ORAL at 14:04

## 2025-03-11 RX ADMIN — ACETAMINOPHEN 1000 MG: 500 TABLET, FILM COATED ORAL at 05:34

## 2025-03-11 RX ADMIN — ASPIRIN 81 MG: 81 TABLET, COATED ORAL at 14:04

## 2025-03-11 RX ADMIN — METOPROLOL SUCCINATE 50 MG: 50 TABLET, EXTENDED RELEASE ORAL at 14:04

## 2025-03-11 RX ADMIN — ONDANSETRON 4 MG: 2 INJECTION, SOLUTION INTRAMUSCULAR; INTRAVENOUS at 22:16

## 2025-03-11 RX ADMIN — ACETAMINOPHEN 650 MG: 325 TABLET, FILM COATED ORAL at 14:04

## 2025-03-11 RX ADMIN — SODIUM CHLORIDE, SODIUM LACTATE, POTASSIUM CHLORIDE, AND CALCIUM CHLORIDE 1000 ML: .6; .31; .03; .02 INJECTION, SOLUTION INTRAVENOUS at 05:39

## 2025-03-11 RX ADMIN — ONDANSETRON 4 MG: 2 INJECTION, SOLUTION INTRAMUSCULAR; INTRAVENOUS at 05:36

## 2025-03-11 RX ADMIN — BENZONATATE 100 MG: 100 CAPSULE ORAL at 05:34

## 2025-03-11 RX ADMIN — ONDANSETRON 4 MG: 2 INJECTION, SOLUTION INTRAMUSCULAR; INTRAVENOUS at 14:04

## 2025-03-11 RX ADMIN — SODIUM CHLORIDE 500 MG: 9 INJECTION, SOLUTION INTRAVENOUS at 07:12

## 2025-03-11 RX ADMIN — CEFTRIAXONE SODIUM 1000 MG: 1 INJECTION, POWDER, FOR SOLUTION INTRAMUSCULAR; INTRAVENOUS at 06:32

## 2025-03-11 NOTE — ED PROVIDER NOTES
EMERGENCY DEPARTMENT ENCOUNTER    History  Chief Complaint   Patient presents with    Cough       History provided by: Patient    HPI:  Chief Complaint: Headache, cough and congestion, nausea and vomiting    Context: Pt is a 75 y.o. male who presents with the aforementioned acute symptoms.  Things seem to start yesterday morning.  He has had generalized headache, cough and congestion.  He notes that when he gets to coughing, he does have some nausea, vomiting more in the posttussive since.  Has some slight abdominal discomfort, but really only with coughing.  He was not aware he had any fever.  No sick contacts.      Active Ambulatory Problems     Diagnosis Date Noted    Benign positional vertigo     Hypertension     Hyperlipidemia     Coronary arteriosclerosis in native artery     GERD (gastroesophageal reflux disease)     Allergic rhinitis     Dream enactment behavior 10/21/2024    PAPITO (obstructive sleep apnea) 10/21/2024    Elevated glucose 01/18/2025    Encounter for screening for malignant neoplasm of colon 03/06/2025     Resolved Ambulatory Problems     Diagnosis Date Noted    Benign neoplasm of skin     Dyspnea 10/10/2018    Fatigue 10/10/2018    Acute bilateral low back pain without sciatica 01/31/2019    Other emphysema 04/13/2021    Encounter for screening for malignant neoplasm of colon 08/20/2021    Colon polyps 05/26/2022    Encounter for screening for malignant neoplasm of colon 08/20/2021     Past Medical History:   Diagnosis Date    Anxiety state     Arthritis     Coronary artery disease     Episodic mood disorder     MI (myocardial infarction)     Vertigo        Past Surgical History:   Procedure Laterality Date    COLONOSCOPY N/A 7/1/2022    Procedure: COLONOSCOPY TO CECUM/TI WITH COLD SNARE POLYPECTOMIES;  Surgeon: Jac Bonilla MD;  Location: [facility] ENDOSCOPY;  Service: Gastroenterology;  Laterality: N/A;  pre: PERSONAL HX OF POLYPS  post: NORMAL TI, POLYPS, DIVERTICULOSIS, HEMORRHOIDS     CORONARY ARTERY BYPASS GRAFT      2 vessel with stent    EYE SURGERY Left 11/01/2017       Physical Exam  ED Triage Vitals [03/11/25 0437]   Temp Heart Rate Resp BP SpO2   (!) 100.6 °F (38.1 °C) 119 20 150/84 95 %      Temp src Heart Rate Source Patient Position BP Location FiO2 (%)   -- -- -- -- --     Physical Exam  Constitutional:       Appearance: Normal appearance.   HENT:      Head: Atraumatic.   Eyes:      Conjunctiva/sclera: Conjunctivae normal.   Cardiovascular:      Rate and Rhythm: Regular rhythm. Tachycardia present.      Heart sounds: Murmur heard.   Pulmonary:      Effort: Pulmonary effort is normal. No respiratory distress.      Breath sounds: Rhonchi and rales present.   Abdominal:      Tenderness: There is no abdominal tenderness. There is no guarding or rebound.   Skin:     Capillary Refill: Capillary refill takes less than 2 seconds.   Neurological:      Mental Status: He is alert and oriented to person, place, and time.         Medical Decision Making:    Differential Diagnosis includes but not limited to: Influenza, COVID-19, community-acquired pneumonia    Medical Record Review: Reviewed yesterday's urgent care office visit note    Labs Results:  Recent Results (from the past 24 hours)   Covid-19 + Flu A&B AG, Veritor ([id number])    Collection Time: 03/10/25  4:58 PM    Specimen: Swab   Result Value Ref Range    COVID19 Not Detected     Influenza A Antigen MAURA Not Detected     Influenza B Antigen MAURA Not Detected     Internal Control Passed     Lot Number [id number]     Expiration Date 11.21.25    Comprehensive Metabolic Panel    Collection Time: 03/11/25  5:07 AM    Specimen: Blood   Result Value Ref Range    Glucose 156 (H) 65 - 99 mg/dL    BUN 18 8 - 23 mg/dL    Creatinine 1.06 0.76 - 1.27 mg/dL    Sodium 141 136 - 145 mmol/L    Potassium 3.5 3.5 - 5.2 mmol/L    Chloride 103 98 - 107 mmol/L    CO2 23.5 22.0 - 29.0 mmol/L    Calcium 9.0 8.6 - 10.5 mg/dL    Total Protein 6.7 6.0 - 8.5 g/dL     Albumin 3.9 3.5 - 5.2 g/dL    ALT (SGPT) 22 1 - 41 U/L    AST (SGOT) 25 1 - 40 U/L    Alkaline Phosphatase 90 39 - 117 U/L    Total Bilirubin 0.9 0.0 - 1.2 mg/dL    Globulin 2.8 gm/dL    A/G Ratio 1.4 g/dL    BUN/Creatinine Ratio 17.0 7.0 - 25.0    Anion Gap 14.5 5.0 - 15.0 mmol/L    eGFR 72.3 >60.0 mL/min/1.73   Lactic Acid, Plasma    Collection Time: 03/11/25  5:07 AM    Specimen: Blood   Result Value Ref Range    Lactate 2.9 (C) 0.5 - 2.0 mmol/L   Green Top (Gel)    Collection Time: 03/11/25  5:07 AM   Result Value Ref Range    Extra Tube Hold for add-ons.    Lavender Top    Collection Time: 03/11/25  5:07 AM   Result Value Ref Range    Extra Tube hold for add-on    Gold Top - SST    Collection Time: 03/11/25  5:07 AM   Result Value Ref Range    Extra Tube Hold for add-ons.    Light Blue Top    Collection Time: 03/11/25  5:07 AM   Result Value Ref Range    Extra Tube Hold for add-ons.    CBC Auto Differential    Collection Time: 03/11/25  5:07 AM    Specimen: Blood   Result Value Ref Range    WBC 8.73 3.40 - 10.80 10*3/mm3    RBC 4.87 4.14 - 5.80 10*6/mm3    Hemoglobin 14.5 13.0 - 17.7 g/dL    Hematocrit 43.6 37.5 - 51.0 %    MCV 89.5 79.0 - 97.0 fL    MCH 29.8 26.6 - 33.0 pg    MCHC 33.3 31.5 - 35.7 g/dL    RDW 14.4 12.3 - 15.4 %    RDW-SD 46.9 37.0 - 54.0 fl    MPV 10.3 6.0 - 12.0 fL    Platelets 189 140 - 450 10*3/mm3    Neutrophil % 87.5 (H) 42.7 - 76.0 %    Lymphocyte % 5.8 (L) 19.6 - 45.3 %    Monocyte % 5.4 5.0 - 12.0 %    Eosinophil % 0.8 0.3 - 6.2 %    Basophil % 0.3 0.0 - 1.5 %    Immature Grans % 0.2 0.0 - 0.5 %    Neutrophils, Absolute 7.63 (H) 1.70 - 7.00 10*3/mm3    Lymphocytes, Absolute 0.51 (L) 0.70 - 3.10 10*3/mm3    Monocytes, Absolute 0.47 0.10 - 0.90 10*3/mm3    Eosinophils, Absolute 0.07 0.00 - 0.40 10*3/mm3    Basophils, Absolute 0.03 0.00 - 0.20 10*3/mm3    Immature Grans, Absolute 0.02 0.00 - 0.05 10*3/mm3    nRBC 0.0 0.0 - 0.2 /100 WBC   Respiratory Panel PCR w/COVID-19(SARS-CoV-2)  IRAIDA/JESSICA/KATIA/PAD/COR/LEANDRA In-House, NP Swab in UTM/VTM, 2 HR TAT - Swab, Nasopharynx    Collection Time: 03/11/25  5:10 AM    Specimen: Nasopharynx; Swab   Result Value Ref Range    ADENOVIRUS, PCR Not Detected Not Detected    Coronavirus 229E Not Detected Not Detected    Coronavirus HKU1 Not Detected Not Detected    Coronavirus NL63 Not Detected Not Detected    Coronavirus OC43 Not Detected Not Detected    COVID19 Not Detected Not Detected - Ref. Range    Human Metapneumovirus Not Detected Not Detected    Human Rhinovirus/Enterovirus Not Detected Not Detected    Influenza A PCR Not Detected Not Detected    Influenza B PCR Not Detected Not Detected    Parainfluenza Virus 1 Not Detected Not Detected    Parainfluenza Virus 2 Not Detected Not Detected    Parainfluenza Virus 3 Not Detected Not Detected    Parainfluenza Virus 4 Not Detected Not Detected    RSV, PCR Not Detected Not Detected    Bordetella pertussis pcr Not Detected Not Detected    Bordetella parapertussis PCR Not Detected Not Detected    Chlamydophila pneumoniae PCR Not Detected Not Detected    Mycoplasma pneumo by PCR Not Detected Not Detected     Lab Comments:  My independent interpretation of the above labs: Mildly elevated lactic acid      Radiology:  XR Chest 1 View  Result Date: 3/11/2025  Patient: SUNSHINE FELIX  Time Out: 06:40 Exam(s): XR CXR 1 VIEW EXAM:   XR Chest, 1 View CLINICAL HISTORY:    Reason for exam: Simple Sepsis Protocol. TECHNIQUE:   Frontal view of the chest. COMPARISON:   No relevant prior studies available. FINDINGS:   Lungs:  Unremarkable.  No consolidation.  No acute cardiopulmonary process.   Pleural space:  Unremarkable.  No pneumothorax.   Heart:  Unremarkable.  No cardiomegaly.   Mediastinum:  Unremarkable.  Normal mediastinal contour.   Bones joints:  Unremarkable.  No acute fracture. IMPRESSION:       No acute cardiopulmonary process.     Electronically signed by Aleksandr Bullard MD on 03-11-25 at 0640    Radiology  Comments:  I ordered the above imaging and reviewed the results.    My independent interpretation of the chest x-ray: Bibasilar atelectasis        Medications given in ED:  Medications   sodium chloride 0.9 % flush 10 mL (has no administration in time range)   cefTRIAXone (ROCEPHIN) 1,000 mg in sodium chloride 0.9 % 100 mL MBP (1,000 mg Intravenous New Bag 3/11/25 0632)   azithromycin (ZITHROMAX) 500 mg in sodium chloride 0.9 % 250 mL IVPB-VTB (has no administration in time range)   lactated ringers bolus 1,000 mL (1,000 mL Intravenous New Bag 3/11/25 0539)   acetaminophen (TYLENOL) tablet 1,000 mg (1,000 mg Oral Given 3/11/25 0534)   benzonatate (TESSALON) capsule 100 mg (100 mg Oral Given 3/11/25 0534)   ondansetron (ZOFRAN) injection 4 mg (4 mg Intravenous Given 3/11/25 0536)         Rationale:  This is a 75-year-old male with multiple medical comorbidities who is presenting with complaints of cough and congestion.  He presents febrile to 100.6.  He has mild associated tachycardia.  Oxygen saturations have been borderline.     He is not really complaining of any chest pain or shortness of breath.  On exam, does have some rhonchi and rales bibasilarly.     He was evaluated with labs, which demonstrate a mild elevated lactic acid level.  Will give some fluids.    A respiratory pathogen panel did not reveal a viral source of symptoms.  Therefore given exam, history we will go ahead and started empirically on antibiotics.  Will give ceftriaxone today and azithromycin.  While chest x-ray is fairly benign, suspect this is an early pneumonia and given his borderline oxygen saturations, we will plan on admission for observation        Progress Notes:  6:42 AM EDT: I spoke with the patient about his ED work up and diagnosis. I informed the patient that he will be admitted for further evaluation/treatment. All questions answered.      Consult:  6:55 AM EDT: Discussed case with Dr Morse.  Reviewed history, exam, results  and treatments.  Will admit         Diagnosis:  Final diagnoses:   Community acquired pneumonia, unspecified laterality   Lactic acidosis         Parts of this note may be an electronic transcription/translation of spoken language to printed text using the Dragon dictation system        Provider Note Signed by:     Char Marks MD  03/11/25 0657

## 2025-03-11 NOTE — H&P
"    Patient Name:  Dwayne Keita  YOB: 1950  MRN:  [id number]  Admit Date:  3/11/2025  Patient Care Team:  Ivelisse Casarez APRN as PCP - General (Internal Medicine)      Subjective   History Present Illness     Chief Complaint   Patient presents with    Cough       Mr. Keita is a 75 y.o. male with CAD, HLD, HTN presenting with general malaise.  Patient states that for the past couple days he is felt \"sick.\"  He has had abdominal pain as well as cough and congestion.  He has thrown up several times and started having diarrhea earlier today.  He reports a decreased appetite.  No other sick contacts.    Review of Systems   Constitutional:  Positive for fatigue. Negative for chills.   Respiratory:  Positive for cough. Negative for shortness of breath.    Cardiovascular:  Negative for chest pain and palpitations.   Gastrointestinal:  Positive for diarrhea, nausea and vomiting. Negative for constipation.   Genitourinary:  Negative for dysuria and hematuria.   Neurological:  Positive for weakness. Negative for headaches.        Personal History     Past Medical History:   Diagnosis Date    Allergic rhinitis     Anxiety state     Arthritis     Benign neoplasm of skin     Benign positional vertigo     Coronary artery disease     Episodic mood disorder     GERD (gastroesophageal reflux disease)     Hyperlipidemia     Hypertension     MI (myocardial infarction)     2007    Vertigo      Past Surgical History:   Procedure Laterality Date    COLONOSCOPY N/A 7/1/2022    Procedure: COLONOSCOPY TO CECUM/TI WITH COLD SNARE POLYPECTOMIES;  Surgeon: Jac Bonilla MD;  Location: [facility] ENDOSCOPY;  Service: Gastroenterology;  Laterality: N/A;  pre: PERSONAL HX OF POLYPS  post: NORMAL TI, POLYPS, DIVERTICULOSIS, HEMORRHOIDS    CORONARY ARTERY BYPASS GRAFT      2 vessel with stent    EYE SURGERY Left 11/01/2017     Family History   Problem Relation Age of Onset    Thyroid disease Mother     Malig Hyperthermia Neg " Hx      Social History     Tobacco Use    Smoking status: Former     Current packs/day: 0.00     Average packs/day: 1.5 packs/day for 45.0 years (67.5 ttl pk-yrs)     Types: Cigarettes     Start date:      Quit date:      Years since quittin.2     Passive exposure: Never    Smokeless tobacco: Never   Vaping Use    Vaping status: Never Used   Substance Use Topics    Alcohol use: No    Drug use: Never     Current Facility-Administered Medications on File Prior to Encounter   Medication Dose Route Frequency Provider Last Rate Last Admin    [COMPLETED] ondansetron ODT (ZOFRAN-ODT) disintegrating tablet 4 mg  4 mg Oral Once Day, BELIA Rogers   4 mg at 03/10/25 1645    [COMPLETED] ondansetron ODT (ZOFRAN-ODT) disintegrating tablet 4 mg  4 mg Oral Once Day, BELIA Rogers   4 mg at 03/10/25 1646     Current Outpatient Medications on File Prior to Encounter   Medication Sig Dispense Refill    aspirin 81 MG tablet Take 1 tablet by mouth Daily.      cholecalciferol (VITAMIN D3) 10 MCG (400 UNIT) tablet Take 1 tablet by mouth Daily.      clindamycin 1 % gel Every other day      acetaminophen (TYLENOL) 500 MG tablet Take 1 tablet by mouth Every 6 (Six) Hours As Needed for Mild Pain. 30 tablet 0    atorvastatin (LIPITOR) 80 MG tablet Take 1 tablet by mouth Daily. 90 tablet 1    Cetirizine HCl (ZyrTEC Allergy) 10 MG capsule Take  by mouth.      diphenhydrAMINE-acetaminophen (TYLENOL PM)  MG tablet per tablet Take 1 tablet by mouth At Night As Needed for Sleep.      isosorbide mononitrate (IMDUR) 30 MG 24 hr tablet Take 1 tablet by mouth Daily. 90 tablet 1    lisinopril (PRINIVIL,ZESTRIL) 20 MG tablet Take 1 tablet by mouth Daily. 90 tablet 1    meclizine (ANTIVERT) 12.5 MG tablet Take 1 tablet by mouth 3 (Three) Times a Day As Needed for Dizziness. 30 tablet 0    metoprolol succinate XL (TOPROL-XL) 50 MG 24 hr tablet Take 1 tablet by mouth daily 90 tablet 1    NEXIUM 24HR 20 MG tablet delayed-release        nitroglycerin (NITROSTAT) 0.4 MG SL tablet Place 1 tablet under the tongue Every 5 (Five) Minutes As Needed. (Patient not taking: Reported on 1/15/2025)      Omega-3 Fatty Acids (FISH OIL PO) Take 1 capsule by mouth Daily.      ondansetron ODT (ZOFRAN-ODT) 8 MG disintegrating tablet Place 1 tablet on the tongue Every 12 (Twelve) Hours As Needed for Nausea or Vomiting. 10 tablet 0    sertraline (ZOLOFT) 100 MG tablet Take 0.5 tablets by mouth Daily. 45 tablet 1     No Known Allergies    Objective    Objective     Vital Signs  Temp:  [97.9 °F (36.6 °C)-100.6 °F (38.1 °C)] 98.3 °F (36.8 °C)  Heart Rate:  [] 85  Resp:  [20] 20  BP: (123-150)/(42-98) 141/73  SpO2:  [93 %-95 %] 93 %  on   ;   Device (Oxygen Therapy): room air  There is no height or weight on file to calculate BMI.    Physical Exam  Constitutional:       Appearance: He is ill-appearing.   Cardiovascular:      Rate and Rhythm: Normal rate.   Pulmonary:      Effort: Pulmonary effort is normal. No respiratory distress.      Breath sounds: No stridor.   Abdominal:      General: Abdomen is flat.      Tenderness: There is abdominal tenderness (Epigastric).   Musculoskeletal:      Right lower leg: No edema.      Left lower leg: No edema.   Skin:     Coloration: Skin is not pale.   Neurological:      Mental Status: He is alert and oriented to person, place, and time.         Results Review:    I have personally reviewed:  [x]  Laboratory  [x]  Old records  [x]  Radiology   []  EKG/Telemetry   [x]  Microbiology    []  Cardiology/Vascular   []  Pathology     Lab Results (last 24 hours)       Procedure Component Value Units Date/Time    Covid-19 + Flu A&B AG, Veritor (RZU8800) [[id number]]  (Normal) Collected: 03/10/25 1658    Specimen: Swab Updated: 03/10/25 1658     COVID19 Not Detected     Influenza A Antigen MAURA Not Detected     Influenza B Antigen MAURA Not Detected     Internal Control Passed     Lot Number [id number]     Expiration Date 11.21.25    CBC &  Differential [[id number]]  (Abnormal) Collected: 03/11/25 0507    Specimen: Blood Updated: 03/11/25 0520    Narrative:      The following orders were created for panel order CBC & Differential.  Procedure                               Abnormality         Status                     ---------                               -----------         ------                     CBC Auto Differential[[id number]]        Abnormal            Final result                 Please view results for these tests on the individual orders.    Comprehensive Metabolic Panel [[id number]]  (Abnormal) Collected: 03/11/25 0507    Specimen: Blood Updated: 03/11/25 0541     Glucose 156 mg/dL      BUN 18 mg/dL      Creatinine 1.06 mg/dL      Sodium 141 mmol/L      Potassium 3.5 mmol/L      Chloride 103 mmol/L      CO2 23.5 mmol/L      Calcium 9.0 mg/dL      Total Protein 6.7 g/dL      Albumin 3.9 g/dL      ALT (SGPT) 22 U/L      AST (SGOT) 25 U/L      Alkaline Phosphatase 90 U/L      Total Bilirubin 0.9 mg/dL      Globulin 2.8 gm/dL      A/G Ratio 1.4 g/dL      BUN/Creatinine Ratio 17.0     Anion Gap 14.5 mmol/L      eGFR 72.3 mL/min/1.73     Narrative:      GFR Categories in Chronic Kidney Disease (CKD)      GFR Category          GFR (mL/min/1.73)    Interpretation  G1                     90 or greater         Normal or high (1)  G2                      60-89                Mild decrease (1)  G3a                   45-59                Mild to moderate decrease  G3b                   30-44                Moderate to severe decrease  G4                    15-29                Severe decrease  G5                    14 or less           Kidney failure          (1)In the absence of evidence of kidney disease, neither GFR category G1 or G2 fulfill the criteria for CKD.    eGFR calculation 2021 CKD-EPI creatinine equation, which does not include race as a factor    Lactic Acid, Plasma [[id number]]  (Abnormal) Collected: 03/11/25 0507    Specimen: Blood  "Updated: 03/11/25 0539     Lactate 2.9 mmol/L     CBC Auto Differential [[id number]]  (Abnormal) Collected: 03/11/25 0507    Specimen: Blood Updated: 03/11/25 0520     WBC 8.73 10*3/mm3      RBC 4.87 10*6/mm3      Hemoglobin 14.5 g/dL      Hematocrit 43.6 %      MCV 89.5 fL      MCH 29.8 pg      MCHC 33.3 g/dL      RDW 14.4 %      RDW-SD 46.9 fl      MPV 10.3 fL      Platelets 189 10*3/mm3      Neutrophil % 87.5 %      Lymphocyte % 5.8 %      Monocyte % 5.4 %      Eosinophil % 0.8 %      Basophil % 0.3 %      Immature Grans % 0.2 %      Neutrophils, Absolute 7.63 10*3/mm3      Lymphocytes, Absolute 0.51 10*3/mm3      Monocytes, Absolute 0.47 10*3/mm3      Eosinophils, Absolute 0.07 10*3/mm3      Basophils, Absolute 0.03 10*3/mm3      Immature Grans, Absolute 0.02 10*3/mm3      nRBC 0.0 /100 WBC     Procalcitonin [[id number]]  (Abnormal) Collected: 03/11/25 0507    Specimen: Blood Updated: 03/11/25 0743     Procalcitonin 0.28 ng/mL     Narrative:      As a Marker for Sepsis (Non-Neonates):    1. <0.5 ng/mL represents a low risk of severe sepsis and/or septic shock.  2. >2 ng/mL represents a high risk of severe sepsis and/or septic shock.    As a Marker for Lower Respiratory Tract Infections that require antibiotic therapy:    PCT on Admission    Antibiotic Therapy       6-12 Hrs later    >0.5                Strongly Recommended  >0.25 - <0.5        Recommended   0.1 - 0.25          Discouraged              Remeasure/reassess PCT  <0.1                Strongly Discouraged     Remeasure/reassess PCT    As 28 day mortality risk marker: \"Change in Procalcitonin Result\" (>80% or <=80%) if Day 0 (or Day 1) and Day 4 values are available. Refer to http://www.Image Metric[employer]-pct-calculator.com    Change in PCT <=80%  A decrease of PCT levels below or equal to 80% defines a positive change in PCT test result representing a higher risk for 28-day all-cause mortality of patients diagnosed with severe sepsis for septic shock.    Change in " PCT >80%  A decrease of PCT levels of more than 80% defines a negative change in PCT result representing a lower risk for 28-day all-cause mortality of patients diagnosed with severe sepsis or septic shock.       Respiratory Panel PCR w/COVID-19(SARS-CoV-2) IRAIDA/JESSICA/KATIA/PAD/COR/LEANDRA In-House, NP Swab in UTM/VTM, 2 HR TAT - Swab, Nasopharynx [[id number]]  (Normal) Collected: 03/11/25 0510    Specimen: Swab from Nasopharynx Updated: 03/11/25 0607     ADENOVIRUS, PCR Not Detected     Coronavirus 229E Not Detected     Coronavirus HKU1 Not Detected     Coronavirus NL63 Not Detected     Coronavirus OC43 Not Detected     COVID19 Not Detected     Human Metapneumovirus Not Detected     Human Rhinovirus/Enterovirus Not Detected     Influenza A PCR Not Detected     Influenza B PCR Not Detected     Parainfluenza Virus 1 Not Detected     Parainfluenza Virus 2 Not Detected     Parainfluenza Virus 3 Not Detected     Parainfluenza Virus 4 Not Detected     RSV, PCR Not Detected     Bordetella pertussis pcr Not Detected     Bordetella parapertussis PCR Not Detected     Chlamydophila pneumoniae PCR Not Detected     Mycoplasma pneumo by PCR Not Detected    Narrative:      In the setting of a positive respiratory panel with a viral infection PLUS a negative procalcitonin without other underlying concern for bacterial infection, consider observing off antibiotics or discontinuation of antibiotics and continue supportive care. If the respiratory panel is positive for atypical bacterial infection (Bordetella pertussis, Chlamydophila pneumoniae, or Mycoplasma pneumoniae), consider antibiotic de-escalation to target atypical bacterial infection.    Blood Culture - Blood, Arm, Right [[id number]] Collected: 03/11/25 0523    Specimen: Blood from Arm, Right Updated: 03/11/25 0530    Blood Culture - Blood, Arm, Left [[id number]] Collected: 03/11/25 0523    Specimen: Blood from Arm, Left Updated: 03/11/25 0531    STAT Lactic Acid, Reflex [[id number]]   (Normal) Collected: 03/11/25 0929    Specimen: Blood Updated: 03/11/25 1002     Lactate 1.9 mmol/L             Imaging Results (Last 24 Hours)       Procedure Component Value Units Date/Time    XR Chest 1 View [[id number]] Collected: 03/11/25 0641     Updated: 03/11/25 0641    Narrative:        Patient: SUNSHINE FELIX  Time Out: 06:40  Exam(s): XR CXR 1 VIEW     EXAM:    XR Chest, 1 View    CLINICAL HISTORY:     Reason for exam: Simple Sepsis Protocol.    TECHNIQUE:    Frontal view of the chest.    COMPARISON:    No relevant prior studies available.    FINDINGS:    Lungs:  Unremarkable.  No consolidation.  No acute cardiopulmonary   process.    Pleural space:  Unremarkable.  No pneumothorax.    Heart:  Unremarkable.  No cardiomegaly.    Mediastinum:  Unremarkable.  Normal mediastinal contour.    Bones joints:  Unremarkable.  No acute fracture.    IMPRESSION:         No acute cardiopulmonary process.      Impression:          Electronically signed by Aleksandr Bullard MD on 03-11-25 at 0640                No orders to display        Assessment/Plan     Active Hospital Problems    Diagnosis  POA    **Pneumonia [J18.9]  Yes    PAPITO (obstructive sleep apnea) [G47.33]  Yes    Hypertension [I10]  Yes    Hyperlipidemia [E78.5]  Yes    Coronary arteriosclerosis in native artery [I25.10]  Yes      Resolved Hospital Problems   No resolved problems to display.       Mr. Felix is a 75 y.o. male with CAD, HLD, HTN presenting with general malaise.      Lactic acidosis/elevated procalcitonin/fever  Nausea/vomiting/diarrhea  Generalized weakness  -CXR unremarkable  -Check urine Legionella, strep antigens  -Blood culture pending  -Sounds consistent with gastroenteritis, will check GI PCR  -Given presentation started on empiric Rocephin, will continue pending workup  -IVF  -consider PT consult in am pending progress    CAD  HTN  HLD  -ASA, statin  -Imdur, metoprolol; lisinopril held on admit, resume when appropriate      I discussed  the patient's findings and my recommendations with patient, nursing staff, and ED provider.    VTE Prophylaxis - SCDs.  Code Status - Full code.       Rory Morse MD  [facility]  03/11/25  14:06 EDT

## 2025-03-11 NOTE — ED NOTES
Thank you for letting us care for you during your recent visit at [facility]. We would love to hear about your experience with us.         We’re always looking for ways to make our patients’ experiences even better. Do you have any recommendations on ways we may improve?         I appreciate you taking the time to respond. Please be on the lookout for a survey about your recent visit from [facility] via text or email. We would greatly appreciate if you could fill that out and turn it back in. We want your voice to be heard and we value your feedback.         Thank you for choosing [facility] for your healthcare needs.

## 2025-03-11 NOTE — PROGRESS NOTES
Discharge Planning Assessment  [facility]     Patient Name: Dwayne Keita  MRN: [id number]  Today's Date: 3/11/2025    Admit Date: 3/11/2025    Plan: pending   Discharge Needs Assessment    No documentation.                  Discharge Plan       Row Name 03/11/25 1145       Plan    Plan pending    Plan Comments The patient transferred from ER on 3/11/25 @ 08:11. CCP will screen and follow for any needs. CARMENCITA Ruffin RN, CCP.                    Expected Discharge Date and Time       Expected Discharge Date Expected Discharge Time    Mar 14, 2025            Demographic Summary    No documentation.                  Functional Status    No documentation.                  Psychosocial    No documentation.                  Abuse/Neglect    No documentation.                  Legal    No documentation.                  Substance Abuse    No documentation.                  Patient Forms    No documentation.                     Maria Antonia Ruffin, RN

## 2025-03-11 NOTE — ED NOTES
Nursing report ED to floor  [facility] PATRICA Keita  75 y.o.  male    HPI :  HPI  Stated Reason for Visit: patient from home reports coughing so hard he is vomiting since yesterday morning.  History Obtained From: EMS    Chief Complaint  Chief Complaint   Patient presents with    Cough       Admitting doctor:   Rory Morse MD    Admitting diagnosis:   The primary encounter diagnosis was Community acquired pneumonia, unspecified laterality. A diagnosis of Lactic acidosis was also pertinent to this visit.    Code status:   Current Code Status       Date Active Code Status Order ID Comments User Context       Not on file            Allergies:   Patient has no known allergies.    Isolation:   No active isolations    Intake and Output  No intake or output data in the 24 hours ending 03/11/25 0711    Weight:   There were no vitals filed for this visit.    Most recent vitals:   Vitals:    03/11/25 0528 03/11/25 0531 03/11/25 0601 03/11/25 0637   BP:  138/98 136/64 123/42   Pulse: 98   85   Resp:       Temp:       SpO2: 93%   93%       Active LDAs/IV Access:   Lines, Drains & Airways       Active LDAs       Name Placement date Placement time Site Days    Peripheral IV 03/11/25 Anterior;Distal;Left;Upper Arm 03/11/25  --  Arm  less than 1    Peripheral IV 03/11/25 0631 Right Antecubital 03/11/25  0631  Antecubital  less than 1                    Labs (abnormal labs have a star):   Labs Reviewed   COMPREHENSIVE METABOLIC PANEL - Abnormal; Notable for the following components:       Result Value    Glucose 156 (*)     All other components within normal limits    Narrative:     GFR Categories in Chronic Kidney Disease (CKD)      GFR Category          GFR (mL/min/1.73)    Interpretation  G1                     90 or greater         Normal or high (1)  G2                      60-89                Mild decrease (1)  G3a                   45-59                Mild to moderate decrease  G3b                   30-44                 Moderate to severe decrease  G4                    15-29                Severe decrease  G5                    14 or less           Kidney failure          (1)In the absence of evidence of kidney disease, neither GFR category G1 or G2 fulfill the criteria for CKD.    eGFR calculation 2021 CKD-EPI creatinine equation, which does not include race as a factor   LACTIC ACID, PLASMA - Abnormal; Notable for the following components:    Lactate 2.9 (*)     All other components within normal limits   CBC WITH AUTO DIFFERENTIAL - Abnormal; Notable for the following components:    Neutrophil % 87.5 (*)     Lymphocyte % 5.8 (*)     Neutrophils, Absolute 7.63 (*)     Lymphocytes, Absolute 0.51 (*)     All other components within normal limits   RESPIRATORY PANEL PCR W/ COVID-19 (SARS-COV-2), NP SWAB IN UTM/VTP, 2 HR TAT - Normal    Narrative:     In the setting of a positive respiratory panel with a viral infection PLUS a negative procalcitonin without other underlying concern for bacterial infection, consider observing off antibiotics or discontinuation of antibiotics and continue supportive care. If the respiratory panel is positive for atypical bacterial infection (Bordetella pertussis, Chlamydophila pneumoniae, or Mycoplasma pneumoniae), consider antibiotic de-escalation to target atypical bacterial infection.   BLOOD CULTURE   BLOOD CULTURE   RAINBOW DRAW    Narrative:     The following orders were created for panel order [place] Draw.  Procedure                               Abnormality         Status                     ---------                               -----------         ------                     Green Top (Gel)[[id number]]                                  Final result               Lavender Top[[id number]]                                     Final result               Gold Top - SST[[id number]]                                   Final result               Light Blue Top[[id number]]                                    Final result                 Please view results for these tests on the individual orders.   LACTIC ACID, REFLEX   CBC AND DIFFERENTIAL    Narrative:     The following orders were created for panel order CBC & Differential.  Procedure                               Abnormality         Status                     ---------                               -----------         ------                     CBC Auto Differential[[id number]]        Abnormal            Final result                 Please view results for these tests on the individual orders.   GREEN TOP   LAVENDER TOP   GOLD TOP - SST   LIGHT BLUE TOP       EKG:   No orders to display       Meds given in ED:   Medications   sodium chloride 0.9 % flush 10 mL (has no administration in time range)   azithromycin (ZITHROMAX) 500 mg in sodium chloride 0.9 % 250 mL IVPB-VTB (has no administration in time range)   lactated ringers bolus 1,000 mL (0 mL Intravenous Stopped 3/11/25 0707)   acetaminophen (TYLENOL) tablet 1,000 mg (1,000 mg Oral Given 3/11/25 0534)   benzonatate (TESSALON) capsule 100 mg (100 mg Oral Given 3/11/25 0534)   ondansetron (ZOFRAN) injection 4 mg (4 mg Intravenous Given 3/11/25 0536)   cefTRIAXone (ROCEPHIN) 1,000 mg in sodium chloride 0.9 % 100 mL MBP (1,000 mg Intravenous New Bag 3/11/25 0632)       Imaging results:  XR Chest 1 View  Result Date: 3/11/2025  Electronically signed by Aleksandr Bullard MD on 25 at 0640      Ambulatory status:   - assist    Social issues:   Social History     Socioeconomic History    Marital status:    Tobacco Use    Smoking status: Former     Current packs/day: 0.00     Average packs/day: 1.5 packs/day for 45.0 years (67.5 ttl pk-yrs)     Types: Cigarettes     Start date:      Quit date:      Years since quittin.2     Passive exposure: Never    Smokeless tobacco: Never   Vaping Use    Vaping status: Never Used   Substance and Sexual Activity    Alcohol use: No    Drug use: Never    Sexual  activity: Defer       Peripheral Neurovascular  Peripheral Neurovascular (Adult)  Peripheral Neurovascular WDL: WDL    Neuro Cognitive  Neuro Cognitive (Adult)  Cognitive/Neuro/Behavioral WDL: WDL    Learning  Learning Assessment  Learning Readiness and Ability: no barriers identified    Respiratory  Respiratory  Airway WDL: WDL  Respiratory WDL  Respiratory WDL: .WDL except, all, cough  Rhythm/Pattern, Respiratory: shortness of breath  Cough Type: productive    Abdominal Pain       Pain Assessments  Pain (Adult)  (0-10) Pain Rating: Rest: 4  (0-10) Pain Rating: Activity: 10  Pain Location: abdomen, head  Response to Pain Interventions: interventions effective per patient    NIH Stroke Scale       Elvia Yoon RN  03/11/25 07:11 EDT

## 2025-03-12 PROBLEM — A08.11 NOROVIRUS: Status: ACTIVE | Noted: 2025-03-11

## 2025-03-12 LAB
ANION GAP SERPL CALCULATED.3IONS-SCNC: 11 MMOL/L (ref 5–15)
BUN SERPL-MCNC: 16 MG/DL (ref 8–23)
BUN/CREAT SERPL: 19.8 (ref 7–25)
CALCIUM SPEC-SCNC: 9 MG/DL (ref 8.6–10.5)
CHLORIDE SERPL-SCNC: 104 MMOL/L (ref 98–107)
CO2 SERPL-SCNC: 25 MMOL/L (ref 22–29)
CREAT SERPL-MCNC: 0.81 MG/DL (ref 0.76–1.27)
DEPRECATED RDW RBC AUTO: 43.5 FL (ref 37–54)
EGFRCR SERPLBLD CKD-EPI 2021: 90.8 ML/MIN/1.73
ERYTHROCYTE [DISTWIDTH] IN BLOOD BY AUTOMATED COUNT: 13.6 % (ref 12.3–15.4)
GLUCOSE SERPL-MCNC: 83 MG/DL (ref 65–99)
HCT VFR BLD AUTO: 40.3 % (ref 37.5–51)
HGB BLD-MCNC: 13.6 G/DL (ref 13–17.7)
MCH RBC QN AUTO: 29.6 PG (ref 26.6–33)
MCHC RBC AUTO-ENTMCNC: 33.7 G/DL (ref 31.5–35.7)
MCV RBC AUTO: 87.8 FL (ref 79–97)
PLATELET # BLD AUTO: 137 10*3/MM3 (ref 140–450)
PMV BLD AUTO: 9.4 FL (ref 6–12)
POTASSIUM SERPL-SCNC: 3.5 MMOL/L (ref 3.5–5.2)
RBC # BLD AUTO: 4.59 10*6/MM3 (ref 4.14–5.8)
SODIUM SERPL-SCNC: 140 MMOL/L (ref 136–145)
WBC NRBC COR # BLD AUTO: 4.36 10*3/MM3 (ref 3.4–10.8)

## 2025-03-12 PROCEDURE — 63710000001 ONDANSETRON ODT 4 MG TABLET DISPERSIBLE: Performed by: STUDENT IN AN ORGANIZED HEALTH CARE EDUCATION/TRAINING PROGRAM

## 2025-03-12 PROCEDURE — 85027 COMPLETE CBC AUTOMATED: CPT | Performed by: STUDENT IN AN ORGANIZED HEALTH CARE EDUCATION/TRAINING PROGRAM

## 2025-03-12 PROCEDURE — 96376 TX/PRO/DX INJ SAME DRUG ADON: CPT

## 2025-03-12 PROCEDURE — G0378 HOSPITAL OBSERVATION PER HR: HCPCS

## 2025-03-12 PROCEDURE — 80048 BASIC METABOLIC PNL TOTAL CA: CPT | Performed by: STUDENT IN AN ORGANIZED HEALTH CARE EDUCATION/TRAINING PROGRAM

## 2025-03-12 PROCEDURE — 25010000002 ONDANSETRON PER 1 MG: Performed by: STUDENT IN AN ORGANIZED HEALTH CARE EDUCATION/TRAINING PROGRAM

## 2025-03-12 RX ADMIN — ONDANSETRON 4 MG: 2 INJECTION, SOLUTION INTRAMUSCULAR; INTRAVENOUS at 06:36

## 2025-03-12 RX ADMIN — ACETAMINOPHEN 650 MG: 325 TABLET, FILM COATED ORAL at 15:03

## 2025-03-12 RX ADMIN — ONDANSETRON 4 MG: 4 TABLET, ORALLY DISINTEGRATING ORAL at 15:03

## 2025-03-12 RX ADMIN — METOPROLOL SUCCINATE 50 MG: 50 TABLET, EXTENDED RELEASE ORAL at 09:15

## 2025-03-12 RX ADMIN — ISOSORBIDE MONONITRATE 30 MG: 30 TABLET, EXTENDED RELEASE ORAL at 09:16

## 2025-03-12 RX ADMIN — SERTRALINE HYDROCHLORIDE 50 MG: 50 TABLET, FILM COATED ORAL at 09:15

## 2025-03-12 RX ADMIN — ACETAMINOPHEN 650 MG: 325 TABLET, FILM COATED ORAL at 21:12

## 2025-03-12 RX ADMIN — ATORVASTATIN CALCIUM 80 MG: 20 TABLET, FILM COATED ORAL at 09:16

## 2025-03-12 RX ADMIN — PANTOPRAZOLE SODIUM 40 MG: 40 TABLET, DELAYED RELEASE ORAL at 06:36

## 2025-03-12 RX ADMIN — ASPIRIN 81 MG: 81 TABLET, COATED ORAL at 09:15

## 2025-03-12 NOTE — PLAN OF CARE
Goal Outcome Evaluation:            Patient has been up ad andrew in room, nausea and headache treated per MAR. Patient is overall feeling better, appetite increased as shift progressed. Contact for norovirus. Possible DC tomorrow. Will cont to monitor

## 2025-03-12 NOTE — PROGRESS NOTES
"    Name: Dwayne Keita ADMIT: 3/11/2025   : 1950  PCP: Ivelisse Casarez APRN    MRN: [id number] LOS: 0 days   AGE/SEX: 75 y.o. male  ROOM: [facility]     Subjective   Subjective   He is feeling better today, appetite has improved some.  Less diarrhea overall.    Objective   Objective   Vital Signs  Temp:  [97.1 °F (36.2 °C)-98.3 °F (36.8 °C)] 97.1 °F (36.2 °C)  Heart Rate:  [69-85] 69  Resp:  [16-20] 16  BP: (130-141)/(66-92) 132/66  SpO2:  [93 %-96 %] 94 %  on  Flow (L/min) (Oxygen Therapy):  [2] 2;   Device (Oxygen Therapy): room air  There is no height or weight on file to calculate BMI.  Physical Exam  Constitutional:       Appearance: He is ill-appearing.   Pulmonary:      Effort: Pulmonary effort is normal. No respiratory distress.      Breath sounds: No stridor.   Abdominal:      Tenderness: There is abdominal tenderness (Epigastric).   Skin:     Coloration: Skin is not pale.   Neurological:      Mental Status: He is alert and oriented to person, place, and time.         Results Review     I reviewed the patient's new clinical results.  Results from last 7 days   Lab Units 25  0459 25  0507   WBC 10*3/mm3 4.36 8.73   HEMOGLOBIN g/dL 13.6 14.5   PLATELETS 10*3/mm3 137* 189     Results from last 7 days   Lab Units 25  0459 25  0507   SODIUM mmol/L 140 141   POTASSIUM mmol/L 3.5 3.5   CHLORIDE mmol/L 104 103   CO2 mmol/L 25.0 23.5   BUN mg/dL 16 18   CREATININE mg/dL 0.81 1.06   GLUCOSE mg/dL 83 156*   EGFR mL/min/1.73 90.8 72.3     Results from last 7 days   Lab Units 25  0507   ALBUMIN g/dL 3.9   BILIRUBIN mg/dL 0.9   ALK PHOS U/L 90   AST (SGOT) U/L 25   ALT (SGPT) U/L 22     Results from last 7 days   Lab Units 25  0459 25  0507   CALCIUM mg/dL 9.0 9.0   ALBUMIN g/dL  --  3.9     Results from last 7 days   Lab Units 25  0929 25  0507   PROCALCITONIN ng/mL  --  0.28*   LACTATE mmol/L 1.9 2.9*     No results found for: \"HGBA1C\", \"POCGLU\"    XR " Chest 1 View  Result Date: 3/11/2025  Electronically signed by Aleksandr Bullard MD on 03-11-25 at 0640    Scheduled Medications  aspirin, 81 mg, Oral, Daily  atorvastatin, 80 mg, Oral, Daily  isosorbide mononitrate, 30 mg, Oral, Daily  [Held by provider] lisinopril, 20 mg, Oral, Daily  metoprolol succinate XL, 50 mg, Oral, Q24H  pantoprazole, 40 mg, Oral, Q AM  sertraline, 50 mg, Oral, Daily    Infusions   Diet  Diet: Regular/House; Fluid Consistency: Thin (IDDSI 0)       Assessment/Plan     Active Hospital Problems    Diagnosis  POA    **Norovirus [A08.11]  Yes    Nausea and vomiting [R11.2]  Yes    Diarrhea [R19.7]  Yes    PAPITO (obstructive sleep apnea) [G47.33]  Yes    Hypertension [I10]  Yes    Hyperlipidemia [E78.5]  Yes    Coronary arteriosclerosis in native artery [I25.10]  Yes      Resolved Hospital Problems   No resolved problems to display.       75 y.o. male admitted with Norovirus.      03/12/25  Discontinued antibiotics as presentation consistent with norovirus gastroenteritis.  Will plan to monitor tonight, if his p.o. intake increases likely discharge in the morning.    Norovirus gastroenteritis  Lactic acidosis/elevated procalcitonin/fever  Nausea/vomiting/diarrhea  Generalized weakness  -CXR unremarkable  -urine Legionella, strep antigens negative  -Blood culture NGTD  -GI PCR with norovirus  -DC Rocephin     CAD  HTN  HLD  -ASA, statin  -Imdur, metoprolol; lisinopril held on admit, resume when appropriate    Flow (L/min) (Oxygen Therapy):  [2] 2    DVT prophylaxis: SCDs  Discussed with patient.  Anticipated discharge home, tomorrow            Rory Morse MD  [facility]  03/12/25  08:54 EDT

## 2025-03-13 ENCOUNTER — READMISSION MANAGEMENT (OUTPATIENT)
Dept: CALL CENTER | Facility: HOSPITAL | Age: 75
End: 2025-03-13
Payer: MEDICARE

## 2025-03-13 VITALS
TEMPERATURE: 96.8 F | RESPIRATION RATE: 16 BRPM | OXYGEN SATURATION: 95 % | HEART RATE: 65 BPM | SYSTOLIC BLOOD PRESSURE: 146 MMHG | DIASTOLIC BLOOD PRESSURE: 70 MMHG

## 2025-03-13 PROBLEM — R11.2 NAUSEA AND VOMITING: Status: RESOLVED | Noted: 2025-03-11 | Resolved: 2025-03-13

## 2025-03-13 PROBLEM — R19.7 DIARRHEA: Status: RESOLVED | Noted: 2025-03-11 | Resolved: 2025-03-13

## 2025-03-13 PROCEDURE — 63710000001 ONDANSETRON ODT 4 MG TABLET DISPERSIBLE: Performed by: STUDENT IN AN ORGANIZED HEALTH CARE EDUCATION/TRAINING PROGRAM

## 2025-03-13 PROCEDURE — G0378 HOSPITAL OBSERVATION PER HR: HCPCS

## 2025-03-13 RX ADMIN — PANTOPRAZOLE SODIUM 40 MG: 40 TABLET, DELAYED RELEASE ORAL at 05:19

## 2025-03-13 RX ADMIN — ISOSORBIDE MONONITRATE 30 MG: 30 TABLET, EXTENDED RELEASE ORAL at 09:14

## 2025-03-13 RX ADMIN — METOPROLOL SUCCINATE 50 MG: 50 TABLET, EXTENDED RELEASE ORAL at 09:14

## 2025-03-13 RX ADMIN — ASPIRIN 81 MG: 81 TABLET, COATED ORAL at 09:14

## 2025-03-13 RX ADMIN — ATORVASTATIN CALCIUM 80 MG: 20 TABLET, FILM COATED ORAL at 09:14

## 2025-03-13 RX ADMIN — SERTRALINE HYDROCHLORIDE 50 MG: 50 TABLET, FILM COATED ORAL at 09:14

## 2025-03-13 RX ADMIN — ONDANSETRON 4 MG: 4 TABLET, ORALLY DISINTEGRATING ORAL at 09:22

## 2025-03-13 NOTE — PROGRESS NOTES
Case Management Discharge Note      Final Note: The patient is discharged to home with no needs. CARMENCITA Ruffin Rn, CCP         Selected Continued Care - Admitted Since 3/11/2025       Destination    No services have been selected for the patient.                Durable Medical Equipment    No services have been selected for the patient.                Dialysis/Infusion    No services have been selected for the patient.                Home Medical Care    No services have been selected for the patient.                Therapy    No services have been selected for the patient.                Community Resources    No services have been selected for the patient.                Community & DME    No services have been selected for the patient.                    Transportation Services  Private: Car    Final Discharge Disposition Code: 01 - home or self-care

## 2025-03-13 NOTE — DISCHARGE SUMMARY
"    Patient Name: Dwayne Keita  : 1950  MRN: [id number]    Date of Admission: 3/11/2025  Date of Discharge:  3/13/2025  Primary Care Physician: Ivelisse Casarez APRN      Chief Complaint:   Cough      Discharge Diagnoses     Active Hospital Problems    Diagnosis  POA    **Norovirus [A08.11]  Yes    PAPITO (obstructive sleep apnea) [G47.33]  Yes    Hypertension [I10]  Yes    Hyperlipidemia [E78.5]  Yes    Coronary arteriosclerosis in native artery [I25.10]  Yes      Resolved Hospital Problems    Diagnosis Date Resolved POA    Nausea and vomiting [R11.2] 2025 Yes    Diarrhea [R19.7] 2025 Yes        Admitting HPI     \"Mr. Keita is a 75 y.o. male with CAD, HLD, HTN presenting with general malaise.  Patient states that for the past couple days he is felt \"sick.\"  He has had abdominal pain as well as cough and congestion.  He has thrown up several times and started having diarrhea earlier today.  He reports a decreased appetite.  No other sick contacts. \"    Hospital Course     Pt admitted for general malaise, nausea, vomiting and diarrhea.  Meeting sepsis criteria on admission he was initially started on Rocephin for possible pneumonia.  Blood cultures negative.  GI PCR was obtained which did show norovirus and antibiotics were discontinued.  He has had improvement in all of his symptoms and is currently tolerating diet and feeling much better.  Suspect that he will continue to improve for the next few days.  He is ambulatory independently and stable for discharge home.    Discharge Plan     Norovirus gastroenteritis  Lactic acidosis/elevated procalcitonin/fever, resolved  Nausea/vomiting/diarrhea, resolved  Generalized weakness, resolved  -CXR unremarkable  -urine Legionella, strep antigens negative  -Blood culture NGTD  -GI PCR with norovirus  -abx dc'd  -encouraged adequate hydration     CAD  HTN  HLD  -ASA, statin  -Imdur, metoprolol, lisinopril     Day of Discharge     Physical Exam:  Temp:  " [96.8 °F (36 °C)-98.2 °F (36.8 °C)] 96.8 °F (36 °C)  Heart Rate:  [55-70] 65  Resp:  [16-18] 16  BP: (112-146)/(64-75) 146/70  There is no height or weight on file to calculate BMI.  Physical Exam  Constitutional:       Appearance: normal  Pulmonary:      Effort: Pulmonary effort is normal. No respiratory distress.      Breath sounds: No stridor.   Abdominal:      Tenderness: There is abdominal tenderness (Epigastric).   Skin:     Coloration: Skin is not pale.   Neurological:      Mental Status: He is alert and oriented to person, place, and time.   Consultants     Consult Orders (all) (From admission, onward)       Start     Ordered    03/11/25 0624  [facility] (on-call MD unless specified) Details  Once        Specialty:  Hospitalist  Provider:  (Not yet assigned)    03/11/25 0623                  Procedures     * Surgery not found *      Imaging Results (All)       Procedure Component Value Units Date/Time    XR Chest 1 View [[id number]] Collected: 03/11/25 0641     Updated: 03/11/25 0641    Narrative:        Patient: SUNSHINE FELIX  Time Out: 06:40  Exam(s): XR CXR 1 VIEW     EXAM:    XR Chest, 1 View    CLINICAL HISTORY:     Reason for exam: Simple Sepsis Protocol.    TECHNIQUE:    Frontal view of the chest.    COMPARISON:    No relevant prior studies available.    FINDINGS:    Lungs:  Unremarkable.  No consolidation.  No acute cardiopulmonary   process.    Pleural space:  Unremarkable.  No pneumothorax.    Heart:  Unremarkable.  No cardiomegaly.    Mediastinum:  Unremarkable.  Normal mediastinal contour.    Bones joints:  Unremarkable.  No acute fracture.    IMPRESSION:         No acute cardiopulmonary process.      Impression:          Electronically signed by Aleksandr Bullard MD on 03-11-25 at 0640          Results for orders placed during the hospital encounter of 02/28/18    Duplex Carotid Ultrasound CAR    Interpretation Summary  · Proximal right internal carotid artery mild stenosis.  · Proximal left internal  "carotid artery mild stenosis.      Pertinent Labs     Results from last 7 days   Lab Units 03/12/25  0459 03/11/25  0507   WBC 10*3/mm3 4.36 8.73   HEMOGLOBIN g/dL 13.6 14.5   PLATELETS 10*3/mm3 137* 189     Results from last 7 days   Lab Units 03/12/25  0459 03/11/25  0507   SODIUM mmol/L 140 141   POTASSIUM mmol/L 3.5 3.5   CHLORIDE mmol/L 104 103   CO2 mmol/L 25.0 23.5   BUN mg/dL 16 18   CREATININE mg/dL 0.81 1.06   GLUCOSE mg/dL 83 156*   EGFR mL/min/1.73 90.8 72.3     Results from last 7 days   Lab Units 03/11/25  0507   ALBUMIN g/dL 3.9   BILIRUBIN mg/dL 0.9   ALK PHOS U/L 90   AST (SGOT) U/L 25   ALT (SGPT) U/L 22     Results from last 7 days   Lab Units 03/12/25  0459 03/11/25  0507   CALCIUM mg/dL 9.0 9.0   ALBUMIN g/dL  --  3.9     Results from last 7 days   Lab Units 03/11/25  0507   LIPASE U/L 31             Invalid input(s): \"LDLCALC\"  Results from last 7 days   Lab Units 03/11/25  0523   BLOODCX  No growth at 2 days  No growth at 2 days     Results from last 7 days   Lab Units 03/11/25  0510   COVID19  Not Detected       Test Results Pending at Discharge     Pending Labs       Order Current Status    Blood Culture - Blood, Arm, Left Preliminary result    Blood Culture - Blood, Arm, Right Preliminary result            Discharge Details        Discharge Medications        Continue These Medications        Instructions Start Date   acetaminophen 500 MG tablet  Commonly known as: TYLENOL   500 mg, Oral, Every 6 Hours PRN      aspirin 81 MG tablet   81 mg, Daily      atorvastatin 80 MG tablet  Commonly known as: LIPITOR   80 mg, Oral, Daily      cholecalciferol 10 MCG (400 UNIT) tablet  Commonly known as: VITAMIN D3   400 Units, Daily      clindamycin 1 % gel   Every other day      diphenhydrAMINE-acetaminophen  MG tablet per tablet  Commonly known as: TYLENOL PM   1 tablet, Nightly PRN      FISH OIL PO   1 capsule, Daily      isosorbide mononitrate 30 MG 24 hr tablet  Commonly known as: IMDUR   30 " mg, Oral, Daily      lisinopril 20 MG tablet  Commonly known as: PRINIVIL,ZESTRIL   20 mg, Oral, Daily      meclizine 12.5 MG tablet  Commonly known as: ANTIVERT   12.5 mg, Oral, 3 Times Daily PRN      metoprolol succinate XL 50 MG 24 hr tablet  Commonly known as: TOPROL-XL   Take 1 tablet by mouth daily      nexIUM 24HR 20 MG tablet delayed-release  Generic drug: Esomeprazole Magnesium   No dose, route, or frequency recorded.      nitroglycerin 0.4 MG SL tablet  Commonly known as: NITROSTAT   1 tablet, Sublingual, Every 5 Minutes PRN      ondansetron ODT 8 MG disintegrating tablet  Commonly known as: ZOFRAN-ODT   8 mg, Translingual, Every 12 Hours PRN      sertraline 100 MG tablet  Commonly known as: ZOLOFT   50 mg, Oral, Daily      ZyrTEC Allergy 10 MG capsule  Generic drug: Cetirizine HCl   Take  by mouth.               No Known Allergies    Discharge Disposition:  Home or Self Care      Discharge Diet:  Diet Order   Procedures    Diet: Regular/House; Fluid Consistency: Thin (IDDSI 0)       Discharge Activity:   Activity Instructions       Activity as Tolerated              CODE STATUS:    Code Status and Medical Interventions: CPR (Attempt to Resuscitate); Full   Ordered at: 03/11/25 0720     Code Status (Patient has no pulse and is not breathing):    CPR (Attempt to Resuscitate)     Medical Interventions (Patient has pulse or is breathing):    Full     Level Of Support Discussed With:    Patient       Future Appointments   Date Time Provider Department Center   7/21/2025  8:30 AM Ivelisse Casarez APRN [facility]      Follow-up Information       Ivelisse Casarez APRN. Schedule an appointment as soon as possible for a visit.    Specialty: Internal Medicine  Contact information:  [address] [facility]  [address] [place] [address] [phone]                             Time Spent on Discharge:  Greater than 30 minutes spent on discharge management including final examination, discussion of hospital  stay and patient education, preparation of records, medication reconciliation, follow up planning      Rory Morse MD  [facility]  03/13/25  08:58 EDT

## 2025-03-13 NOTE — PLAN OF CARE
Problem: Adult Inpatient Plan of Care  Goal: Plan of Care Review  Outcome: Progressing  Flowsheets (Taken 3/13/2025 0417)  Progress: improving  Outcome Evaluation: Pt A&OX4, VSS, Denies pain. Tolerated diet. No shift events.  Plan of Care Reviewed With: patient  Goal: Absence of Hospital-Acquired Illness or Injury  Outcome: Progressing  Intervention: Identify and Manage Fall Risk  Recent Flowsheet Documentation  Taken 3/13/2025 0416 by Jenny Tam, JOSE  Safety Promotion/Fall Prevention:   assistive device/personal items within reach   clutter free environment maintained   fall prevention program maintained   nonskid shoes/slippers when out of bed   room organization consistent   safety round/check completed  Taken 3/13/2025 0228 by Jenny Tam, JOSE  Safety Promotion/Fall Prevention:   assistive device/personal items within reach   clutter free environment maintained   fall prevention program maintained   nonskid shoes/slippers when out of bed   room organization consistent   safety round/check completed  Taken 3/13/2025 0029 by Jenny Tam, JOSE  Safety Promotion/Fall Prevention:   assistive device/personal items within reach   clutter free environment maintained   fall prevention program maintained   nonskid shoes/slippers when out of bed   room organization consistent   safety round/check completed  Taken 3/12/2025 2234 by Jenny Tam, RN  Safety Promotion/Fall Prevention:   assistive device/personal items within reach   clutter free environment maintained   fall prevention program maintained   nonskid shoes/slippers when out of bed   room organization consistent   safety round/check completed  Taken 3/12/2025 2047 by Jenny Tam, RN  Safety Promotion/Fall Prevention:   activity supervised   assistive device/personal items within reach   clutter free environment maintained   fall prevention program maintained   lighting adjusted   nonskid shoes/slippers when out of bed   room organization consistent    safety round/check completed  Intervention: Prevent Skin Injury  Recent Flowsheet Documentation  Taken 3/13/2025 0416 by Jenny Tam RN  Body Position: position changed independently  Taken 3/13/2025 0228 by Jenny Tam RN  Body Position: position changed independently  Taken 3/13/2025 0029 by Jenny Tam RN  Body Position: position changed independently  Taken 3/12/2025 2234 by Jenny Tam RN  Body Position: position changed independently  Taken 3/12/2025 2047 by Jenny Tam RN  Body Position: position changed independently  Intervention: Prevent and Manage VTE (Venous Thromboembolism) Risk  Recent Flowsheet Documentation  Taken 3/12/2025 2047 by Jenny Tam RN  VTE Prevention/Management: patient refused intervention  Intervention: Prevent Infection  Recent Flowsheet Documentation  Taken 3/12/2025 2047 by Jenny Tam RN  Infection Prevention:   environmental surveillance performed   hand hygiene promoted   personal protective equipment utilized   rest/sleep promoted   single patient room provided  Goal: Optimal Comfort and Wellbeing  Outcome: Progressing  Intervention: Provide Person-Centered Care  Recent Flowsheet Documentation  Taken 3/12/2025 2047 by Jenny Tam RN  Trust Relationship/Rapport:   care explained   questions answered   questions encouraged  Goal: Readiness for Transition of Care  Outcome: Progressing     Problem: Comorbidity Management  Goal: Blood Pressure in Desired Range  Outcome: Progressing  Intervention: Maintain Blood Pressure Management  Recent Flowsheet Documentation  Taken 3/12/2025 2047 by Jenny Tam RN  Medication Review/Management: medications reviewed   Goal Outcome Evaluation:  Plan of Care Reviewed With: patient        Progress: improving  Outcome Evaluation: Pt A&OX4, VSS, Denies pain. Tolerated diet. No shift events.

## 2025-03-13 NOTE — OUTREACH NOTE
Prep Survey      Flowsheet Row Responses   [facility] facility patient discharged from? [place]   Is LACE score < 7 ? Yes   Eligibility [facility]   Date of Admission 03/11/25   Date of Discharge 03/13/25   Discharge Disposition Home or Self Care   Discharge diagnosis Norovirus   Does the patient have one of the following disease processes/diagnoses(primary or secondary)? Other   Does the patient have Home health ordered? No   Is there a DME ordered? No   Prep survey completed? Yes            GRACIA DYER - Registered Nurse

## 2025-03-14 ENCOUNTER — TRANSITIONAL CARE MANAGEMENT TELEPHONE ENCOUNTER (OUTPATIENT)
Dept: CALL CENTER | Facility: HOSPITAL | Age: 75
End: 2025-03-14
Payer: MEDICARE

## 2025-03-14 NOTE — OUTREACH NOTE
Call Center TCM Note      Flowsheet Row Responses   [facility] patient discharged from? [place]   Does the patient have one of the following disease processes/diagnoses(primary or secondary)? Other   TCM attempt successful? No   Unsuccessful attempts Attempt 1            Daniella Moyer RN    3/14/2025, 11:40 EDT

## 2025-03-14 NOTE — OUTREACH NOTE
Call Center TCM Note      Flowsheet Row Responses   [facility] patient discharged from? [place]   Does the patient have one of the following disease processes/diagnoses(primary or secondary)? Other   TCM attempt successful? Yes   Call start time 1548   Call end time 1550   Discharge diagnosis Norovirus   Meds reviewed with patient/caregiver? Yes   Is the patient having any side effects they believe may be caused by any medication additions or changes? No   Does the patient have all medications ordered at discharge? Yes   Is the patient taking all medications as directed (includes completed medication regime)? Yes   Comments HOSP DC FU appt 3/18/25 945 am   Does the patient have an appointment with their PCP within 7-14 days of discharge? Yes   Has home health visited the patient within 72 hours of discharge? N/A   Psychosocial issues? No   Did the patient receive a copy of their discharge instructions? Yes   Nursing interventions Reviewed instructions with patient   What is the patient's perception of their health status since discharge? Improving   Is the patient/caregiver able to teach back signs and symptoms related to disease process for when to call PCP? Yes   Is the patient/caregiver able to teach back signs and symptoms related to disease process for when to call 911? Yes   Is the patient/caregiver able to teach back the hierarchy of who to call/visit for symptoms/problems? PCP, Specialist, Home health nurse, Urgent Care, ED, 911 Yes   TCM call completed? Yes   Wrap up additional comments Pt reports he is doing much better,   Call end time 1550            Daniella Moyer RN    3/14/2025, 15:50 EDT

## 2025-03-16 LAB
BACTERIA SPEC AEROBE CULT: NORMAL
BACTERIA SPEC AEROBE CULT: NORMAL

## 2025-03-18 ENCOUNTER — OFFICE VISIT (OUTPATIENT)
Dept: INTERNAL MEDICINE | Facility: CLINIC | Age: 75
End: 2025-03-18
Payer: MEDICARE

## 2025-03-18 VITALS
OXYGEN SATURATION: 99 % | WEIGHT: 224.2 LBS | TEMPERATURE: 97.9 F | HEIGHT: 73 IN | DIASTOLIC BLOOD PRESSURE: 70 MMHG | BODY MASS INDEX: 29.72 KG/M2 | HEART RATE: 75 BPM | SYSTOLIC BLOOD PRESSURE: 122 MMHG

## 2025-03-18 DIAGNOSIS — A08.11 NOROVIRUS: Primary | ICD-10-CM

## 2025-03-18 DIAGNOSIS — J30.9 ALLERGIC RHINITIS, UNSPECIFIED SEASONALITY, UNSPECIFIED TRIGGER: Chronic | ICD-10-CM

## 2025-03-18 DIAGNOSIS — I10 PRIMARY HYPERTENSION: Chronic | ICD-10-CM

## 2025-03-30 NOTE — PROGRESS NOTES
Transitional Care Follow Up Visit  Subjective     Dwayne PATRICA Keita is a 75 y.o. male who presents for a transitional care management visit.    Within 48 business hours after discharge our office contacted him via telephone to coordinate his care and needs.      I reviewed and discussed the details of that call along with the discharge summary, hospital problems, inpatient lab results, inpatient diagnostic studies, and consultation reports with Dwayne.    Current Outpatient Medications:     acetaminophen (TYLENOL) 500 MG tablet, Take 1 tablet by mouth Every 6 (Six) Hours As Needed for Mild Pain., Disp: 30 tablet, Rfl: 0    aspirin 81 MG tablet, Take 1 tablet by mouth Daily., Disp: , Rfl:     atorvastatin (LIPITOR) 80 MG tablet, Take 1 tablet by mouth Daily., Disp: 90 tablet, Rfl: 1    Cetirizine HCl (ZyrTEC Allergy) 10 MG capsule, Take  by mouth., Disp: , Rfl:     cholecalciferol (VITAMIN D3) 10 MCG (400 UNIT) tablet, Take 1 tablet by mouth Daily., Disp: , Rfl:     clindamycin 1 % gel, Every other day, Disp: , Rfl:     diphenhydrAMINE-acetaminophen (TYLENOL PM)  MG tablet per tablet, Take 1 tablet by mouth At Night As Needed for Sleep., Disp: , Rfl:     isosorbide mononitrate (IMDUR) 30 MG 24 hr tablet, Take 1 tablet by mouth Daily., Disp: 90 tablet, Rfl: 1    lisinopril (PRINIVIL,ZESTRIL) 20 MG tablet, Take 1 tablet by mouth Daily., Disp: 90 tablet, Rfl: 1    meclizine (ANTIVERT) 12.5 MG tablet, Take 1 tablet by mouth 3 (Three) Times a Day As Needed for Dizziness., Disp: 30 tablet, Rfl: 0    metoprolol succinate XL (TOPROL-XL) 50 MG 24 hr tablet, Take 1 tablet by mouth daily, Disp: 90 tablet, Rfl: 1    NEXIUM 24HR 20 MG tablet delayed-release, , Disp: , Rfl:     nitroglycerin (NITROSTAT) 0.4 MG SL tablet, Place 1 tablet under the tongue Every 5 (Five) Minutes As Needed., Disp: , Rfl:     Omega-3 Fatty Acids (FISH OIL PO), Take 1 capsule by mouth Daily., Disp: , Rfl:     ondansetron ODT (ZOFRAN-ODT) 8 MG  disintegrating tablet, Place 1 tablet on the tongue Every 12 (Twelve) Hours As Needed for Nausea or Vomiting., Disp: 10 tablet, Rfl: 0    sertraline (ZOLOFT) 100 MG tablet, Take 0.5 tablets by mouth Daily., Disp: 45 tablet, Rfl: 1    Current outpatient and discharge medications have been reconciled for the patient.  Reviewed by: AMBROSIO Wong          3/13/2025     5:03 PM   Date of TCM Phone Call   [facility]   Date of Admission 3/11/2025   Date of Discharge 3/13/2025   Discharge Disposition Home or Self Care     Risk for Readmission (LACE) No data recorded    History of Present Illness   History of Present Illness      Course During Hospital Stay:     The patient presents for evaluation of norovirus infection.    He presented to the ER 3/11/25 due to generalized malaise for several days. He was initially suspected to have influenza or pneumonia, but subsequent cultures confirmed a norovirus infection. He reports no respiratory symptoms or shortness of breath. His symptoms included vomiting and diarrhea, which were severe enough to warrant an ambulance call at 4:00 AM. He was initially started on Rocephin for suspected pneumonia, but it was stopped after the cultures came back negative. Blood cultures were also negative.     He has been maintaining adequate hydration but acknowledges a decrease in his food intake. He does not experience any postprandial discomfort or abdominal pain. He recalls experiencing severe pain during his ER visit, which has since resolved. He describes experiencing stomach cramps during episodes of vomiting.     He has not yet returned to work due to persistent fatigue. He reports no dizziness or fevers since his hospital discharge. His bowel movements have been regular since his return home. He reports no current nausea or diarrhea.     Supplemental Information  He has a colonoscopy scheduled for June 2025.       The following portions of the patient's history were  reviewed and updated as appropriate: allergies, current medications, past family history, past medical history, past social history, past surgical history, and problem list.    Review of Systems   Constitutional:  Positive for appetite change and fatigue.   HENT:  Positive for congestion and postnasal drip.    Respiratory:  Negative for cough, chest tightness and shortness of breath.    Cardiovascular:  Negative for chest pain, palpitations and leg swelling.   Gastrointestinal:  Negative for abdominal pain.   Musculoskeletal:  Positive for arthralgias.       Objective   Physical Exam  Constitutional:       Appearance: He is well-developed. He is not ill-appearing.   HENT:      Head: Normocephalic.      Right Ear: Hearing, tympanic membrane and external ear normal.      Left Ear: Hearing, tympanic membrane and external ear normal.      Nose: Nose normal. No nasal deformity, mucosal edema or rhinorrhea.      Right Sinus: No maxillary sinus tenderness or frontal sinus tenderness.      Left Sinus: No maxillary sinus tenderness or frontal sinus tenderness.      Mouth/Throat:      Dentition: Normal dentition.   Eyes:      General: Lids are normal.         Right eye: No discharge.         Left eye: No discharge.      Conjunctiva/sclera: Conjunctivae normal.      Right eye: No exudate.     Left eye: No exudate.  Neck:      Thyroid: No thyroid mass or thyromegaly.      Vascular: No carotid bruit.      Trachea: Trachea normal.   Cardiovascular:      Rate and Rhythm: Regular rhythm.      Pulses: Normal pulses.      Heart sounds: Normal heart sounds. No murmur heard.  Pulmonary:      Effort: No respiratory distress.      Breath sounds: Normal breath sounds. No decreased breath sounds, wheezing, rhonchi or rales.   Abdominal:      General: Bowel sounds are normal.      Palpations: Abdomen is soft.      Tenderness: There is no abdominal tenderness.   Musculoskeletal:      Cervical back: Normal range of motion. No edema.    Lymphadenopathy:      Head:      Right side of head: No submental, submandibular, tonsillar, preauricular, posterior auricular or occipital adenopathy.      Left side of head: No submental, submandibular, tonsillar, preauricular, posterior auricular or occipital adenopathy.   Skin:     General: Skin is warm and dry.      Nails: There is no clubbing.   Neurological:      Mental Status: He is alert.   Psychiatric:         Behavior: Behavior is cooperative.       Physical Exam  Oral exam was performed.  Lymph nodes are normal.  Lungs were auscultated.  Abdominal exam was performed.    Vital Signs  Blood pressure is 122/70.    Results  Laboratory Studies  Blood cultures were negative. Potassium was 310. Pancreatic enzymes were normal. Kidney function, sodium, potassium, and blood count were normal.    Imaging  Chest x-ray showed no signs of pneumonia.    Assessment & Plan   Problems Addressed this Visit          Allergies and Adverse Reactions    Allergic rhinitis (Chronic)    He initially c/o increased congestion and drainage which has improved, continue Zyrtec daily. Denies dyspnea.            Cardiac and Vasculature    Hypertension (Chronic)    His blood pressure was elevated during his hospital stay which is excellent in the office today, managed on lisinopril and metoprolol daily.            Infectious Diseases    Norovirus - Primary    He was initially suspected to have influenza or pneumonia, but subsequent cultures confirmed a norovirus infection. He was initially started on Rocephin for suspected pneumonia, but it was stopped after the cultures came back negative. Blood cultures were also negative.           Diagnoses         Codes Comments      Norovirus    -  Primary ICD-10-CM: A08.11  ICD-9-CM: 008.63       Primary hypertension     ICD-10-CM: I10  ICD-9-CM: 401.9       Allergic rhinitis, unspecified seasonality, unspecified trigger     ICD-10-CM: J30.9  ICD-9-CM: 477.9           Assessment & Plan               Patient or patient representative verbalized consent for the use of Ambient Listening during the visit with  AMBROSIO Chance for chart documentation. 3/30/2025  18:18 EDT     No

## 2025-03-30 NOTE — ASSESSMENT & PLAN NOTE
His blood pressure was elevated during his hospital stay which is excellent in the office today, managed on lisinopril and metoprolol daily.

## 2025-03-30 NOTE — ASSESSMENT & PLAN NOTE
He initially c/o increased congestion and drainage which has improved, continue Zyrtec daily. Denies dyspnea.

## 2025-03-30 NOTE — ASSESSMENT & PLAN NOTE
He was initially suspected to have influenza or pneumonia, but subsequent cultures confirmed a norovirus infection. He was initially started on Rocephin for suspected pneumonia, but it was stopped after the cultures came back negative. Blood cultures were also negative.

## 2025-04-08 DIAGNOSIS — I10 PRIMARY HYPERTENSION: Chronic | ICD-10-CM

## 2025-04-09 RX ORDER — LISINOPRIL 20 MG/1
20 TABLET ORAL DAILY
Qty: 90 TABLET | Refills: 1 | Status: SHIPPED | OUTPATIENT
Start: 2025-04-09

## 2025-04-12 ENCOUNTER — APPOINTMENT (OUTPATIENT)
Dept: GENERAL RADIOLOGY | Facility: HOSPITAL | Age: 75
End: 2025-04-12
Payer: MEDICARE

## 2025-04-12 ENCOUNTER — APPOINTMENT (OUTPATIENT)
Dept: CT IMAGING | Facility: HOSPITAL | Age: 75
End: 2025-04-12
Payer: MEDICARE

## 2025-04-12 ENCOUNTER — HOSPITAL ENCOUNTER (EMERGENCY)
Facility: HOSPITAL | Age: 75
Discharge: HOME | End: 2025-04-12
Attending: EMERGENCY MEDICINE | Admitting: STUDENT IN AN ORGANIZED HEALTH CARE EDUCATION/TRAINING PROGRAM
Payer: MEDICARE

## 2025-04-12 VITALS
DIASTOLIC BLOOD PRESSURE: 82 MMHG | RESPIRATION RATE: 18 BRPM | WEIGHT: 223 LBS | HEART RATE: 57 BPM | BODY MASS INDEX: 29.55 KG/M2 | TEMPERATURE: 97.8 F | SYSTOLIC BLOOD PRESSURE: 155 MMHG | OXYGEN SATURATION: 96 % | HEIGHT: 73 IN

## 2025-04-12 DIAGNOSIS — R42 DIZZINESS: ICD-10-CM

## 2025-04-12 DIAGNOSIS — R51.9 ACUTE NONINTRACTABLE HEADACHE, UNSPECIFIED HEADACHE TYPE: Primary | ICD-10-CM

## 2025-04-12 LAB
ALBUMIN SERPL-MCNC: 4.2 G/DL (ref 3.5–5.2)
ALBUMIN/GLOB SERPL: 1.8 G/DL
ALP SERPL-CCNC: 89 U/L (ref 39–117)
ALT SERPL W P-5'-P-CCNC: 27 U/L (ref 1–41)
ANION GAP SERPL CALCULATED.3IONS-SCNC: 9.7 MMOL/L (ref 5–15)
AST SERPL-CCNC: 26 U/L (ref 1–40)
BASOPHILS # BLD AUTO: 0.05 10*3/MM3 (ref 0–0.2)
BASOPHILS NFR BLD AUTO: 0.8 % (ref 0–1.5)
BILIRUB SERPL-MCNC: 0.5 MG/DL (ref 0–1.2)
BUN SERPL-MCNC: 12 MG/DL (ref 8–23)
BUN/CREAT SERPL: 14.8 (ref 7–25)
CALCIUM SPEC-SCNC: 9.3 MG/DL (ref 8.6–10.5)
CHLORIDE SERPL-SCNC: 103 MMOL/L (ref 98–107)
CO2 SERPL-SCNC: 27.3 MMOL/L (ref 22–29)
CREAT SERPL-MCNC: 0.81 MG/DL (ref 0.76–1.27)
DEPRECATED RDW RBC AUTO: 43.5 FL (ref 37–54)
EGFRCR SERPLBLD CKD-EPI 2021: 90.8 ML/MIN/1.73
EOSINOPHIL # BLD AUTO: 0.25 10*3/MM3 (ref 0–0.4)
EOSINOPHIL NFR BLD AUTO: 3.8 % (ref 0.3–6.2)
ERYTHROCYTE [DISTWIDTH] IN BLOOD BY AUTOMATED COUNT: 13.3 % (ref 12.3–15.4)
FLUAV SUBTYP SPEC NAA+PROBE: NOT DETECTED
FLUBV RNA ISLT QL NAA+PROBE: NOT DETECTED
GLOBULIN UR ELPH-MCNC: 2.3 GM/DL
GLUCOSE SERPL-MCNC: 101 MG/DL (ref 65–99)
HCT VFR BLD AUTO: 42.1 % (ref 37.5–51)
HGB BLD-MCNC: 14 G/DL (ref 13–17.7)
IMM GRANULOCYTES # BLD AUTO: 0.02 10*3/MM3 (ref 0–0.05)
IMM GRANULOCYTES NFR BLD AUTO: 0.3 % (ref 0–0.5)
LYMPHOCYTES # BLD AUTO: 1.37 10*3/MM3 (ref 0.7–3.1)
LYMPHOCYTES NFR BLD AUTO: 20.6 % (ref 19.6–45.3)
MCH RBC QN AUTO: 29.4 PG (ref 26.6–33)
MCHC RBC AUTO-ENTMCNC: 33.3 G/DL (ref 31.5–35.7)
MCV RBC AUTO: 88.3 FL (ref 79–97)
MONOCYTES # BLD AUTO: 0.5 10*3/MM3 (ref 0.1–0.9)
MONOCYTES NFR BLD AUTO: 7.5 % (ref 5–12)
NEUTROPHILS NFR BLD AUTO: 4.45 10*3/MM3 (ref 1.7–7)
NEUTROPHILS NFR BLD AUTO: 67 % (ref 42.7–76)
PLATELET # BLD AUTO: 187 10*3/MM3 (ref 140–450)
PMV BLD AUTO: 9.4 FL (ref 6–12)
POTASSIUM SERPL-SCNC: 3.7 MMOL/L (ref 3.5–5.2)
PROT SERPL-MCNC: 6.5 G/DL (ref 6–8.5)
RBC # BLD AUTO: 4.77 10*6/MM3 (ref 4.14–5.8)
SARS-COV-2 RNA RESP QL NAA+PROBE: NOT DETECTED
SODIUM SERPL-SCNC: 140 MMOL/L (ref 136–145)
TROPONIN T SERPL HS-MCNC: 19 NG/L
WBC NRBC COR # BLD AUTO: 6.64 10*3/MM3 (ref 3.4–10.8)

## 2025-04-12 PROCEDURE — 71045 X-RAY EXAM CHEST 1 VIEW: CPT

## 2025-04-12 PROCEDURE — 25010000002 DIPHENHYDRAMINE PER 50 MG: Performed by: EMERGENCY MEDICINE

## 2025-04-12 PROCEDURE — 93005 ELECTROCARDIOGRAM TRACING: CPT | Performed by: EMERGENCY MEDICINE

## 2025-04-12 PROCEDURE — 87636 SARSCOV2 & INF A&B AMP PRB: CPT | Performed by: EMERGENCY MEDICINE

## 2025-04-12 PROCEDURE — 25010000002 METOCLOPRAMIDE PER 10 MG: Performed by: EMERGENCY MEDICINE

## 2025-04-12 PROCEDURE — 96374 THER/PROPH/DIAG INJ IV PUSH: CPT

## 2025-04-12 PROCEDURE — 96361 HYDRATE IV INFUSION ADD-ON: CPT

## 2025-04-12 PROCEDURE — 85025 COMPLETE CBC W/AUTO DIFF WBC: CPT | Performed by: EMERGENCY MEDICINE

## 2025-04-12 PROCEDURE — 36415 COLL VENOUS BLD VENIPUNCTURE: CPT

## 2025-04-12 PROCEDURE — 25810000003 SODIUM CHLORIDE 0.9 % SOLUTION: Performed by: EMERGENCY MEDICINE

## 2025-04-12 PROCEDURE — 99284 EMERGENCY DEPT VISIT MOD MDM: CPT | Performed by: EMERGENCY MEDICINE

## 2025-04-12 PROCEDURE — 99285 EMERGENCY DEPT VISIT HI MDM: CPT

## 2025-04-12 PROCEDURE — 96375 TX/PRO/DX INJ NEW DRUG ADDON: CPT

## 2025-04-12 PROCEDURE — 70496 CT ANGIOGRAPHY HEAD: CPT

## 2025-04-12 PROCEDURE — 80053 COMPREHEN METABOLIC PANEL: CPT | Performed by: EMERGENCY MEDICINE

## 2025-04-12 PROCEDURE — 25510000001 IOPAMIDOL PER 1 ML: Performed by: EMERGENCY MEDICINE

## 2025-04-12 PROCEDURE — 84484 ASSAY OF TROPONIN QUANT: CPT | Performed by: EMERGENCY MEDICINE

## 2025-04-12 RX ORDER — METOCLOPRAMIDE HYDROCHLORIDE 5 MG/ML
10 INJECTION INTRAMUSCULAR; INTRAVENOUS ONCE
Status: COMPLETED | OUTPATIENT
Start: 2025-04-12 | End: 2025-04-12

## 2025-04-12 RX ORDER — MECLIZINE HYDROCHLORIDE 25 MG/1
12.5 TABLET ORAL 3 TIMES DAILY PRN
Qty: 7 TABLET | Refills: 0 | Status: SHIPPED | OUTPATIENT
Start: 2025-04-12 | End: 2025-04-14 | Stop reason: DRUGHIGH

## 2025-04-12 RX ORDER — MECLIZINE HYDROCHLORIDE 25 MG/1
12.5 TABLET ORAL ONCE
Status: COMPLETED | OUTPATIENT
Start: 2025-04-12 | End: 2025-04-12

## 2025-04-12 RX ORDER — DIPHENHYDRAMINE HYDROCHLORIDE 50 MG/ML
25 INJECTION, SOLUTION INTRAMUSCULAR; INTRAVENOUS ONCE
Status: COMPLETED | OUTPATIENT
Start: 2025-04-12 | End: 2025-04-12

## 2025-04-12 RX ORDER — IOPAMIDOL 755 MG/ML
100 INJECTION, SOLUTION INTRAVASCULAR
Status: COMPLETED | OUTPATIENT
Start: 2025-04-12 | End: 2025-04-12

## 2025-04-12 RX ADMIN — METOCLOPRAMIDE 10 MG: 5 INJECTION, SOLUTION INTRAMUSCULAR; INTRAVENOUS at 18:32

## 2025-04-12 RX ADMIN — IOPAMIDOL 85 ML: 755 INJECTION, SOLUTION INTRAVENOUS at 18:35

## 2025-04-12 RX ADMIN — DIPHENHYDRAMINE HYDROCHLORIDE 25 MG: 50 INJECTION, SOLUTION INTRAMUSCULAR; INTRAVENOUS at 18:32

## 2025-04-12 RX ADMIN — SODIUM CHLORIDE 500 ML: 9 INJECTION, SOLUTION INTRAVENOUS at 18:32

## 2025-04-12 RX ADMIN — MECLIZINE HYDROCHLORIDE 12.5 MG: 25 TABLET ORAL at 19:33

## 2025-04-12 NOTE — FSED PROVIDER NOTE
"Subjective   History of Present Illness  76yo male pmh significant htn/hyeprlipidemia/darian/cad/bppv presents ED c/o awakening 0930hrs today with frontal head \"pressure\" associated dizziness/lightheaded with mild intermittent vertigo.  Pt seen urgent care for same et referred ED for further evaluation.  Denies fever/trauma/diplopia/dysarthria/dysphagia/syncope/chest pain/soa/palpitations/motor or sensory loss/vomiting.  ROS (+) chronic tinnitus.    History provided by:  Patient  Dizziness  Associated symptoms: headaches    Associated symptoms: no weakness        Review of Systems   Constitutional: Negative.    Eyes: Negative.    Respiratory: Negative.     Cardiovascular: Negative.    Gastrointestinal: Negative.    Genitourinary: Negative.    Musculoskeletal: Negative.    Neurological:  Positive for dizziness, light-headedness and headaches. Negative for seizures, syncope, facial asymmetry, speech difficulty, weakness and numbness.   All other systems reviewed and are negative.      Past Medical History:   Diagnosis Date    Allergic rhinitis     Anxiety state     Arthritis     Benign neoplasm of skin     Benign positional vertigo     Coronary artery disease     Episodic mood disorder     GERD (gastroesophageal reflux disease)     Hyperlipidemia     Hypertension     MI (myocardial infarction)     2007    Vertigo        No Known Allergies    Past Surgical History:   Procedure Laterality Date    COLONOSCOPY N/A 7/1/2022    Procedure: COLONOSCOPY TO CECUM/TI WITH COLD SNARE POLYPECTOMIES;  Surgeon: Jac Bonilla MD;  Location[facility] ENDOSCOPY;  Service: Gastroenterology;  Laterality: N/A;  pre: PERSONAL HX OF POLYPS  post: NORMAL TI, POLYPS, DIVERTICULOSIS, HEMORRHOIDS    CORONARY ARTERY BYPASS GRAFT      2 vessel with stent    EYE SURGERY Left 11/01/2017       Family History   Problem Relation Age of Onset    Thyroid disease Mother     Malig Hyperthermia Neg Hx        Social History     Socioeconomic History    " Marital status:    Tobacco Use    Smoking status: Former     Current packs/day: 0.00     Average packs/day: 1.5 packs/day for 45.0 years (67.5 ttl pk-yrs)     Types: Cigarettes     Start date:      Quit date:      Years since quittin.2     Passive exposure: Never    Smokeless tobacco: Never   Vaping Use    Vaping status: Never Used   Substance and Sexual Activity    Alcohol use: No    Drug use: Never    Sexual activity: Defer           Objective   Physical Exam  Vitals and nursing note reviewed.   Constitutional:       Appearance: Normal appearance.   HENT:      Head: Normocephalic and atraumatic.      Right Ear: Tympanic membrane, ear canal and external ear normal.      Left Ear: Tympanic membrane, ear canal and external ear normal.      Nose: Nose normal.      Mouth/Throat:      Mouth: Mucous membranes are moist.      Pharynx: Oropharynx is clear.   Eyes:      General: No visual field deficit.     Extraocular Movements: Extraocular movements intact.      Pupils: Pupils are equal, round, and reactive to light.   Cardiovascular:      Rate and Rhythm: Normal rate and regular rhythm.      Pulses: Normal pulses.      Heart sounds: Normal heart sounds. No murmur heard.     No friction rub. No gallop.   Pulmonary:      Effort: Pulmonary effort is normal. No respiratory distress.      Breath sounds: Normal breath sounds. No wheezing, rhonchi or rales.   Abdominal:      General: Abdomen is flat. Bowel sounds are normal. There is no distension.      Palpations: Abdomen is soft.      Tenderness: There is no abdominal tenderness.   Musculoskeletal:         General: No swelling or deformity. Normal range of motion.      Cervical back: Normal range of motion and neck supple. No rigidity.      Right lower leg: No edema.      Left lower leg: No edema.   Lymphadenopathy:      Cervical: No cervical adenopathy.   Skin:     General: Skin is warm and dry.   Neurological:      General: No focal deficit present.       Mental Status: He is alert and oriented to person, place, and time.      GCS: GCS eye subscore is 4. GCS verbal subscore is 5. GCS motor subscore is 6.      Cranial Nerves: Cranial nerves 2-12 are intact. No dysarthria or facial asymmetry.      Sensory: Sensation is intact.      Motor: Motor function is intact.      Coordination: Coordination is intact. Finger-Nose-Finger Test and Heel to Shin Test normal. Rapid alternating movements normal.         ECG 12 Lead      Date/Time: 4/12/2025 6:10 PM    Performed by: Ward Denson MD  Authorized by: Ward Denson MD  Interpreted by ED physician  Rhythm: sinus bradycardia  Rate: bradycardic  BPM: 57  QRS axis: normal  Conduction: 1st degree  ST Segments: ST segments normal  T Waves: T waves normal  Other: no other findings  Clinical impression: abnormal ECG               ED Course  ED Course as of 04/12/25 2020   Sat Apr 12, 2025   1843 Checkout Dr. Barton 1900hrs.  Pending labs/CT imaging/disposition. [SD]   1922 Patient well-appearing on examination.  Patient has vertigo with position changes not at rest.  He has some nystagmus to the right negative test of skew.  CT of the head was unremarkable CBC CMP unremarkable troponin 19. [LQ]   1923 COVID influenza negative glucose 101.  Patient oxygenating 97% on room air.  Attempted Epley maneuver.  Patient has a history of vertigo and this seems consistent with a peripheral vertigo. [LQ]   1931 Patient states he feels well and has no dizziness at rest.  Given 12.5 mg of meclizine.  Follow-up with ENT and return precautions given. [LQ]   2020 Gait intact   [LQ]      ED Course User Index  [LQ] Monalisa Barton MD  [SD] Ward Denson MD      Labs Reviewed   COMPREHENSIVE METABOLIC PANEL - Abnormal; Notable for the following components:       Result Value    Glucose 101 (*)     All other components within normal limits    Narrative:     GFR Categories in Chronic Kidney Disease (CKD)      GFR Category          GFR (mL/min/1.73)     Interpretation  G1                     90 or greater         Normal or high (1)  G2                      60-89                Mild decrease (1)  G3a                   45-59                Mild to moderate decrease  G3b                   30-44                Moderate to severe decrease  G4                    15-29                Severe decrease  G5                    14 or less           Kidney failure          (1)In the absence of evidence of kidney disease, neither GFR category G1 or G2 fulfill the criteria for CKD.    eGFR calculation 2021 CKD-EPI creatinine equation, which does not include race as a factor   COVID-19 AND FLU A/B, NP SWAB IN TRANSPORT MEDIA 1 HR TAT - Normal    Narrative:     Fact sheet for providers: https://www.fda.gov/media/289251/download    Fact sheet for patients: https://www.fda.gov/media/108168/download    Test performed by PCR.   TROPONIN - Normal    Narrative:     High Sensitive Troponin T Reference Range:  <14.0 ng/L- Negative Female for AMI  <22.0 ng/L- Negative Male for AMI  >=14 - Abnormal Female indicating possible myocardial injury.  >=22 - Abnormal Male indicating possible myocardial injury.   Clinicians would have to utilize clinical acumen, EKG, Troponin, and serial changes to determine if it is an Acute Myocardial Infarction or myocardial injury due to an underlying chronic condition.        CBC WITH AUTO DIFFERENTIAL - Normal   CBC AND DIFFERENTIAL    Narrative:     The following orders were created for panel order CBC & Differential.  Procedure                               Abnormality         Status                     ---------                               -----------         ------                     CBC Auto Differential[[id number]]        Normal              Final result                 Please view results for these tests on the individual orders.     CT Angiogram Head  Result Date: 4/12/2025  Narrative: NONCONTRAST CRANIAL CT SCAN, CT ANGIOGRAM HEAD.  HISTORY:  Dizziness and headache  COMPARISON: None..  TECHNIQUE:  Radiation dose reduction techniques were utilized, including automated exposure control and exposure modulation based on body size. Initially, a routine noncontrast cranial CT performed from the skull base through the vertex region. After review, thin-section contrast enhanced CT angiogram images obtained through the brain utilizing angiographic technique. Multi projection 3-D MIP reformatted images were supplemented and reviewed.  FINDINGS CRANIAL CT: No acute hemorrhage or midline shift is demonstrated.. There is diffuse atrophy. There is periventricular deep white matter microangiopathic change. Postcontrast imaging does not show any evidence of venous occlusion or abnormal enhancement. Bone window images demonstrate mucosal thickening within the ethmoid sinuses, along with extensive opacification of the maxillary sinuses. There is also a mucous retention cyst within the right sphenoid sinus.     CRANIAL CTA ANGIOGRAM:  FINDINGS: The vertebral arteries are patent bilaterally. The right is dominant. Basilar artery is normal. The posterior communicating arteries appear to be patent bilaterally. The intracranial internal carotid arteries are patent. There is an anterior communicating artery. Right A1 is mildly hypoplastic. The middle cerebral and anterior cerebral arteries are patent bilaterally.           Impression: 1. No acute intracranial findings. 2. Sinus inflammatory change. 3. No intracranial stenosis or occlusion.   Radiation dose reduction techniques were utilized, including automated exposure control and exposure modulation based on body size.   This report was finalized on 4/12/2025 8:11 PM by Dr. sAia Cuevas M.D on Workstation: [id number]      XR Chest 1 View  Result Date: 4/12/2025  Narrative: Emergency portable view of the chest on 4/12/2025  CLINICAL HISTORY: This is a 75-year-old male patient with acute dizziness and headache today   This is correlated to the prior chest x-ray on 3/11/2025.  FINDINGS: There are multiple sternal wires from previous cardiac surgery. The cardiomediastinal silhouette and the pulmonary vasculature are within normal limits. The lungs are clear. The costophrenic angles are sharp.      Impression: 1. No active disease is seen in the chest with no significant change when compared to this patient's prior chest x-ray on 3/11/2025. The etiology of this patient's acute dizziness and headache is not established on this exam.  This report was finalized on 4/12/2025 6:46 PM by Dr. Aydin Melton M.D on Workstation: [id number]                                           Medical Decision Making  Problems Addressed:  Acute nonintractable headache, unspecified headache type: complicated acute illness or injury  Dizziness: complicated acute illness or injury    Amount and/or Complexity of Data Reviewed  Labs: ordered.  Radiology: ordered.  ECG/medicine tests: ordered and independent interpretation performed.    Risk  Prescription drug management.    NIH Stroke Scale/Score (NIHSS) - MDCalc  Calculated on Apr 12 2025 5:54 PM  0 points -> NIH Stroke Scale    Final diagnoses:   Acute nonintractable headache, unspecified headache type   Dizziness       ED Disposition  ED Disposition       ED Disposition   Discharge    Condition   Stable    Comment   --               Patrick Zapien MD  [address]  [place] [id number] [phone]               Medication List        Changed      meclizine 25 MG tablet  Commonly known as: ANTIVERT  Take 0.5 tablets by mouth 3 (Three) Times a Day As Needed for Dizziness for up to 5 days.  What changed: medication strength               Where to Get Your Medications        These medications were sent to [facility]/pharmacy #[id number] - [place], [place] - [address] AT [facility] - [phone]  - [phone]   [address] [address]      Phone: [phone]   meclizine 25 MG  tablet

## 2025-04-12 NOTE — DISCHARGE INSTRUCTIONS
Follow-up with ENT.  If you have vomiting, confusion, persistent dizziness, difficulty walking, or any other concerning symptoms, return to the emergency department.

## 2025-04-13 LAB
QT INTERVAL: 421 MS
QTC INTERVAL: 410 MS

## 2025-04-14 ENCOUNTER — OFFICE VISIT (OUTPATIENT)
Dept: INTERNAL MEDICINE | Facility: CLINIC | Age: 75
End: 2025-04-14
Payer: MEDICARE

## 2025-04-14 VITALS
HEIGHT: 73 IN | SYSTOLIC BLOOD PRESSURE: 122 MMHG | RESPIRATION RATE: 18 BRPM | BODY MASS INDEX: 30 KG/M2 | TEMPERATURE: 97.8 F | DIASTOLIC BLOOD PRESSURE: 78 MMHG | HEART RATE: 56 BPM | WEIGHT: 226.4 LBS | OXYGEN SATURATION: 96 %

## 2025-04-14 DIAGNOSIS — J30.9 ALLERGIC RHINITIS, UNSPECIFIED SEASONALITY, UNSPECIFIED TRIGGER: Chronic | ICD-10-CM

## 2025-04-14 DIAGNOSIS — I10 PRIMARY HYPERTENSION: Chronic | ICD-10-CM

## 2025-04-14 DIAGNOSIS — H81.10 BENIGN PAROXYSMAL POSITIONAL VERTIGO, UNSPECIFIED LATERALITY: Primary | Chronic | ICD-10-CM

## 2025-04-14 RX ORDER — MECLIZINE HCL 12.5 MG 12.5 MG/1
12.5 TABLET ORAL 3 TIMES DAILY PRN
Qty: 15 TABLET | Refills: 0 | Status: SHIPPED | OUTPATIENT
Start: 2025-04-14

## 2025-04-15 ENCOUNTER — PATIENT ROUNDING (BHMG ONLY) (OUTPATIENT)
Dept: URGENT CARE | Facility: CLINIC | Age: 75
End: 2025-04-15
Payer: MEDICARE

## 2025-04-15 NOTE — ED NOTES
Thank you for letting us care for you during your recent visit at [facility]. We would love to hear about your experience with us.         We’re always looking for ways to make our patients’ experiences even better. Do you have any recommendations on ways we may improve?         I appreciate you taking the time to respond. Please be on the lookout for a survey about your recent visit from [facility] via text or email. We would greatly appreciate if you could fill that out and turn it back in. We want your voice to be heard and we value your feedback.         Thank you for choosing [facility] for your healthcare needs.

## 2025-04-29 PROBLEM — R42 VERTIGO: Status: RESOLVED | Noted: 2025-04-29 | Resolved: 2025-04-29

## 2025-04-29 PROBLEM — R42 VERTIGO: Status: ACTIVE | Noted: 2025-04-29

## 2025-04-29 NOTE — ASSESSMENT & PLAN NOTE
Has had increased sinus drainage and congestion over the past week which may have contributed to his dizziness.  He will continue Zyrtec with close monitoring of his symptoms.

## 2025-04-29 NOTE — ASSESSMENT & PLAN NOTE
His blood pressure was elevated (190/85, 155/82) in the ER in urgent care.  However, his blood pressure is excellent in the office today.  He will continue lisinopril and metoprolol along with home blood pressure monitoring.

## 2025-04-29 NOTE — PROGRESS NOTES
"Chief Complaint  Dizziness, Headache, and Hypertension    Subjective        [facility] PATRICA Keita presents to [facility] due to c/o dizziness and headache.  He is accompanied by his wife who he has given permission to be present.    History of Present Illness    He woke up with a sensation of feeling lightheaded which was worse with changing positions on April 12.  He also complained of a headache.  He took meclizine without significant improvement and therefore presented to urgent care.  His blood pressure was elevated despite taking lisinopril and metoprolol that day.    No acute abnormalities were noted on exam and symptoms were attributed to elevated blood pressure.  He was sent to the emergency room for further evaluation.  His EKG showed sinus bradycardia but no acute abnormalities.  CT scan of the head was unremarkable.  Test for COVID and influenza was negative.  He does have a personal history of vertigo.    He was discharged home with meclizine which he states has been helpful.  He feels as though his symptoms have essentially resolved, still feels lightheaded with turning his head.  Denies chest pain or shortness of breath.  Denies diplopia and/or nausea/vomiting. Headache has since resolved.    Objective   Vital Signs:  /78 (BP Location: Left arm)   Pulse 56   Temp 97.8 °F (36.6 °C) (Oral)   Resp 18   Ht 185.4 cm (73\")   Wt 103 kg (226 lb 6.4 oz)   SpO2 96%   BMI 29.87 kg/m²   Estimated body mass index is 29.87 kg/m² as calculated from the following:    Height as of this encounter: 185.4 cm (73\").    Weight as of this encounter: 103 kg (226 lb 6.4 oz).            Physical Exam  Constitutional:       Appearance: He is well-developed. He is not ill-appearing.   HENT:      Head: Normocephalic.      Right Ear: Hearing, tympanic membrane and external ear normal.      Left Ear: Hearing, tympanic membrane and external ear normal.      Nose: Nose normal. No nasal deformity, " mucosal edema or rhinorrhea.      Right Sinus: No maxillary sinus tenderness or frontal sinus tenderness.      Left Sinus: No maxillary sinus tenderness or frontal sinus tenderness.      Mouth/Throat:      Dentition: Normal dentition.      Pharynx: Postnasal drip present.   Eyes:      General: Lids are normal.         Right eye: No discharge.         Left eye: No discharge.      Conjunctiva/sclera: Conjunctivae normal.      Right eye: No exudate.     Left eye: No exudate.  Neck:      Thyroid: No thyroid mass or thyromegaly.      Vascular: No carotid bruit.      Trachea: Trachea normal.   Cardiovascular:      Rate and Rhythm: Regular rhythm.      Pulses: Normal pulses.      Heart sounds: Normal heart sounds. No murmur heard.  Pulmonary:      Effort: No respiratory distress.      Breath sounds: Normal breath sounds. No decreased breath sounds, wheezing, rhonchi or rales.   Abdominal:      General: Bowel sounds are normal.      Palpations: Abdomen is soft.      Tenderness: There is no abdominal tenderness.   Musculoskeletal:      Cervical back: Normal range of motion. No edema.   Lymphadenopathy:      Head:      Right side of head: No submental, submandibular, tonsillar, preauricular, posterior auricular or occipital adenopathy.      Left side of head: No submental, submandibular, tonsillar, preauricular, posterior auricular or occipital adenopathy.   Skin:     General: Skin is warm and dry.      Nails: There is no clubbing.   Neurological:      Mental Status: He is alert.   Psychiatric:         Behavior: Behavior is cooperative.        Result Review :  The following data was reviewed by: AMBROSIO Chance on 04/14/2025:  Common labs          3/11/2025    05:07 3/12/2025    04:59 4/12/2025    18:18   Common Labs   Glucose 156  83  101    BUN 18  16  12    Creatinine 1.06  0.81  0.81    Sodium 141  140  140    Potassium 3.5  3.5  3.7    Chloride 103  104  103    Calcium 9.0  9.0  9.3    Albumin 3.9   4.2    Total  Bilirubin 0.9   0.5    Alkaline Phosphatase 90   89    AST (SGOT) 25   26    ALT (SGPT) 22   27    WBC 8.73  4.36  6.64    Hemoglobin 14.5  13.6  14.0    Hematocrit 43.6  40.3  42.1    Platelets 189  137  187      Data reviewed : Consultant notes UC, ER notes 4/12/25           Assessment and Plan   Diagnoses and all orders for this visit:    1. Benign paroxysmal positional vertigo, unspecified laterality (Primary)  Assessment & Plan:  Symptoms are suggestive of vertigo and did improve with the Epley maneuver and oral meclizine.  He still feels a little lightheaded but is overall better, continue to monitor.    Orders:  -     meclizine (ANTIVERT) 12.5 MG tablet; Take 1 tablet by mouth 3 (Three) Times a Day As Needed for Dizziness.  Dispense: 15 tablet; Refill: 0    2. Allergic rhinitis, unspecified seasonality, unspecified trigger  Assessment & Plan:  Has had increased sinus drainage and congestion over the past week which may have contributed to his dizziness.  He will continue Zyrtec with close monitoring of his symptoms.      3. Primary hypertension  Assessment & Plan:  His blood pressure was elevated (190/85, 155/82) in the ER in urgent care.  However, his blood pressure is excellent in the office today.  He will continue lisinopril and metoprolol along with home blood pressure monitoring.               Follow Up   Return if symptoms worsen or fail to improve, for Next scheduled follow up.  Patient was given instructions and counseling regarding his condition or for health maintenance advice. Please see specific information pulled into the AVS if appropriate.

## 2025-04-29 NOTE — ASSESSMENT & PLAN NOTE
Symptoms are suggestive of vertigo and did improve with the Epley maneuver and oral meclizine.  He still feels a little lightheaded but is overall better, continue to monitor.

## 2025-05-27 DIAGNOSIS — I10 PRIMARY HYPERTENSION: Chronic | ICD-10-CM

## 2025-05-27 RX ORDER — METOPROLOL SUCCINATE 50 MG/1
50 TABLET, EXTENDED RELEASE ORAL DAILY
Qty: 90 TABLET | Refills: 1 | Status: SHIPPED | OUTPATIENT
Start: 2025-05-27

## 2025-06-05 ENCOUNTER — ANESTHESIA (OUTPATIENT)
Dept: GASTROENTEROLOGY | Facility: HOSPITAL | Age: 75
End: 2025-06-05
Payer: MEDICARE

## 2025-06-05 ENCOUNTER — HOSPITAL ENCOUNTER (OUTPATIENT)
Facility: HOSPITAL | Age: 75
Setting detail: HOSPITAL OUTPATIENT SURGERY
Discharge: HOME | End: 2025-06-05
Attending: INTERNAL MEDICINE | Admitting: INTERNAL MEDICINE
Payer: MEDICARE

## 2025-06-05 ENCOUNTER — ANESTHESIA EVENT (OUTPATIENT)
Dept: GASTROENTEROLOGY | Facility: HOSPITAL | Age: 75
End: 2025-06-05
Payer: MEDICARE

## 2025-06-05 VITALS
SYSTOLIC BLOOD PRESSURE: 140 MMHG | WEIGHT: 220.8 LBS | HEIGHT: 73 IN | OXYGEN SATURATION: 96 % | RESPIRATION RATE: 16 BRPM | DIASTOLIC BLOOD PRESSURE: 89 MMHG | HEART RATE: 70 BPM | BODY MASS INDEX: 29.26 KG/M2

## 2025-06-05 DIAGNOSIS — Z12.11 ENCOUNTER FOR SCREENING FOR MALIGNANT NEOPLASM OF COLON: ICD-10-CM

## 2025-06-05 PROCEDURE — 45380 COLONOSCOPY AND BIOPSY: CPT | Performed by: INTERNAL MEDICINE

## 2025-06-05 PROCEDURE — 88305 TISSUE EXAM BY PATHOLOGIST: CPT | Performed by: INTERNAL MEDICINE

## 2025-06-05 PROCEDURE — 25010000002 PROPOFOL 10 MG/ML EMULSION: Performed by: NURSE ANESTHETIST, CERTIFIED REGISTERED

## 2025-06-05 PROCEDURE — 25010000002 LIDOCAINE 2% SOLUTION: Performed by: NURSE ANESTHETIST, CERTIFIED REGISTERED

## 2025-06-05 PROCEDURE — S0260 H&P FOR SURGERY: HCPCS | Performed by: INTERNAL MEDICINE

## 2025-06-05 PROCEDURE — 25810000003 LACTATED RINGERS PER 1000 ML: Performed by: INTERNAL MEDICINE

## 2025-06-05 PROCEDURE — 25010000002 PHENYLEPHRINE 10 MG/ML SOLUTION: Performed by: NURSE ANESTHETIST, CERTIFIED REGISTERED

## 2025-06-05 RX ORDER — PHENYLEPHRINE HYDROCHLORIDE 10 MG/ML
INJECTION INTRAVENOUS AS NEEDED
Status: DISCONTINUED | OUTPATIENT
Start: 2025-06-05 | End: 2025-06-05 | Stop reason: SURG

## 2025-06-05 RX ORDER — PROPOFOL 10 MG/ML
VIAL (ML) INTRAVENOUS AS NEEDED
Status: DISCONTINUED | OUTPATIENT
Start: 2025-06-05 | End: 2025-06-05 | Stop reason: SURG

## 2025-06-05 RX ORDER — LIDOCAINE HYDROCHLORIDE 20 MG/ML
INJECTION, SOLUTION INFILTRATION; PERINEURAL AS NEEDED
Status: DISCONTINUED | OUTPATIENT
Start: 2025-06-05 | End: 2025-06-05 | Stop reason: SURG

## 2025-06-05 RX ORDER — SODIUM CHLORIDE, SODIUM LACTATE, POTASSIUM CHLORIDE, CALCIUM CHLORIDE 600; 310; 30; 20 MG/100ML; MG/100ML; MG/100ML; MG/100ML
1000 INJECTION, SOLUTION INTRAVENOUS CONTINUOUS
Status: DISCONTINUED | OUTPATIENT
Start: 2025-06-05 | End: 2025-06-05 | Stop reason: HOSPADM

## 2025-06-05 RX ADMIN — SODIUM CHLORIDE, POTASSIUM CHLORIDE, SODIUM LACTATE AND CALCIUM CHLORIDE 1000 ML: 600; 310; 30; 20 INJECTION, SOLUTION INTRAVENOUS at 08:51

## 2025-06-05 RX ADMIN — PROPOFOL 100 MG: 10 INJECTION, EMULSION INTRAVENOUS at 09:15

## 2025-06-05 RX ADMIN — PHENYLEPHRINE HYDROCHLORIDE 100 MCG: 10 INJECTION INTRAVENOUS at 09:25

## 2025-06-05 RX ADMIN — LIDOCAINE HYDROCHLORIDE 50 MG: 20 INJECTION, SOLUTION INFILTRATION; PERINEURAL at 09:15

## 2025-06-05 RX ADMIN — PROPOFOL 180 MCG/KG/MIN: 10 INJECTION, EMULSION INTRAVENOUS at 09:16

## 2025-06-05 NOTE — ANESTHESIA POSTPROCEDURE EVALUATION
"Patient: Dwayne Keita    Procedure Summary       Date: 06/05/25 Room / Location: [facility] ENDOSCOPY 8 /  [facility]    Anesthesia Start: 0913 Anesthesia Stop: 0938    Procedure: COLONOSCOPY into cecum with cold bx polypectomy Diagnosis:       Encounter for screening for malignant neoplasm of colon      (Encounter for screening for malignant neoplasm of colon [Z12.11])    Surgeons: Jac Bonilla MD Provider: Octavio Ochoa DO    Anesthesia Type: MAC ASA Status: 3            Anesthesia Type: MAC    Vitals  Vitals Value Taken Time   /76 06/05/25 09:47   Temp     Pulse 80 06/05/25 10:00   Resp 15 06/05/25 09:47   SpO2 95 % 06/05/25 10:00   Vitals shown include unfiled device data.        Post Anesthesia Care and Evaluation    Patient location during evaluation: bedside  Patient participation: complete - patient participated  Level of consciousness: awake and alert  Pain management: adequate    Airway patency: patent  Anesthetic complications: No anesthetic complications  PONV Status: controlled  Cardiovascular status: acceptable and hemodynamically stable  Respiratory status: acceptable, spontaneous ventilation and nonlabored ventilation  Hydration status: acceptable    Comments: /76 (BP Location: Left arm, Patient Position: Lying)   Pulse 72   Resp 15   Ht 185.4 cm (73\")   Wt 100 kg (220 lb 12.8 oz)   SpO2 97%   BMI 29.13 kg/m²       "

## 2025-06-05 NOTE — ANESTHESIA PREPROCEDURE EVALUATION
Anesthesia Evaluation     Patient summary reviewed   no history of anesthetic complications:   NPO Solid Status: > 8 hours  NPO Liquid Status: > 2 hours           Airway   Mallampati: III  TM distance: >3 FB  Neck ROM: full  No difficulty expected  Dental    (+) edentulous    Pulmonary     breath sounds clear to auscultation  (+) a smoker Former, COPD,sleep apnea  (-) shortness of breath, recent URI  Cardiovascular   Exercise tolerance: good (4-7 METS)    Patient on routine beta blocker and Beta blocker given within 24 hours of surgery  Rhythm: regular  Rate: normal    (+) hypertension, past MI (2007, 15)  >12 months, CAD, CABG (2007) >6 Months, cardiac stents (2007) Bare metal stent more than 12 months ago , hyperlipidemia  (-) dysrhythmias, angina    ROS comment:   Cath 12/2020: Nonobstructive CAD, widely patent stent in RCA, 2 of 2 bypass grafts patent, normal LVSF, normal LVEDP, no significant MR or AS     7/2022: echo and nuclear stress test performed without acute abnl       Neuro/Psych  (+) dizziness/light headedness, psychiatric history Anxiety  (-) seizures, CVA  GI/Hepatic/Renal/Endo    (+) GERD  (-)  obesity    Musculoskeletal     (-) neck stiffness  Abdominal    Substance History      OB/GYN          Other   arthritis,                   Anesthesia Plan    ASA 3     MAC     (MAC anesthesia discussed with patient and/or patient representative. Risks (including but not limited to intra-op awareness), benefits, and alternatives were discussed. Understanding was voiced with an agreement to proceed with a MAC technique and General as a backup option. )    Anesthetic plan, risks, benefits, and alternatives have been provided, discussed and informed consent has been obtained with: patient.      CODE STATUS:

## 2025-06-05 NOTE — H&P
[facility]  Pre Procedure History & Physical    Chief Complaint:   Time for my colonoscopy    Subjective     HPI:   75 y.o. male presenting to endoscopy unit today for surveillance colonoscopy.    Past Medical History:   Past Medical History:   Diagnosis Date    Allergic rhinitis     Anxiety state     Arthritis     Benign neoplasm of skin     Benign positional vertigo     Coronary artery disease     Episodic mood disorder     GERD (gastroesophageal reflux disease)     Hyperlipidemia     Hypertension     MI (myocardial infarction)     2007    Vertigo        Family History:  Family History   Problem Relation Age of Onset    Thyroid disease Mother     Malig Hyperthermia Neg Hx        Social History:   reports that he quit smoking about 17 years ago. His smoking use included cigarettes. He started smoking about 62 years ago. He has a 67.5 pack-year smoking history. He has never been exposed to tobacco smoke. He has never used smokeless tobacco. He reports that he does not drink alcohol and does not use drugs.    Medications:   Medications Prior to Admission   Medication Sig Dispense Refill Last Dose/Taking    acetaminophen (TYLENOL) 500 MG tablet Take 1 tablet by mouth Every 6 (Six) Hours As Needed for Mild Pain. 30 tablet 0 6/4/2025 Morning    aspirin 81 MG tablet Take 1 tablet by mouth Daily.   6/4/2025    atorvastatin (LIPITOR) 80 MG tablet Take 1 tablet by mouth Daily. 90 tablet 1 6/4/2025    Cetirizine HCl (ZyrTEC Allergy) 10 MG capsule Take 1 capsule by mouth Daily.   6/4/2025    cholecalciferol (VITAMIN D3) 10 MCG (400 UNIT) tablet Take 1 tablet by mouth Daily.   6/4/2025    clindamycin 1 % gel Apply 1 Application topically to the appropriate area as directed 2 (Two) Times a Day. Every other day   Taking    diphenhydrAMINE-acetaminophen (TYLENOL PM)  MG tablet per tablet Take 1 tablet by mouth At Night As Needed for Sleep.   Past Week    isosorbide mononitrate (IMDUR) 30 MG 24 hr  "tablet Take 1 tablet by mouth Daily. 90 tablet 1 6/4/2025    lisinopril (PRINIVIL,ZESTRIL) 20 MG tablet TAKE 1 TABLET BY MOUTH EVERY DAY 90 tablet 1 6/5/2025 Morning    metoprolol succinate XL (TOPROL-XL) 50 MG 24 hr tablet TAKE ONE TABLET BY MOUTH EVERY DAY 90 tablet 1 6/5/2025 Morning    NEXIUM 24HR 20 MG tablet delayed-release Take 1 tablet by mouth Daily.   6/4/2025    nitroglycerin (NITROSTAT) 0.4 MG SL tablet Place 1 tablet under the tongue Every 5 (Five) Minutes As Needed.   Taking As Needed    Omega-3 Fatty Acids (FISH OIL PO) Take 1 capsule by mouth Daily.   6/4/2025    sertraline (ZOLOFT) 100 MG tablet Take 0.5 tablets by mouth Daily. 45 tablet 1 6/4/2025    meclizine (ANTIVERT) 12.5 MG tablet Take 1 tablet by mouth 3 (Three) Times a Day As Needed for Dizziness. 15 tablet 0 More than a month       Allergies:  Patient has no known allergies.    Objective     Blood pressure 163/94, pulse 66, resp. rate 18, height 185.4 cm (73\"), weight 100 kg (220 lb 12.8 oz), SpO2 95%.  Physical Exam:   General: patient awake, alert and cooperative    Assessment & Plan     Diagnosis:  Personal hx of colon polyps    Anticipated Surgical Procedure:  Colonoscopy    The risks, benefits, and alternatives of this procedure have been discussed with the patient or the responsible party- the patient understands and agrees to proceed.                                                                 "

## 2025-06-05 NOTE — DISCHARGE INSTRUCTIONS
For the next 24 hours patient needs to be with a responsible adult.    For THE REST OF TODAY DO NOT drive, operate machinery, appliances, drink alcohol, make important decisions or sign legal documents.    Start with a light or bland diet if you are feeling sick to your stomach otherwise advance to regular diet as tolerated.    Follow recommendations on procedure report if provided by your doctor.    Call Dr Bonilla  for problems [phone]    Problems may include but not limited to: large amounts of bleeding, trouble breathing, repeated vomiting, severe unrelieved pain, fever or chills.      If biopsies or polyps were taken, MD will call you with the results in about 7 days. If you don't hear from the MD in 2 weeks, call the number above.

## 2025-06-06 LAB
CYTO UR: NORMAL
LAB AP CASE REPORT: NORMAL
LAB AP CLINICAL INFORMATION: NORMAL
PATH REPORT.FINAL DX SPEC: NORMAL
PATH REPORT.GROSS SPEC: NORMAL

## 2025-06-22 DIAGNOSIS — H81.10 BENIGN PAROXYSMAL POSITIONAL VERTIGO, UNSPECIFIED LATERALITY: Chronic | ICD-10-CM

## 2025-06-23 ENCOUNTER — OFFICE VISIT (OUTPATIENT)
Dept: INTERNAL MEDICINE | Facility: CLINIC | Age: 75
End: 2025-06-23
Payer: MEDICARE

## 2025-06-23 VITALS
BODY MASS INDEX: 30.22 KG/M2 | HEIGHT: 73 IN | TEMPERATURE: 96.9 F | OXYGEN SATURATION: 96 % | WEIGHT: 228 LBS | DIASTOLIC BLOOD PRESSURE: 76 MMHG | HEART RATE: 59 BPM | SYSTOLIC BLOOD PRESSURE: 142 MMHG

## 2025-06-23 DIAGNOSIS — R42 DIZZINESS: Primary | ICD-10-CM

## 2025-06-23 DIAGNOSIS — J30.9 ALLERGIC RHINITIS, UNSPECIFIED SEASONALITY, UNSPECIFIED TRIGGER: Chronic | ICD-10-CM

## 2025-06-23 DIAGNOSIS — H61.22 IMPACTED CERUMEN OF LEFT EAR: ICD-10-CM

## 2025-06-23 DIAGNOSIS — H93.13 TINNITUS OF BOTH EARS: ICD-10-CM

## 2025-06-23 RX ORDER — MECLIZINE HCL 12.5 MG 12.5 MG/1
12.5 TABLET ORAL 3 TIMES DAILY PRN
Qty: 15 TABLET | Refills: 0 | Status: SHIPPED | OUTPATIENT
Start: 2025-06-23

## 2025-06-23 NOTE — TELEPHONE ENCOUNTER
Rx Refill Note  Requested Prescriptions     Pending Prescriptions Disp Refills    meclizine (ANTIVERT) 12.5 MG tablet 15 tablet 0     Sig: Take 1 tablet by mouth 3 (Three) Times a Day As Needed for Dizziness.      Last office visit with prescribing clinician: 4/14/2025   Last telemedicine visit with prescribing clinician: Visit date not found   Next office visit with prescribing clinician: 6/23/2025                         Would you like a call back once the refill request has been completed: [] Yes [] No    If the office needs to give you a call back, can they leave a voicemail: [] Yes [] No    Char Daley  06/23/25, 08:25 EDT

## 2025-07-04 PROBLEM — H93.13 TINNITUS OF BOTH EARS: Chronic | Status: ACTIVE | Noted: 2025-07-04

## 2025-07-04 PROBLEM — Z12.11 ENCOUNTER FOR SCREENING FOR MALIGNANT NEOPLASM OF COLON: Status: RESOLVED | Noted: 2025-03-06 | Resolved: 2025-07-04

## 2025-07-04 PROBLEM — H93.13 TINNITUS OF BOTH EARS: Status: ACTIVE | Noted: 2025-07-04

## 2025-07-04 PROBLEM — H61.22 IMPACTED CERUMEN OF LEFT EAR: Status: ACTIVE | Noted: 2025-07-04

## 2025-07-04 NOTE — ASSESSMENT & PLAN NOTE
Sx suggestive of inner ear pressure, BP is slightly elevated which he will continue to monitor. Continue meclizine and monitor symptoms. RTC if sx persist or worsen.

## 2025-07-04 NOTE — PROGRESS NOTES
"Chief Complaint  Dizziness (6/21- this morning ) and Ear Fullness (Left ear feels stopped up )  Subjective        Dwayne Keita presents to [facility]  History of Present Illness  History of Present Illness  The patient presents for evaluation of dizziness.    Symptoms began Saturday morning with imbalance upon rising, headaches, and a sense of the left ear being \"blocked.\" Dizziness worsens with head movement to the left or looking down. No associated nausea. Condition has improved but discomfort persists, especially when navigating doorways. Persistent tinnitus, present for years, has worsened. Managing dizziness with meclizine.    Denies chest pain and/or dyspnea.    MEDICATIONS  Meclizine    Objective   Vital Signs:  /76 (BP Location: Left arm, Patient Position: Sitting, Cuff Size: Adult)   Pulse 59   Temp 96.9 °F (36.1 °C) (Temporal)   Ht 185.4 cm (72.99\")   Wt 103 kg (228 lb)   SpO2 96%   BMI 30.09 kg/m²   Estimated body mass index is 30.09 kg/m² as calculated from the following:    Height as of this encounter: 185.4 cm (72.99\").    Weight as of this encounter: 103 kg (228 lb).            Physical Exam  Constitutional:       Appearance: He is well-developed. He is not ill-appearing.   HENT:      Head: Normocephalic.      Right Ear: Hearing, tympanic membrane and external ear normal.      Left Ear: There is impacted cerumen.      Nose: Nose normal. No nasal deformity, mucosal edema or rhinorrhea.      Right Sinus: No maxillary sinus tenderness or frontal sinus tenderness.      Left Sinus: No maxillary sinus tenderness or frontal sinus tenderness.      Mouth/Throat:      Dentition: Normal dentition.      Pharynx: Posterior oropharyngeal erythema present.   Eyes:      General: Lids are normal.         Right eye: No discharge.         Left eye: No discharge.      Conjunctiva/sclera: Conjunctivae normal.      Right eye: No exudate.     Left eye: No exudate.  Neck:      " Thyroid: No thyroid mass or thyromegaly.      Vascular: No carotid bruit.      Trachea: Trachea normal.   Cardiovascular:      Rate and Rhythm: Regular rhythm.      Pulses: Normal pulses.      Heart sounds: Normal heart sounds. No murmur heard.  Pulmonary:      Effort: No respiratory distress.      Breath sounds: Normal breath sounds. No decreased breath sounds, wheezing, rhonchi or rales.   Abdominal:      General: Bowel sounds are normal.      Palpations: Abdomen is soft.      Tenderness: There is no abdominal tenderness.   Musculoskeletal:      Cervical back: Normal range of motion. No edema.   Lymphadenopathy:      Head:      Right side of head: No submental, submandibular, tonsillar, preauricular, posterior auricular or occipital adenopathy.      Left side of head: No submental, submandibular, tonsillar, preauricular, posterior auricular or occipital adenopathy.   Skin:     General: Skin is warm and dry.      Nails: There is no clubbing.   Neurological:      Mental Status: He is alert.   Psychiatric:         Behavior: Behavior is cooperative.            Result Review :  The following data was reviewed by: AMBROSIO Chance on 06/23/2025:  Common labs          3/11/2025    05:07 3/12/2025    04:59 4/12/2025    18:18   Common Labs   Glucose 156  83  101    BUN 18  16  12    Creatinine 1.06  0.81  0.81    Sodium 141  140  140    Potassium 3.5  3.5  3.7    Chloride 103  104  103    Calcium 9.0  9.0  9.3    Albumin 3.9   4.2    Total Bilirubin 0.9   0.5    Alkaline Phosphatase 90   89    AST (SGOT) 25   26    ALT (SGPT) 22   27    WBC 8.73  4.36  6.64    Hemoglobin 14.5  13.6  14.0    Hematocrit 43.6  40.3  42.1    Platelets 189  137  187           Results        Ear Cerumen Removal    Date/Time: 7/4/2025 11:13 AM    Performed by: Ivelisse Casarez APRN  Authorized by: Ivelisse Casarez APRN  Consent: Verbal consent obtained  Risks and benefits: risks, benefits and alternatives were discussed  Consent given by:  patient  Ceruminolytics applied: Ceruminolytics applied prior to the procedure.  Location details: left ear  Patient tolerance: patient tolerated the procedure well with no immediate complications  Procedure type: instrumentation, irrigation           Assessment and Plan   Diagnoses and all orders for this visit:    1. Dizziness (Primary)  Assessment & Plan:  Sx suggestive of inner ear pressure, BP is slightly elevated which he will continue to monitor. Continue meclizine and monitor symptoms. RTC if sx persist or worsen.      2. Allergic rhinitis, unspecified seasonality, unspecified trigger  Assessment & Plan:  Take Mucinex twice daily to thin mucus and relieve pressure, aiding inner ear drainage.      3. Tinnitus of both ears  Assessment & Plan:  Likely sign of hearing loss, possibly worsened by congestion. Consider ENT if sx worsen.      4. Impacted cerumen of left ear  Assessment & Plan:  Ears irrigate & using curette, excessive cerumen removed. Pt tolerated procedure well and noted improvement in sx.    Orders:  -     Ear Cerumen Removal      Assessment & Plan           Follow Up   No follow-ups on file.  Patient was given instructions and counseling regarding his condition or for health maintenance advice. Please see specific information pulled into the AVS if appropriate.     Patient or patient representative verbalized consent for the use of Ambient Listening during the visit with  AMBROSIO Chance for chart documentation. 7/4/2025  11:16 EDT

## 2025-07-04 NOTE — ASSESSMENT & PLAN NOTE
Ears irrigate & using curette, excessive cerumen removed. Pt tolerated procedure well and noted improvement in sx.

## 2025-07-21 ENCOUNTER — OFFICE VISIT (OUTPATIENT)
Dept: INTERNAL MEDICINE | Facility: CLINIC | Age: 75
End: 2025-07-21
Payer: MEDICARE

## 2025-07-21 VITALS
WEIGHT: 227.6 LBS | OXYGEN SATURATION: 95 % | DIASTOLIC BLOOD PRESSURE: 82 MMHG | SYSTOLIC BLOOD PRESSURE: 124 MMHG | BODY MASS INDEX: 30.17 KG/M2 | HEIGHT: 73 IN | HEART RATE: 64 BPM

## 2025-07-21 DIAGNOSIS — R73.09 ELEVATED GLUCOSE: ICD-10-CM

## 2025-07-21 DIAGNOSIS — E78.00 PURE HYPERCHOLESTEROLEMIA: Chronic | ICD-10-CM

## 2025-07-21 DIAGNOSIS — I10 PRIMARY HYPERTENSION: Primary | Chronic | ICD-10-CM

## 2025-07-21 DIAGNOSIS — R06.00 DYSPNEA, UNSPECIFIED TYPE: ICD-10-CM

## 2025-07-21 DIAGNOSIS — G25.0 ESSENTIAL TREMOR: ICD-10-CM

## 2025-07-21 PROBLEM — H61.22 IMPACTED CERUMEN OF LEFT EAR: Status: RESOLVED | Noted: 2025-07-04 | Resolved: 2025-07-21

## 2025-07-21 LAB
ALBUMIN SERPL-MCNC: 4.3 G/DL (ref 3.5–5.2)
ALBUMIN/GLOB SERPL: 1.9 G/DL
ALP SERPL-CCNC: 96 U/L (ref 39–117)
ALT SERPL-CCNC: 28 U/L (ref 1–41)
AST SERPL-CCNC: 30 U/L (ref 1–40)
BILIRUB SERPL-MCNC: 0.6 MG/DL (ref 0–1.2)
BUN SERPL-MCNC: 11 MG/DL (ref 8–23)
BUN/CREAT SERPL: 12.1 (ref 7–25)
CALCIUM SERPL-MCNC: 9.8 MG/DL (ref 8.6–10.5)
CHLORIDE SERPL-SCNC: 105 MMOL/L (ref 98–107)
CHOLEST SERPL-MCNC: 116 MG/DL (ref 0–200)
CO2 SERPL-SCNC: 27.8 MMOL/L (ref 22–29)
CREAT SERPL-MCNC: 0.91 MG/DL (ref 0.76–1.27)
EGFRCR SERPLBLD CKD-EPI 2021: 86.8 ML/MIN/1.73
GLOBULIN SER CALC-MCNC: 2.3 GM/DL
GLUCOSE SERPL-MCNC: 109 MG/DL (ref 65–99)
HBA1C MFR BLD: 5.7 % (ref 4.8–5.6)
HDLC SERPL-MCNC: 27 MG/DL (ref 40–60)
LDLC SERPL CALC-MCNC: 57 MG/DL (ref 0–100)
POTASSIUM SERPL-SCNC: 4.3 MMOL/L (ref 3.5–5.2)
PROT SERPL-MCNC: 6.6 G/DL (ref 6–8.5)
SODIUM SERPL-SCNC: 143 MMOL/L (ref 136–145)
TRIGL SERPL-MCNC: 195 MG/DL (ref 0–150)
VLDLC SERPL CALC-MCNC: 32 MG/DL (ref 5–40)

## 2025-07-21 PROCEDURE — 3079F DIAST BP 80-89 MM HG: CPT | Performed by: NURSE PRACTITIONER

## 2025-07-21 PROCEDURE — G2211 COMPLEX E/M VISIT ADD ON: HCPCS | Performed by: NURSE PRACTITIONER

## 2025-07-21 PROCEDURE — 1126F AMNT PAIN NOTED NONE PRSNT: CPT | Performed by: NURSE PRACTITIONER

## 2025-07-21 PROCEDURE — 99214 OFFICE O/P EST MOD 30 MIN: CPT | Performed by: NURSE PRACTITIONER

## 2025-07-21 PROCEDURE — 1160F RVW MEDS BY RX/DR IN RCRD: CPT | Performed by: NURSE PRACTITIONER

## 2025-07-21 PROCEDURE — 3074F SYST BP LT 130 MM HG: CPT | Performed by: NURSE PRACTITIONER

## 2025-07-21 PROCEDURE — 1159F MED LIST DOCD IN RCRD: CPT | Performed by: NURSE PRACTITIONER

## 2025-07-21 RX ORDER — METOPROLOL SUCCINATE 25 MG/1
25 TABLET, EXTENDED RELEASE ORAL DAILY
COMMUNITY
Start: 2025-06-30

## 2025-07-21 RX ORDER — LISINOPRIL 30 MG/1
30 TABLET ORAL DAILY
COMMUNITY
Start: 2025-06-30

## 2025-07-21 NOTE — ASSESSMENT & PLAN NOTE
BP has improved since increasing lisinopril dose to 30 mg daily. Continue metoprolol daily along with a low sodium diet.

## 2025-07-21 NOTE — ASSESSMENT & PLAN NOTE
Echocardiogram on 07/07/2025 showed EF 52%, valves normal. Chemical stress test scheduled for 08/04/2025.

## 2025-07-21 NOTE — PROGRESS NOTES
"Chief Complaint  Hyperlipidemia (6 month follow up) and Follow-up (Echo recently done, stress test coming up)  Subjective        Dwayne Keita presents to [facility]  History of Present Illness  History of Present Illness  The patient presents for evaluation of shortness of breath, essential tremor, and vertigo.    He has been under cardiology care for exertional and non-exertional dyspnea. No nocturnal dyspnea or chest tightness. Lisinopril does was recently increased to 30 mg for better glucose control. Echocardiogram on 07/07/2025 showed EF 52%, down from 59%. Stress test scheduled for 08/04/2025 to further evaluate dyspnea.    He reports left thumb cramping and pulling, likely due to garage work. Occasional hand tremor with activity, not at rest.     Vertigo with brief dizziness when lying down, no dizziness on quick standing. Rolling to the right side causes restlessness, no headaches.    Advised no further colonoscopies needed based on age and absence of significant polyps 6/5/25.    Objective   Vital Signs:  /82 (BP Location: Left arm, Patient Position: Sitting, Cuff Size: Adult)   Pulse 64   Ht 185.4 cm (72.99\")   Wt 103 kg (227 lb 9.6 oz)   SpO2 95%   BMI 30.04 kg/m²   Estimated body mass index is 30.04 kg/m² as calculated from the following:    Height as of this encounter: 185.4 cm (72.99\").    Weight as of this encounter: 103 kg (227 lb 9.6 oz).            Physical Exam  Constitutional:       Appearance: He is well-developed. He is not ill-appearing.   HENT:      Head: Normocephalic.      Right Ear: Hearing, tympanic membrane and external ear normal.      Left Ear: Hearing, tympanic membrane and external ear normal.      Nose: Nose normal. No nasal deformity, mucosal edema or rhinorrhea.      Right Sinus: No maxillary sinus tenderness or frontal sinus tenderness.      Left Sinus: No maxillary sinus tenderness or frontal sinus tenderness.      Mouth/Throat:      " Dentition: Normal dentition.   Eyes:      General: Lids are normal.         Right eye: No discharge.         Left eye: No discharge.      Conjunctiva/sclera: Conjunctivae normal.      Right eye: No exudate.     Left eye: No exudate.  Neck:      Thyroid: No thyroid mass or thyromegaly.      Vascular: No carotid bruit.      Trachea: Trachea normal.   Cardiovascular:      Rate and Rhythm: Regular rhythm.      Pulses: Normal pulses.      Heart sounds: Normal heart sounds. No murmur heard.  Pulmonary:      Effort: No respiratory distress.      Breath sounds: Normal breath sounds. No decreased breath sounds, wheezing, rhonchi or rales.   Abdominal:      General: Bowel sounds are normal.      Palpations: Abdomen is soft.      Tenderness: There is no abdominal tenderness.   Musculoskeletal:      Cervical back: Normal range of motion. No edema.   Lymphadenopathy:      Head:      Right side of head: No submental, submandibular, tonsillar, preauricular, posterior auricular or occipital adenopathy.      Left side of head: No submental, submandibular, tonsillar, preauricular, posterior auricular or occipital adenopathy.   Skin:     General: Skin is warm and dry.      Nails: There is no clubbing.   Neurological:      Mental Status: He is alert.   Psychiatric:         Behavior: Behavior is cooperative.            Result Review :  The following data was reviewed by: AMBROSIO Chance on 07/21/2025:  Common labs          3/11/2025    05:07 3/12/2025    04:59 4/12/2025    18:18   Common Labs   Glucose 156  83  101    BUN 18  16  12    Creatinine 1.06  0.81  0.81    Sodium 141  140  140    Potassium 3.5  3.5  3.7    Chloride 103  104  103    Calcium 9.0  9.0  9.3    Albumin 3.9   4.2    Total Bilirubin 0.9   0.5    Alkaline Phosphatase 90   89    AST (SGOT) 25   26    ALT (SGPT) 22   27    WBC 8.73  4.36  6.64    Hemoglobin 14.5  13.6  14.0    Hematocrit 43.6  40.3  42.1    Platelets 189  137  187      Data reviewed : Cardiology  studies echo 7/7/25      Results  Laboratory Studies  Ejection fraction was 52%.           Assessment and Plan   Diagnoses and all orders for this visit:    1. Primary hypertension (Primary)  Assessment & Plan:  BP has improved since increasing lisinopril dose to 30 mg daily. Continue metoprolol daily along with a low sodium diet.    Orders:  -     Comprehensive Metabolic Panel    2. Pure hypercholesterolemia  Assessment & Plan:  He is currently managed on atorvastatin daily which he tolerates well, recheck lipid panel today. LDL goal <55.    Orders:  -     Lipid Panel    3. Elevated glucose  Assessment & Plan:  Glucose has been mildly elevated with previous labs, check A1c today to further evaluate.    Orders:  -     Hemoglobin A1c    4. Essential tremor  Assessment & Plan:  Symptoms suggest essential tremor, benign, worsens with activity, improves with rest. Consider medication if bothersome (declined today).      5. Dyspnea, unspecified type  Assessment & Plan:  Echocardiogram on 07/07/2025 showed EF 52%, valves normal. Chemical stress test scheduled for 08/04/2025.        Assessment & Plan           Follow Up   Return in about 6 months (around 1/21/2026) for wellness.  Patient was given instructions and counseling regarding his condition or for health maintenance advice. Please see specific information pulled into the AVS if appropriate.     Patient or patient representative verbalized consent for the use of Ambient Listening during the visit with  AMBROSIO Chance for chart documentation. 7/21/2025  18:59 EDT

## 2025-07-21 NOTE — ASSESSMENT & PLAN NOTE
Symptoms suggest essential tremor, benign, worsens with activity, improves with rest. Consider medication if bothersome (declined today).

## 2025-07-21 NOTE — ASSESSMENT & PLAN NOTE
He is currently managed on atorvastatin daily which he tolerates well, recheck lipid panel today. LDL goal <55.

## 2025-08-07 RX ORDER — ATORVASTATIN CALCIUM 80 MG/1
80 TABLET, FILM COATED ORAL DAILY
Qty: 90 TABLET | Refills: 1 | Status: SHIPPED | OUTPATIENT
Start: 2025-08-07

## 2025-08-11 RX ORDER — ISOSORBIDE MONONITRATE 30 MG/1
30 TABLET, EXTENDED RELEASE ORAL DAILY
Qty: 90 TABLET | Refills: 1 | Status: SHIPPED | OUTPATIENT
Start: 2025-08-11

## (undated) DEVICE — CANN O2 ETCO2 FITS ALL CONN CO2 SMPL A/ 7IN DISP LF

## (undated) DEVICE — SINGLE-USE BIOPSY FORCEPS: Brand: RADIAL JAW 4

## (undated) DEVICE — TUBING, SUCTION, 1/4" X 10', STRAIGHT: Brand: MEDLINE

## (undated) DEVICE — ADAPT CLN BIOGUARD AIR/H2O DISP

## (undated) DEVICE — SENSR O2 OXIMAX FNGR A/ 18IN NONSTR

## (undated) DEVICE — THE SINGLE USE ETRAP – POLYP TRAP IS USED FOR SUCTION RETRIEVAL OF ENDOSCOPICALLY REMOVED POLYPS.: Brand: ETRAP

## (undated) DEVICE — LN SMPL CO2 SHTRM SD STREAM W/M LUER

## (undated) DEVICE — KT ORCA ORCAPOD DISP STRL

## (undated) DEVICE — SNAR POLYP CAPTIVATOR RND STFF 2.4 240CM 10MM 1P/U